# Patient Record
Sex: MALE | Race: WHITE | NOT HISPANIC OR LATINO | Employment: OTHER | URBAN - METROPOLITAN AREA
[De-identification: names, ages, dates, MRNs, and addresses within clinical notes are randomized per-mention and may not be internally consistent; named-entity substitution may affect disease eponyms.]

---

## 2023-03-28 ENCOUNTER — APPOINTMENT (OUTPATIENT)
Dept: PRIMARY CARE | Facility: CLINIC | Age: 88
End: 2023-03-28
Payer: MEDICARE

## 2023-05-09 ENCOUNTER — OFFICE VISIT (OUTPATIENT)
Dept: PRIMARY CARE | Facility: CLINIC | Age: 88
End: 2023-05-09
Payer: MEDICARE

## 2023-05-09 VITALS
DIASTOLIC BLOOD PRESSURE: 76 MMHG | BODY MASS INDEX: 27.62 KG/M2 | SYSTOLIC BLOOD PRESSURE: 124 MMHG | WEIGHT: 215.2 LBS | HEIGHT: 74 IN | TEMPERATURE: 97.7 F | OXYGEN SATURATION: 98 % | HEART RATE: 58 BPM

## 2023-05-09 DIAGNOSIS — H93.13 BILATERAL TINNITUS: ICD-10-CM

## 2023-05-09 DIAGNOSIS — Z79.4 CONTROLLED TYPE 2 DIABETES MELLITUS WITHOUT COMPLICATION, WITH LONG-TERM CURRENT USE OF INSULIN (MULTI): ICD-10-CM

## 2023-05-09 DIAGNOSIS — R42 VERTIGO: ICD-10-CM

## 2023-05-09 DIAGNOSIS — G47.33 OBSTRUCTIVE SLEEP APNEA SYNDROME: ICD-10-CM

## 2023-05-09 DIAGNOSIS — R35.0 BENIGN PROSTATIC HYPERPLASIA WITH URINARY FREQUENCY: ICD-10-CM

## 2023-05-09 DIAGNOSIS — E11.9 CONTROLLED TYPE 2 DIABETES MELLITUS WITHOUT COMPLICATION, WITH LONG-TERM CURRENT USE OF INSULIN (MULTI): ICD-10-CM

## 2023-05-09 DIAGNOSIS — L57.0 AK (ACTINIC KERATOSIS): ICD-10-CM

## 2023-05-09 DIAGNOSIS — R05.1 ACUTE COUGH: Primary | ICD-10-CM

## 2023-05-09 DIAGNOSIS — N40.1 BENIGN PROSTATIC HYPERPLASIA WITH URINARY FREQUENCY: ICD-10-CM

## 2023-05-09 DIAGNOSIS — J20.9 ACUTE BRONCHITIS, UNSPECIFIED ORGANISM: ICD-10-CM

## 2023-05-09 DIAGNOSIS — E78.2 MIXED HYPERLIPIDEMIA: ICD-10-CM

## 2023-05-09 PROCEDURE — 1036F TOBACCO NON-USER: CPT | Performed by: FAMILY MEDICINE

## 2023-05-09 PROCEDURE — 1159F MED LIST DOCD IN RCRD: CPT | Performed by: FAMILY MEDICINE

## 2023-05-09 PROCEDURE — 99204 OFFICE O/P NEW MOD 45 MIN: CPT | Performed by: FAMILY MEDICINE

## 2023-05-09 PROCEDURE — 1160F RVW MEDS BY RX/DR IN RCRD: CPT | Performed by: FAMILY MEDICINE

## 2023-05-09 RX ORDER — TRAZODONE HYDROCHLORIDE 50 MG/1
1 TABLET ORAL NIGHTLY
Qty: 30 TABLET | Refills: 2 | COMMUNITY
Start: 2023-03-06 | End: 2023-08-15 | Stop reason: SDUPTHER

## 2023-05-09 RX ORDER — TAMSULOSIN HYDROCHLORIDE 0.4 MG/1
0.4 CAPSULE ORAL
COMMUNITY
Start: 2023-03-27 | End: 2023-06-26 | Stop reason: SDUPTHER

## 2023-05-09 RX ORDER — ASPIRIN 81 MG/1
81 TABLET ORAL DAILY
COMMUNITY

## 2023-05-09 RX ORDER — AZITHROMYCIN 250 MG/1
TABLET, FILM COATED ORAL
Qty: 6 TABLET | Refills: 0 | Status: SHIPPED | OUTPATIENT
Start: 2023-05-09 | End: 2023-05-14

## 2023-05-09 RX ORDER — ATORVASTATIN CALCIUM 40 MG/1
40 TABLET, FILM COATED ORAL
COMMUNITY
Start: 2022-10-21 | End: 2023-05-09 | Stop reason: SDUPTHER

## 2023-05-09 RX ORDER — ATORVASTATIN CALCIUM 40 MG/1
40 TABLET, FILM COATED ORAL
Qty: 90 TABLET | Refills: 3 | Status: SHIPPED | OUTPATIENT
Start: 2023-05-09 | End: 2024-04-29

## 2023-05-09 ASSESSMENT — ENCOUNTER SYMPTOMS
DYSPHORIC MOOD: 0
BACK PAIN: 0
CONSTIPATION: 0
SORE THROAT: 0
DIFFICULTY URINATING: 0
ADENOPATHY: 0
ABDOMINAL PAIN: 0
NUMBNESS: 0
ACTIVITY CHANGE: 0
CHEST TIGHTNESS: 0
BRUISES/BLEEDS EASILY: 0
MYALGIAS: 0
HEADACHES: 0
DEPRESSION: 0
FATIGUE: 0
EYE DISCHARGE: 0
DIARRHEA: 0
NECK PAIN: 0
ARTHRALGIAS: 0
VOMITING: 0
WEAKNESS: 0
DIZZINESS: 0
LOSS OF SENSATION IN FEET: 0
BLOOD IN STOOL: 0
COUGH: 0
SHORTNESS OF BREATH: 0
NAUSEA: 0
NERVOUS/ANXIOUS: 0
OCCASIONAL FEELINGS OF UNSTEADINESS: 0
HEMATURIA: 0

## 2023-05-09 NOTE — PROGRESS NOTES
Subjective   Patient ID: Chris Russell is a 89 y.o. male who presents to \[facility]\"" Care.    Discuss productive cough for over 1 week, yellow mucus.     Discuss skin issues, saw a dermatologist years ago.     HPI  New patient visit    Here with spouse and daughter    Acute cough and bronchitis symptoms  Declines testing  Declines COVID testing  Would like medicine  Does agree after discussion to chest x-ray    High cholesterol stable  Watch diet and follow blood work    BPH with urinary frequency  On Flomax with some help    Multiple skin lesions  History squamous cell carcinoma  Multiple actinic keratosis changes  Needs dermatology evaluation  Discussed risk of cancer    Vertigo stable with tinnitus  See ENT specialist    Diabetes stable  Update blood work  Tolerating medicine well    Sleep apnea stable  Tolerating CPAP  Review of Systems   Constitutional:  Negative for activity change and fatigue.   HENT:  Negative for congestion and sore throat.    Eyes:  Negative for discharge.   Respiratory:  Negative for cough, chest tightness and shortness of breath.    Cardiovascular:  Negative for chest pain and leg swelling.   Gastrointestinal:  Negative for abdominal pain, blood in stool, constipation, diarrhea, nausea and vomiting.   Endocrine: Negative for cold intolerance and heat intolerance.   Genitourinary:  Negative for difficulty urinating and hematuria.   Musculoskeletal:  Negative for arthralgias, back pain, gait problem, myalgias and neck pain.   Skin:  Positive for rash.   Allergic/Immunologic: Negative for environmental allergies.   Neurological:  Negative for dizziness, syncope, weakness, numbness and headaches.   Hematological:  Negative for adenopathy. Does not bruise/bleed easily.   Psychiatric/Behavioral:  Negative for dysphoric mood. The patient is not nervous/anxious.    All other systems reviewed and are negative.      Objective   /76 (BP Location: Right arm, BP Cuff Size: Large adult)   Pulse  "58   Temp 36.5 °C (97.7 °F)   Ht 1.88 m (6' 2\")   Wt 97.6 kg (215 lb 3.2 oz)   SpO2 98%   BMI 27.63 kg/m²    Physical Exam  Vitals and nursing note reviewed.   Constitutional:       General: He is not in acute distress.     Appearance: Normal appearance.   HENT:      Head: Normocephalic and atraumatic.      Right Ear: Tympanic membrane, ear canal and external ear normal.      Left Ear: Tympanic membrane, ear canal and external ear normal.      Nose: Nose normal.      Mouth/Throat:      Mouth: Mucous membranes are moist.      Pharynx: Oropharynx is clear. No oropharyngeal exudate or posterior oropharyngeal erythema.   Eyes:      Extraocular Movements: Extraocular movements intact.      Conjunctiva/sclera: Conjunctivae normal.      Pupils: Pupils are equal, round, and reactive to light.   Cardiovascular:      Rate and Rhythm: Normal rate and regular rhythm.      Pulses: Normal pulses.      Heart sounds: Normal heart sounds. No murmur heard.  Pulmonary:      Effort: Pulmonary effort is normal. No respiratory distress.      Breath sounds: Normal breath sounds. No wheezing or rales.   Abdominal:      General: Abdomen is flat. Bowel sounds are normal. There is no distension.      Palpations: Abdomen is soft. There is no mass.      Tenderness: There is no abdominal tenderness.   Musculoskeletal:         General: No swelling or deformity. Normal range of motion.      Cervical back: Normal range of motion and neck supple.      Right lower leg: No edema.      Left lower leg: No edema.   Lymphadenopathy:      Cervical: No cervical adenopathy.   Skin:     General: Skin is warm and dry.      Capillary Refill: Capillary refill takes less than 2 seconds.      Findings: No lesion or rash.      Comments: Ak and multiple lesions   Neurological:      General: No focal deficit present.      Mental Status: He is alert and oriented to person, place, and time.      Cranial Nerves: No cranial nerve deficit.      Motor: No weakness. "   Psychiatric:         Mood and Affect: Mood normal.         Behavior: Behavior normal.         Thought Content: Thought content normal.         Judgment: Judgment normal.         Assessment/Plan   Problem List Items Addressed This Visit          Nervous    Obstructive sleep apnea syndrome       Endocrine/Metabolic    Controlled type 2 diabetes mellitus without complication, with long-term current use of insulin (CMS/[facility])    Relevant Orders    Follow Up In [facility] - PCP       Other    Mixed hyperlipidemia    Relevant Medications    atorvastatin (Lipitor) 40 mg tablet    Other Relevant Orders    Follow Up In [facility] - PCP    Benign prostatic hyperplasia with urinary frequency    AK (actinic keratosis)    Relevant Orders    Referral to Dermatology    Vertigo    Relevant Orders    Referral to ENT    Bilateral tinnitus    Relevant Orders    Referral to ENT     Other Visit Diagnoses       Acute cough    -  Primary    Relevant Orders    XR chest 2 views    Acute bronchitis, unspecified organism        Relevant Medications    azithromycin (Zithromax) 250 mg tablet    Other Relevant Orders    XR chest 2 views            Patient education provided.  Stay current with age appropriate health maintenance as instructed.  Appointment here or ER with new or worsening symptoms'  Keep appropriate follow-up visit.  Stay current with proper immunizations   Discussed at length with patient and spouse and daughter  Medicine and testing as above and referrals as above 90-day follow-up visit recommended

## 2023-06-05 DIAGNOSIS — R05.1 ACUTE COUGH: ICD-10-CM

## 2023-06-05 DIAGNOSIS — J20.9 ACUTE BRONCHITIS, UNSPECIFIED ORGANISM: ICD-10-CM

## 2023-06-05 RX ORDER — BENZONATATE 200 MG/1
200 CAPSULE ORAL 3 TIMES DAILY PRN
Qty: 30 CAPSULE | Refills: 0 | Status: SHIPPED | OUTPATIENT
Start: 2023-06-05 | End: 2023-06-15

## 2023-06-05 RX ORDER — CLARITHROMYCIN 500 MG/1
500 TABLET, FILM COATED ORAL 2 TIMES DAILY
Qty: 20 TABLET | Refills: 0 | Status: SHIPPED | OUTPATIENT
Start: 2023-06-05 | End: 2023-06-15

## 2023-06-15 ENCOUNTER — TELEPHONE (OUTPATIENT)
Dept: PRIMARY CARE | Facility: CLINIC | Age: 88
End: 2023-06-15
Payer: MEDICARE

## 2023-06-15 NOTE — TELEPHONE ENCOUNTER
Patient daughter called stating that Father is having trouble breathing for over 2 days. He is currently sick and on his second round antibiotics. Patient daughter was not at the home when she called but she said that patient is nervous. Patient daughter was advised to take father to er since he is having trouble breathing and that a message will be placed in the chart for Nk   OZZY   Please advise

## 2023-06-22 ENCOUNTER — PATIENT OUTREACH (OUTPATIENT)
Dept: PRIMARY CARE | Facility: CLINIC | Age: 88
End: 2023-06-22
Payer: MEDICARE

## 2023-06-22 RX ORDER — APIXABAN 5 MG (74)
2 KIT ORAL EVERY 12 HOURS
COMMUNITY

## 2023-06-22 RX ORDER — BENZOCAINE AND MENTHOL, UNSPECIFIED FORM 15; 2.3 MG/1; MG/1
LOZENGE ORAL
COMMUNITY

## 2023-06-22 RX ORDER — PREDNISONE 20 MG/1
1 TABLET ORAL DAILY
COMMUNITY
Start: 2023-06-21

## 2023-06-22 NOTE — PROGRESS NOTES
Discharge Facility: [facility]  Discharge Diagnosis: covid pneumonia  Admission Date: 6/15/23  Discharge Date: 6/21/23    PCP Appointment Date: 6/29/23  Specialist Appointment Date: N/A  Hospital Encounter and Summary: Linked  See discharge assessment below for further details    Medications  Medications reviewed with patient/caregiver?: Yes (new/changes only) (6/22/2023  2:34 PM)  Is the patient having any side effects they believe may be caused by any medication additions or changes?: No (6/22/2023  2:34 PM)  Does the patient have all medications ordered at discharge?: Yes (6/22/2023  2:34 PM)  Care Management Interventions: No intervention needed (6/22/2023  2:34 PM)  Is the patient taking all medications as directed (includes completed medication regime)?: Yes (6/22/2023  2:34 PM)  Care Management Interventions: Provided patient education (6/22/2023  2:34 PM)  Medication Comments: Start: eliquis, prednisone, cepacol. Discontinue: Biaxin (6/22/2023  2:34 PM)    Appointments  Does the patient have a primary care provider?: Yes (6/22/2023  2:34 PM)  Care Management Interventions: Verified appointment date/time/provider (6/22/2023  2:34 PM)  Care Management Interventions: Advised patient to keep appointment (6/22/2023  2:34 PM)    Self Management  What is the home health agency?: [facility] (6/22/2023  2:34 PM)  Has home health visited the patient within 72 hours of discharge?: No (6/22/2023  2:34 PM)  Home Health Interventions: Called home health agency (referral received and in process) (6/22/2023  2:34 PM)  What Durable Medical Equipment (DME) was ordered?: n/a (6/22/2023  2:34 PM)    Patient Teaching  Does the patient have access to their discharge instructions?: Yes (6/22/2023  2:34 PM)  Care Management Interventions: Reviewed instructions with patient (6/22/2023  2:34 PM)  What is the patient's perception of their health status since discharge?: Improving (6/22/2023  2:34 PM)  Is the patient/caregiver able to teach  back the hierarchy of who to call/visit for symptoms/problems? PCP, Specialist, Home Health nurse, Urgent Care, ED, 911: Yes (6/22/2023  2:34 PM)

## 2023-06-26 DIAGNOSIS — N40.1 BENIGN PROSTATIC HYPERPLASIA WITH URINARY FREQUENCY: ICD-10-CM

## 2023-06-26 DIAGNOSIS — R35.0 BENIGN PROSTATIC HYPERPLASIA WITH URINARY FREQUENCY: ICD-10-CM

## 2023-06-26 DIAGNOSIS — R06.02 SOB (SHORTNESS OF BREATH): ICD-10-CM

## 2023-06-26 RX ORDER — TAMSULOSIN HYDROCHLORIDE 0.4 MG/1
0.4 CAPSULE ORAL DAILY
Qty: 30 CAPSULE | Refills: 0 | Status: SHIPPED | OUTPATIENT
Start: 2023-06-26 | End: 2023-07-19

## 2023-06-26 RX ORDER — ALBUTEROL SULFATE 90 UG/1
2 AEROSOL, METERED RESPIRATORY (INHALATION) EVERY 4 HOURS PRN
Qty: 18 G | Refills: 2 | Status: SHIPPED | OUTPATIENT
Start: 2023-06-26 | End: 2024-02-20 | Stop reason: SDUPTHER

## 2023-06-26 NOTE — TELEPHONE ENCOUNTER
HONG FROM [facility] CALLED STATING SHE STARTED CARE YESTERDAY 6/25 AND THE PATIENT SEEMS TO BE DOING WELL. SHE STATED HIS O2 STATS ARE 92 RESTING ON ROOM AIR. SHE IS REQUESTING A PRESCRIPTION FOR AN INHALER. PATIENT WAS PRESCRIBED A VENTOLIN INHALER WHEN HE WAS DISCHARGED BUT IS IT NOW OUT.     PLEASE SEND TO [facility]   PLEASE ADVISE

## 2023-06-29 ENCOUNTER — OFFICE VISIT (OUTPATIENT)
Dept: PRIMARY CARE | Facility: CLINIC | Age: 88
End: 2023-06-29
Payer: MEDICARE

## 2023-06-29 VITALS
SYSTOLIC BLOOD PRESSURE: 132 MMHG | WEIGHT: 200 LBS | OXYGEN SATURATION: 92 % | TEMPERATURE: 98 F | HEART RATE: 96 BPM | HEIGHT: 74 IN | DIASTOLIC BLOOD PRESSURE: 70 MMHG | BODY MASS INDEX: 25.67 KG/M2

## 2023-06-29 DIAGNOSIS — L57.0 AK (ACTINIC KERATOSIS): ICD-10-CM

## 2023-06-29 DIAGNOSIS — H91.8X3 OTHER SPECIFIED HEARING LOSS OF BOTH EARS: ICD-10-CM

## 2023-06-29 DIAGNOSIS — G47.33 OBSTRUCTIVE SLEEP APNEA SYNDROME: ICD-10-CM

## 2023-06-29 DIAGNOSIS — Z79.4 CONTROLLED TYPE 2 DIABETES MELLITUS WITHOUT COMPLICATION, WITH LONG-TERM CURRENT USE OF INSULIN (MULTI): ICD-10-CM

## 2023-06-29 DIAGNOSIS — N40.1 BENIGN PROSTATIC HYPERPLASIA WITH URINARY FREQUENCY: ICD-10-CM

## 2023-06-29 DIAGNOSIS — R35.0 BENIGN PROSTATIC HYPERPLASIA WITH URINARY FREQUENCY: ICD-10-CM

## 2023-06-29 DIAGNOSIS — U07.1 PNEUMONIA DUE TO COVID-19 VIRUS: Primary | ICD-10-CM

## 2023-06-29 DIAGNOSIS — E11.9 CONTROLLED TYPE 2 DIABETES MELLITUS WITHOUT COMPLICATION, WITH LONG-TERM CURRENT USE OF INSULIN (MULTI): ICD-10-CM

## 2023-06-29 DIAGNOSIS — J12.82 PNEUMONIA DUE TO COVID-19 VIRUS: Primary | ICD-10-CM

## 2023-06-29 DIAGNOSIS — I26.99 ACUTE PULMONARY EMBOLISM WITHOUT ACUTE COR PULMONALE, UNSPECIFIED PULMONARY EMBOLISM TYPE (MULTI): ICD-10-CM

## 2023-06-29 PROBLEM — H91.93 BILATERAL HEARING LOSS: Status: ACTIVE | Noted: 2023-06-29

## 2023-06-29 PROCEDURE — 99495 TRANSJ CARE MGMT MOD F2F 14D: CPT | Performed by: FAMILY MEDICINE

## 2023-06-29 PROCEDURE — 1160F RVW MEDS BY RX/DR IN RCRD: CPT | Performed by: FAMILY MEDICINE

## 2023-06-29 PROCEDURE — 1036F TOBACCO NON-USER: CPT | Performed by: FAMILY MEDICINE

## 2023-06-29 PROCEDURE — 1159F MED LIST DOCD IN RCRD: CPT | Performed by: FAMILY MEDICINE

## 2023-06-29 NOTE — PROGRESS NOTES
"Patient: Chris Russell  : 1930  PCP: Pierce Chambers MD  MRN: [id number]  Program: No linked episodes     Chris Russell is a 89 y.o. male presenting today for follow-up after being discharged from the hospital 8 days ago. The main problem requiring admission was Covid Pneumonia. The discharge summary and/or Transitional Care Management documentation was reviewed. Medication reconciliation was performed as indicated via the \"Ryan as Reviewed\" timestamp.     Chris Russell was contacted by Transitional Care Management services two days after his discharge. This encounter and supporting documentation was reviewed.    The complexity of medical decision making for this patient's transitional care is high.    Discharge Facility: [facility]  Discharge Diagnosis: covid pneumonia  Admission Date: 6/15/23  Discharge Date: 23    PCP Appointment Date: 23  Specialist Appointment Date: N/A  Hospital Encounter and Summary: Linked  See discharge assessment below for further details    Medications  Medications reviewed with patient/caregiver?: Yes (new/changes only) (2023  2:34 PM)  Is the patient having any side effects they believe may be caused by any medication additions or changes?: No (2023  2:34 PM)  Does the patient have all medications ordered at discharge?: Yes (2023  2:34 PM)  Care Management Interventions: No intervention needed (2023  2:34 PM)  Is the patient taking all medications as directed (includes completed medication regime)?: Yes (2023  2:34 PM)  Care Management Interventions: Provided patient education (2023  2:34 PM)  Medication Comments: Start: eliquis, prednisone, cepacol. Discontinue: Biaxin (2023  2:34 PM)    Appointments  Does the patient have a primary care provider?: Yes (2023  2:34 PM)  Care Management Interventions: Verified appointment date/time/provider (2023  2:34 PM)  Care Management Interventions: Advised patient to keep " appointment (6/22/2023  2:34 PM)    Self Management  What is the home health agency?: [facility] (6/22/2023  2:34 PM)  Has home health visited the patient within 72 hours of discharge?: No (6/22/2023  2:34 PM)  Home Health Interventions: Called home health agency (referral received and in process) (6/22/2023  2:34 PM)  What Durable Medical Equipment (DME) was ordered?: n/a (6/22/2023  2:34 PM)    Patient Teaching  Does the patient have access to their discharge instructions?: Yes (6/22/2023  2:34 PM)  Care Management Interventions: Reviewed instructions with patient (6/22/2023  2:34 PM)  What is the patient's perception of their health status since discharge?: Improving (6/22/2023  2:34 PM)  Is the patient/caregiver able to teach back the hierarchy of who to call/visit for symptoms/problems? PCP, Specialist, Home Health nurse, Urgent Care, ED, 911: Yes (6/22/2023  2:34 PM)          HPI  Patient here today with spouse and his son    Recently discharged from the hospital because of COVID-19 pneumonia  Needs transition of care visit  Also general checkup visit  Also had pulmonary embolism  Now on Eliquis    Diabetes stable  Blood sugars reasonable    Hearing loss  Would like evaluation by otolaryngology    Multiple actinic keratosis of the skin  Should have dermatology evaluation    BPH stable  No progressive urinary issues reported    Sleep apnea stable  Suggest use of proper therapy  Review of Systems   Constitutional:  Negative for activity change and fatigue.   HENT:  Negative for congestion and sore throat.    Eyes:  Negative for discharge.   Respiratory:  Negative for cough, chest tightness and shortness of breath.    Cardiovascular:  Negative for chest pain and leg swelling.   Gastrointestinal:  Negative for abdominal pain, blood in stool, constipation, diarrhea, nausea and vomiting.   Endocrine: Negative for cold intolerance and heat intolerance.   Genitourinary:  Negative for difficulty urinating and hematuria.  "  Musculoskeletal:  Negative for arthralgias, back pain, gait problem, myalgias and neck pain.   Allergic/Immunologic: Negative for environmental allergies.   Neurological:  Negative for dizziness, syncope, weakness, numbness and headaches.   Hematological:  Negative for adenopathy. Does not bruise/bleed easily.   Psychiatric/Behavioral:  Negative for dysphoric mood. The patient is not nervous/anxious.    All other systems reviewed and are negative.      Objective   /70 (BP Location: Right arm, BP Cuff Size: Large adult)   Pulse 96   Temp 36.7 °C (98 °F)   Ht 1.88 m (6' 2\")   Wt 90.7 kg (200 lb)   SpO2 92%   BMI 25.68 kg/m²    Physical Exam  Vitals and nursing note reviewed.   Constitutional:       General: He is not in acute distress.     Appearance: Normal appearance.   HENT:      Head: Normocephalic and atraumatic.      Right Ear: Tympanic membrane, ear canal and external ear normal.      Left Ear: Tympanic membrane, ear canal and external ear normal.      Nose: Nose normal.      Mouth/Throat:      Mouth: Mucous membranes are moist.      Pharynx: Oropharynx is clear. No oropharyngeal exudate or posterior oropharyngeal erythema.   Eyes:      Extraocular Movements: Extraocular movements intact.      Conjunctiva/sclera: Conjunctivae normal.      Pupils: Pupils are equal, round, and reactive to light.   Cardiovascular:      Rate and Rhythm: Normal rate and regular rhythm.      Pulses: Normal pulses.      Heart sounds: Normal heart sounds. No murmur heard.  Pulmonary:      Effort: Pulmonary effort is normal. No respiratory distress.      Breath sounds: Normal breath sounds. No wheezing or rales.   Abdominal:      General: Abdomen is flat. Bowel sounds are normal. There is no distension.      Palpations: Abdomen is soft. There is no mass.      Tenderness: There is no abdominal tenderness.   Musculoskeletal:         General: No swelling or deformity. Normal range of motion.      Cervical back: Normal range " of motion and neck supple.      Right lower leg: No edema.      Left lower leg: No edema.   Lymphadenopathy:      Cervical: No cervical adenopathy.   Skin:     General: Skin is warm and dry.      Capillary Refill: Capillary refill takes less than 2 seconds.      Findings: No lesion or rash.   Neurological:      General: No focal deficit present.      Mental Status: He is alert and oriented to person, place, and time.      Cranial Nerves: No cranial nerve deficit.      Motor: No weakness.   Psychiatric:         Mood and Affect: Mood normal.         Behavior: Behavior normal.         Thought Content: Thought content normal.         Judgment: Judgment normal.     Skin with positive actinic keratoses    Assessment/Plan   Problem List Items Addressed This Visit       Benign prostatic hyperplasia with urinary frequency    AK (actinic keratosis)    Relevant Orders    Referral to Dermatology    Controlled type 2 diabetes mellitus without complication, with long-term current use of insulin (CMS/[facility])    Obstructive sleep apnea syndrome    Bilateral hearing loss    Relevant Orders    Referral to ENT    Acute pulmonary embolism without acute cor pulmonale (CMS/[facility])    Relevant Medications    apixaban (Eliquis) 5 mg tablet     Other Visit Diagnoses       Pneumonia due to COVID-19 virus    -  Primary    Relevant Orders    Follow Up In [facility] - PCP    XR chest 2 views            Patient education provided.  Stay current with age appropriate health maintenance as instructed.  Appointment here or ER with new or worsening symptoms'  Keep appropriate follow-up visit.  Stay current with proper immunizations   Follow-up chest x-ray  Keep follow-up with ENT and dermatology  Suggest pulmonary evaluation as well  This has been ordered  1 month follow-up visit return sooner with new or worsening symptoms  Discussed at length with patient, spouse, son  They understand and agree with this course of treatment

## 2023-06-30 ASSESSMENT — ENCOUNTER SYMPTOMS
DIZZINESS: 0
ADENOPATHY: 0
NERVOUS/ANXIOUS: 0
BLOOD IN STOOL: 0
VOMITING: 0
DIFFICULTY URINATING: 0
HEMATURIA: 0
CONSTIPATION: 0
FATIGUE: 0
ARTHRALGIAS: 0
SHORTNESS OF BREATH: 0
HEADACHES: 0
BRUISES/BLEEDS EASILY: 0
DYSPHORIC MOOD: 0
WEAKNESS: 0
COUGH: 0
BACK PAIN: 0
DIARRHEA: 0
NAUSEA: 0
MYALGIAS: 0
ACTIVITY CHANGE: 0
ABDOMINAL PAIN: 0
NUMBNESS: 0
NECK PAIN: 0
EYE DISCHARGE: 0
CHEST TIGHTNESS: 0
SORE THROAT: 0

## 2023-07-07 ENCOUNTER — PATIENT OUTREACH (OUTPATIENT)
Dept: PRIMARY CARE | Facility: CLINIC | Age: 88
End: 2023-07-07
Payer: MEDICARE

## 2023-07-07 NOTE — PROGRESS NOTES
Call regarding appt. with PCP on 6/29/23 after hospitalization.  At time of outreach call the patient feels as if their condition has improved since last visit.   Reviewed the PCP appointment with the pt and addressed any questions or concerns.Per patient he is still coughing but is drinking a lot of water. Does not feel like his cough is worse. Does cough up phlegm at times.   Encouraged patient to ensure he is practicing deep breathing. Patient aware to contact provider or return to ED for new/worsening symptoms.

## 2023-07-10 ENCOUNTER — TELEPHONE (OUTPATIENT)
Dept: PRIMARY CARE | Facility: CLINIC | Age: 88
End: 2023-07-10
Payer: MEDICARE

## 2023-07-10 NOTE — TELEPHONE ENCOUNTER
Patient and wife aware of test results, wanting to know what to do from here.     Would like to know if here is any treatments or doctors he should see.

## 2023-07-19 DIAGNOSIS — N40.1 BENIGN PROSTATIC HYPERPLASIA WITH URINARY FREQUENCY: ICD-10-CM

## 2023-07-19 DIAGNOSIS — R35.0 BENIGN PROSTATIC HYPERPLASIA WITH URINARY FREQUENCY: ICD-10-CM

## 2023-07-19 RX ORDER — TAMSULOSIN HYDROCHLORIDE 0.4 MG/1
CAPSULE ORAL
Qty: 30 CAPSULE | Refills: 0 | Status: SHIPPED | OUTPATIENT
Start: 2023-07-19 | End: 2023-08-14

## 2023-08-11 DIAGNOSIS — N40.1 BENIGN PROSTATIC HYPERPLASIA WITH URINARY FREQUENCY: ICD-10-CM

## 2023-08-11 DIAGNOSIS — R35.0 BENIGN PROSTATIC HYPERPLASIA WITH URINARY FREQUENCY: ICD-10-CM

## 2023-08-14 RX ORDER — TAMSULOSIN HYDROCHLORIDE 0.4 MG/1
CAPSULE ORAL
Qty: 30 CAPSULE | Refills: 0 | Status: SHIPPED | OUTPATIENT
Start: 2023-08-14 | End: 2023-09-05

## 2023-08-15 ENCOUNTER — OFFICE VISIT (OUTPATIENT)
Dept: PRIMARY CARE | Facility: CLINIC | Age: 88
End: 2023-08-15
Payer: MEDICARE

## 2023-08-15 VITALS
DIASTOLIC BLOOD PRESSURE: 72 MMHG | TEMPERATURE: 98.2 F | HEIGHT: 74 IN | SYSTOLIC BLOOD PRESSURE: 110 MMHG | WEIGHT: 212 LBS | OXYGEN SATURATION: 91 % | BODY MASS INDEX: 27.21 KG/M2 | HEART RATE: 87 BPM

## 2023-08-15 DIAGNOSIS — N40.1 BENIGN PROSTATIC HYPERPLASIA WITH URINARY FREQUENCY: ICD-10-CM

## 2023-08-15 DIAGNOSIS — F51.01 PRIMARY INSOMNIA: ICD-10-CM

## 2023-08-15 DIAGNOSIS — L57.0 AK (ACTINIC KERATOSIS): ICD-10-CM

## 2023-08-15 DIAGNOSIS — R35.0 BENIGN PROSTATIC HYPERPLASIA WITH URINARY FREQUENCY: ICD-10-CM

## 2023-08-15 DIAGNOSIS — Z00.00 ROUTINE GENERAL MEDICAL EXAMINATION AT HEALTH CARE FACILITY: Primary | ICD-10-CM

## 2023-08-15 DIAGNOSIS — E11.9 CONTROLLED TYPE 2 DIABETES MELLITUS WITHOUT COMPLICATION, WITH LONG-TERM CURRENT USE OF INSULIN (MULTI): ICD-10-CM

## 2023-08-15 DIAGNOSIS — G47.33 OBSTRUCTIVE SLEEP APNEA SYNDROME: ICD-10-CM

## 2023-08-15 DIAGNOSIS — E78.2 MIXED HYPERLIPIDEMIA: ICD-10-CM

## 2023-08-15 DIAGNOSIS — I26.99 ACUTE PULMONARY EMBOLISM WITHOUT ACUTE COR PULMONALE, UNSPECIFIED PULMONARY EMBOLISM TYPE (MULTI): ICD-10-CM

## 2023-08-15 DIAGNOSIS — Z79.4 CONTROLLED TYPE 2 DIABETES MELLITUS WITHOUT COMPLICATION, WITH LONG-TERM CURRENT USE OF INSULIN (MULTI): ICD-10-CM

## 2023-08-15 PROCEDURE — 1159F MED LIST DOCD IN RCRD: CPT | Performed by: FAMILY MEDICINE

## 2023-08-15 PROCEDURE — G0439 PPPS, SUBSEQ VISIT: HCPCS | Performed by: FAMILY MEDICINE

## 2023-08-15 PROCEDURE — 1036F TOBACCO NON-USER: CPT | Performed by: FAMILY MEDICINE

## 2023-08-15 PROCEDURE — 1160F RVW MEDS BY RX/DR IN RCRD: CPT | Performed by: FAMILY MEDICINE

## 2023-08-15 PROCEDURE — 99214 OFFICE O/P EST MOD 30 MIN: CPT | Performed by: FAMILY MEDICINE

## 2023-08-15 PROCEDURE — 1170F FXNL STATUS ASSESSED: CPT | Performed by: FAMILY MEDICINE

## 2023-08-15 RX ORDER — TRAZODONE HYDROCHLORIDE 50 MG/1
50 TABLET ORAL NIGHTLY
Qty: 90 TABLET | Refills: 3 | Status: SHIPPED | OUTPATIENT
Start: 2023-08-15 | End: 2024-02-20 | Stop reason: SDUPTHER

## 2023-08-15 ASSESSMENT — PATIENT HEALTH QUESTIONNAIRE - PHQ9
2. FEELING DOWN, DEPRESSED OR HOPELESS: NOT AT ALL
SUM OF ALL RESPONSES TO PHQ9 QUESTIONS 1 AND 2: 0
1. LITTLE INTEREST OR PLEASURE IN DOING THINGS: NOT AT ALL

## 2023-08-15 ASSESSMENT — ENCOUNTER SYMPTOMS
DEPRESSION: 0
DIARRHEA: 0
NERVOUS/ANXIOUS: 0
ACTIVITY CHANGE: 0
BRUISES/BLEEDS EASILY: 0
VOMITING: 0
DIFFICULTY URINATING: 0
DIZZINESS: 0
SHORTNESS OF BREATH: 0
WEAKNESS: 0
SORE THROAT: 0
BACK PAIN: 0
BLOOD IN STOOL: 0
CONSTIPATION: 0
FATIGUE: 0
DYSPHORIC MOOD: 0
COUGH: 1
CHEST TIGHTNESS: 0
ARTHRALGIAS: 0
HEMATURIA: 0
NAUSEA: 0
ABDOMINAL PAIN: 0
HEADACHES: 0
NECK PAIN: 0
ADENOPATHY: 0
EYE DISCHARGE: 0
MYALGIAS: 0
LOSS OF SENSATION IN FEET: 0
NUMBNESS: 0
OCCASIONAL FEELINGS OF UNSTEADINESS: 0

## 2023-08-15 ASSESSMENT — ACTIVITIES OF DAILY LIVING (ADL)
GROCERY_SHOPPING: NEEDS ASSISTANCE
DOING_HOUSEWORK: NEEDS ASSISTANCE
DRESSING: NEEDS ASSISTANCE
MANAGING_FINANCES: NEEDS ASSISTANCE
TAKING_MEDICATION: NEEDS ASSISTANCE
BATHING: NEEDS ASSISTANCE

## 2023-08-15 NOTE — PROGRESS NOTES
Subjective   Reason for Visit: Chris Russell is an 89 y.o. male here for a Medicare Wellness visit.     Past Medical, Surgical, and Family History reviewed and updated in chart.         HPI  Patient here for annual Medicare wellness visit    Also general checkup visit    Patient here with his spouse and daughter    Insomnia stable  Medicines helpful    Status post PE  Recommend proper treatment to prevent recurrent PE    Positive actinic keratosis seen by dermatology    Sleep apnea stable recommend treatment    Diabetes stable  Health maintenance and continue medicine    BPH stable  No progression or worsening    High cholesterol stable  Watch diet and follow blood work  Patient Care Team:  Pierce Chambers MD as PCP - General (Family Medicine)  Alba Orta LPN as Care Manager (Case Management)     Review of Systems   Constitutional:  Negative for activity change and fatigue.   HENT:  Negative for congestion and sore throat.    Eyes:  Negative for discharge.   Respiratory:  Positive for cough. Negative for chest tightness and shortness of breath.    Cardiovascular:  Negative for chest pain and leg swelling.   Gastrointestinal:  Negative for abdominal pain, blood in stool, constipation, diarrhea, nausea and vomiting.   Endocrine: Negative for cold intolerance and heat intolerance.   Genitourinary:  Negative for difficulty urinating and hematuria.   Musculoskeletal:  Negative for arthralgias, back pain, gait problem, myalgias and neck pain.   Allergic/Immunologic: Negative for environmental allergies.   Neurological:  Negative for dizziness, syncope, weakness, numbness and headaches.   Hematological:  Negative for adenopathy. Does not bruise/bleed easily.   Psychiatric/Behavioral:  Negative for dysphoric mood. The patient is not nervous/anxious.    All other systems reviewed and are negative.      Objective   Vitals:  /72 (BP Location: Left arm, BP Cuff Size: Large adult)   Pulse 87   Temp 36.8 °C  "(98.2 °F)   Ht 1.88 m (6' 2\")   Wt 96.2 kg (212 lb)   SpO2 91%   BMI 27.22 kg/m²       Physical Exam  Vitals and nursing note reviewed.   Constitutional:       General: He is not in acute distress.     Appearance: Normal appearance.   HENT:      Head: Normocephalic and atraumatic.      Right Ear: Tympanic membrane, ear canal and external ear normal.      Left Ear: Tympanic membrane, ear canal and external ear normal.      Nose: Nose normal.      Mouth/Throat:      Mouth: Mucous membranes are moist.      Pharynx: Oropharynx is clear. No oropharyngeal exudate or posterior oropharyngeal erythema.   Eyes:      Extraocular Movements: Extraocular movements intact.      Conjunctiva/sclera: Conjunctivae normal.      Pupils: Pupils are equal, round, and reactive to light.   Cardiovascular:      Rate and Rhythm: Normal rate and regular rhythm.      Pulses: Normal pulses.      Heart sounds: Normal heart sounds. No murmur heard.  Pulmonary:      Effort: Pulmonary effort is normal. No respiratory distress.      Breath sounds: Normal breath sounds. No wheezing or rales.   Abdominal:      General: Abdomen is flat. Bowel sounds are normal. There is no distension.      Palpations: Abdomen is soft. There is no mass.      Tenderness: There is no abdominal tenderness.   Musculoskeletal:         General: No swelling or deformity. Normal range of motion.      Cervical back: Normal range of motion and neck supple.      Right lower leg: No edema.      Left lower leg: No edema.   Lymphadenopathy:      Cervical: No cervical adenopathy.   Skin:     General: Skin is warm and dry.      Capillary Refill: Capillary refill takes less than 2 seconds.      Findings: No lesion or rash.   Neurological:      General: No focal deficit present.      Mental Status: He is alert and oriented to person, place, and time.      Cranial Nerves: No cranial nerve deficit.      Motor: No weakness.   Psychiatric:         Mood and Affect: Mood normal.         " Behavior: Behavior normal.         Thought Content: Thought content normal.         Judgment: Judgment normal.     Positive actinic keratosis of the head and neck region    Assessment/Plan   Problem List Items Addressed This Visit       Mixed hyperlipidemia    Benign prostatic hyperplasia with urinary frequency    AK (actinic keratosis)    Controlled type 2 diabetes mellitus without complication, with long-term current use of insulin (CMS/HCC)    Obstructive sleep apnea syndrome    Acute pulmonary embolism without acute cor pulmonale (CMS/[facility])    Relevant Orders    Follow Up In [facility] - PCP     Other Visit Diagnoses       Routine general medical examination at health care facility    -  Primary    Primary insomnia        Relevant Medications    traZODone (Desyrel) 50 mg tablet        Patient education provided.  Stay current with age appropriate health maintenance as instructed.  Appointment here or ER with new or worsening symptoms'  Keep appropriate follow-up visit.  Stay current with proper immunizations  Recheck 3 months and as needed  Discussed at length with patient and with spouse and with daughter  Stay current with specialist evaluation and keep current medicine

## 2023-08-15 NOTE — PROGRESS NOTES
"Subjective   Reason for Visit: Chris Russell is an 89 y.o. male here for a Medicare Wellness visit.     Still coughing after having Covid.          Reviewed all medications by prescribing practitioner or clinical pharmacist (such as prescriptions, OTCs, herbal therapies and supplements) and documented in the medical record.    HPI    Patient Care Team:  Pierce Chambers MD as PCP - General (Family Medicine)  Alba Orta LPN as Care Manager (Case Management)     Review of Systems    Objective   Vitals:  /72 (BP Location: Left arm, BP Cuff Size: Large adult)   Pulse 87   Temp 36.8 °C (98.2 °F)   Ht 1.88 m (6' 2\")   Wt 96.2 kg (212 lb)   SpO2 91%   BMI 27.22 kg/m²       Physical Exam    Assessment/Plan   Problem List Items Addressed This Visit       Mixed hyperlipidemia                Patient education provided.  Stay current with age appropriate health maintenance as instructed.  Appointment here or ER with new or worsening symptoms'  Keep appropriate follow-up visit.  Stay current with proper immunizations     "

## 2023-08-29 ENCOUNTER — PATIENT OUTREACH (OUTPATIENT)
Dept: PRIMARY CARE | Facility: CLINIC | Age: 88
End: 2023-08-29
Payer: MEDICARE

## 2023-08-29 NOTE — PROGRESS NOTES
Call regarding 2 month discharge follow up.  At time of outreach call the patient feels as if their condition has improved since last visit.  Reviewed the PCP appointment with the pt and addressed any questions or concerns.

## 2023-09-05 DIAGNOSIS — N40.1 BENIGN PROSTATIC HYPERPLASIA WITH URINARY FREQUENCY: ICD-10-CM

## 2023-09-05 DIAGNOSIS — R35.0 BENIGN PROSTATIC HYPERPLASIA WITH URINARY FREQUENCY: ICD-10-CM

## 2023-09-05 RX ORDER — TAMSULOSIN HYDROCHLORIDE 0.4 MG/1
0.4 CAPSULE ORAL DAILY
Qty: 30 CAPSULE | Refills: 0 | Status: SHIPPED | OUTPATIENT
Start: 2023-09-05 | End: 2023-10-03

## 2023-09-26 ENCOUNTER — APPOINTMENT (OUTPATIENT)
Dept: PRIMARY CARE | Facility: CLINIC | Age: 88
End: 2023-09-26
Payer: MEDICARE

## 2023-10-03 DIAGNOSIS — N40.1 BENIGN PROSTATIC HYPERPLASIA WITH URINARY FREQUENCY: Primary | ICD-10-CM

## 2023-10-03 DIAGNOSIS — R35.0 BENIGN PROSTATIC HYPERPLASIA WITH URINARY FREQUENCY: Primary | ICD-10-CM

## 2023-10-03 RX ORDER — TAMSULOSIN HYDROCHLORIDE 0.4 MG/1
0.4 CAPSULE ORAL DAILY
Qty: 30 CAPSULE | Refills: 0 | Status: SHIPPED | OUTPATIENT
Start: 2023-10-03 | End: 2023-10-31

## 2023-10-03 NOTE — TELEPHONE ENCOUNTER
Rx Refill Request     Name: Chris Russell  :  1930   Medication Name:  Tamsulosin (Flomax) 20 mg Tablet  Specific Pharmacy location:  [facility]   Date of last appointment:  8/15/2023   Date of next appointment:  2023   Best number to reach patient:  [phone]

## 2023-10-31 DIAGNOSIS — N40.1 BENIGN PROSTATIC HYPERPLASIA WITH URINARY FREQUENCY: Primary | ICD-10-CM

## 2023-10-31 DIAGNOSIS — R35.0 BENIGN PROSTATIC HYPERPLASIA WITH URINARY FREQUENCY: Primary | ICD-10-CM

## 2023-10-31 RX ORDER — TAMSULOSIN HYDROCHLORIDE 0.4 MG/1
0.4 CAPSULE ORAL DAILY
Qty: 90 CAPSULE | Refills: 1 | Status: SHIPPED | OUTPATIENT
Start: 2023-10-31 | End: 2023-11-21 | Stop reason: DRUGHIGH

## 2023-10-31 NOTE — TELEPHONE ENCOUNTER
Rx Refill Request     Name: Chris Russell  :  1930   Medication Name:  Tamsulosin (Flomax) 0.4 mg 24 hr Tablet  Specific Pharmacy location:  [facility]   Date of last appointment:  8/15/2023   Date of next appointment:  2023   Best number to reach patient:  [phone]         RX PENDED to [facility] as directed

## 2023-11-13 DIAGNOSIS — I26.99 ACUTE PULMONARY EMBOLISM WITHOUT ACUTE COR PULMONALE, UNSPECIFIED PULMONARY EMBOLISM TYPE (MULTI): Primary | ICD-10-CM

## 2023-11-13 RX ORDER — APIXABAN 5 MG/1
5 TABLET, FILM COATED ORAL 2 TIMES DAILY
Qty: 60 TABLET | Refills: 3 | Status: SHIPPED | OUTPATIENT
Start: 2023-11-13 | End: 2024-04-15

## 2023-11-15 ENCOUNTER — TELEPHONE (OUTPATIENT)
Dept: PRIMARY CARE | Facility: CLINIC | Age: 88
End: 2023-11-15

## 2023-11-15 NOTE — TELEPHONE ENCOUNTER
Ayala called wanting t confirm her dad tony doesn't need blood work before his three month follow up because there aren't orders in the system.

## 2023-11-20 NOTE — PROGRESS NOTES
"Subjective   Patient ID: Chris Russell is a 89 y.o. male who presents for Hyperlipidemia and Diabetes.  HPI  Status post pulmonary embolism  No cough or wheeze or shortness of breath  Still on anticoagulation  Discussed treatment options with patient and daughter  Discussed pulmonary or hematology evaluation to discuss use of continued anticoagulation    High cholesterol stable watch diet follow blood work    Diabetes stable  Watch diet follow blood work    BPH ongoing  Patient would like to adjust medicine to have slightly less frequent  urination   Patient declines more testing at this time    Sleep apnea stable  Continue treatment    Stress at home spouse has been ill and hospitalized  Review of Systems   Constitutional:  Negative for activity change and fatigue.   HENT:  Negative for congestion and sore throat.    Eyes:  Negative for discharge.   Respiratory:  Negative for cough, chest tightness and shortness of breath.    Cardiovascular:  Negative for chest pain and leg swelling.   Gastrointestinal:  Negative for abdominal pain, blood in stool, constipation, diarrhea, nausea and vomiting.   Endocrine: Negative for cold intolerance and heat intolerance.   Genitourinary:  Negative for difficulty urinating and hematuria.   Musculoskeletal:  Negative for arthralgias, back pain, gait problem, myalgias and neck pain.   Allergic/Immunologic: Negative for environmental allergies.   Neurological:  Negative for dizziness, syncope, weakness, numbness and headaches.   Hematological:  Negative for adenopathy. Does not bruise/bleed easily.   Psychiatric/Behavioral:  Negative for dysphoric mood. The patient is not nervous/anxious.    All other systems reviewed and are negative.      Objective   /66 (BP Location: Right arm, BP Cuff Size: Large adult)   Pulse 82   Ht 1.88 m (6' 2\")   Wt 98.9 kg (218 lb)   SpO2 97%   BMI 27.99 kg/m²    Physical Exam  Vitals and nursing note reviewed.   Constitutional:       General: " He is not in acute distress.     Appearance: Normal appearance.   HENT:      Head: Normocephalic and atraumatic.      Right Ear: Tympanic membrane, ear canal and external ear normal.      Left Ear: Tympanic membrane, ear canal and external ear normal.      Nose: Nose normal.      Mouth/Throat:      Mouth: Mucous membranes are moist.      Pharynx: Oropharynx is clear. No oropharyngeal exudate or posterior oropharyngeal erythema.   Eyes:      Extraocular Movements: Extraocular movements intact.      Conjunctiva/sclera: Conjunctivae normal.      Pupils: Pupils are equal, round, and reactive to light.   Cardiovascular:      Rate and Rhythm: Normal rate and regular rhythm.      Pulses: Normal pulses.      Heart sounds: Normal heart sounds. No murmur heard.  Pulmonary:      Effort: Pulmonary effort is normal. No respiratory distress.      Breath sounds: Normal breath sounds. No wheezing or rales.   Abdominal:      General: Abdomen is flat. Bowel sounds are normal. There is no distension.      Palpations: Abdomen is soft. There is no mass.      Tenderness: There is no abdominal tenderness.   Musculoskeletal:         General: No swelling or deformity. Normal range of motion.      Cervical back: Normal range of motion and neck supple.      Right lower leg: No edema.      Left lower leg: No edema.   Lymphadenopathy:      Cervical: No cervical adenopathy.   Skin:     General: Skin is warm and dry.      Capillary Refill: Capillary refill takes less than 2 seconds.      Findings: No lesion or rash.   Neurological:      General: No focal deficit present.      Mental Status: He is alert and oriented to person, place, and time.      Cranial Nerves: No cranial nerve deficit.      Motor: No weakness.   Psychiatric:         Mood and Affect: Mood normal.         Behavior: Behavior normal.         Thought Content: Thought content normal.         Judgment: Judgment normal.         Assessment/Plan   Problem List Items Addressed This Visit        Mixed hyperlipidemia    Benign prostatic hyperplasia with urinary frequency    Relevant Medications    tamsulosin (Flomax) 0.4 mg 24 hr capsule    Controlled type 2 diabetes mellitus without complication, with long-term current use of insulin (CMS/[facility])    Relevant Orders    Follow Up In [facility] - PCP    Obstructive sleep apnea syndrome    Acute pulmonary embolism without acute cor pulmonale (CMS/[facility]) - Primary       Patient education provided.  Stay current with age appropriate health maintenance as instructed.  Appointment here or ER with new or worsening symptoms'  Keep appropriate follow-up visit.  Stay current with proper immunizations   3-month recheck  Discussed at length with patient and daughter

## 2023-11-21 ENCOUNTER — OFFICE VISIT (OUTPATIENT)
Dept: PRIMARY CARE | Facility: CLINIC | Age: 88
End: 2023-11-21
Payer: MEDICARE

## 2023-11-21 VITALS
HEIGHT: 74 IN | BODY MASS INDEX: 27.98 KG/M2 | OXYGEN SATURATION: 97 % | HEART RATE: 82 BPM | SYSTOLIC BLOOD PRESSURE: 128 MMHG | WEIGHT: 218 LBS | DIASTOLIC BLOOD PRESSURE: 66 MMHG

## 2023-11-21 DIAGNOSIS — Z79.4 CONTROLLED TYPE 2 DIABETES MELLITUS WITHOUT COMPLICATION, WITH LONG-TERM CURRENT USE OF INSULIN (MULTI): ICD-10-CM

## 2023-11-21 DIAGNOSIS — G47.33 OBSTRUCTIVE SLEEP APNEA SYNDROME: ICD-10-CM

## 2023-11-21 DIAGNOSIS — E78.2 MIXED HYPERLIPIDEMIA: ICD-10-CM

## 2023-11-21 DIAGNOSIS — I26.99 ACUTE PULMONARY EMBOLISM WITHOUT ACUTE COR PULMONALE, UNSPECIFIED PULMONARY EMBOLISM TYPE (MULTI): Primary | ICD-10-CM

## 2023-11-21 DIAGNOSIS — R35.0 BENIGN PROSTATIC HYPERPLASIA WITH URINARY FREQUENCY: ICD-10-CM

## 2023-11-21 DIAGNOSIS — E11.9 CONTROLLED TYPE 2 DIABETES MELLITUS WITHOUT COMPLICATION, WITH LONG-TERM CURRENT USE OF INSULIN (MULTI): ICD-10-CM

## 2023-11-21 DIAGNOSIS — N40.1 BENIGN PROSTATIC HYPERPLASIA WITH URINARY FREQUENCY: ICD-10-CM

## 2023-11-21 PROCEDURE — 1036F TOBACCO NON-USER: CPT | Performed by: FAMILY MEDICINE

## 2023-11-21 PROCEDURE — 1160F RVW MEDS BY RX/DR IN RCRD: CPT | Performed by: FAMILY MEDICINE

## 2023-11-21 PROCEDURE — 99214 OFFICE O/P EST MOD 30 MIN: CPT | Performed by: FAMILY MEDICINE

## 2023-11-21 PROCEDURE — 1159F MED LIST DOCD IN RCRD: CPT | Performed by: FAMILY MEDICINE

## 2023-11-21 RX ORDER — TAMSULOSIN HYDROCHLORIDE 0.4 MG/1
0.8 CAPSULE ORAL DAILY
Qty: 90 CAPSULE | Refills: 1 | Status: SHIPPED | OUTPATIENT
Start: 2023-11-21 | End: 2023-12-08 | Stop reason: SDUPTHER

## 2023-11-21 ASSESSMENT — ENCOUNTER SYMPTOMS
MYALGIAS: 0
BACK PAIN: 0
ARTHRALGIAS: 0
NUMBNESS: 0
DIFFICULTY URINATING: 0
BLOOD IN STOOL: 0
CHEST TIGHTNESS: 0
BRUISES/BLEEDS EASILY: 0
ADENOPATHY: 0
NERVOUS/ANXIOUS: 0
WEAKNESS: 0
VOMITING: 0
SORE THROAT: 0
FATIGUE: 0
NAUSEA: 0
DYSPHORIC MOOD: 0
DIARRHEA: 0
DIZZINESS: 0
ACTIVITY CHANGE: 0
NECK PAIN: 0
HEADACHES: 0
COUGH: 0
EYE DISCHARGE: 0
ABDOMINAL PAIN: 0
SHORTNESS OF BREATH: 0
HEMATURIA: 0
CONSTIPATION: 0

## 2023-12-06 DIAGNOSIS — R35.0 BENIGN PROSTATIC HYPERPLASIA WITH URINARY FREQUENCY: ICD-10-CM

## 2023-12-06 DIAGNOSIS — N40.1 BENIGN PROSTATIC HYPERPLASIA WITH URINARY FREQUENCY: ICD-10-CM

## 2023-12-06 NOTE — TELEPHONE ENCOUNTER
Ayala called because [facility] is refusing to fill billys medication. They said if they fill it the way its written that he will run out of medication before the 90 days so they need dr. Chambers to fix the script and resend it. Please advise

## 2023-12-08 RX ORDER — TAMSULOSIN HYDROCHLORIDE 0.4 MG/1
0.8 CAPSULE ORAL DAILY
Qty: 180 CAPSULE | Refills: 1 | Status: SHIPPED | OUTPATIENT
Start: 2023-12-08 | End: 2024-02-20 | Stop reason: DRUGHIGH

## 2023-12-08 NOTE — TELEPHONE ENCOUNTER
Called pharmacy to see if the order was updated they stated that they did not receive the new order for the medication     Tamsulosin 0.4mg   To be taken 2 capsules by mouth daily    Updated order needs to be sent to pharmacy   Please advise

## 2024-02-19 NOTE — PROGRESS NOTES
"Subjective   Patient ID: Chris Russell is a 89 y.o. male who presents for Hyperlipidemia and Diabetes.  HPI    Wife on hospice at home  Extra stress for patient and her his daughter who is here today as well    Diabetes stable  Watch diet follow blood work    Insomnia stable  Worse with the stress    Shortness of breath no progression or worsening declines further cardiac or pulmonary evaluation    BPH stable    Obstructive sleep apnea stable no progression or worsening    High cholesterol stable watch diet follow blood work    Hearing loss with tinnitus consider further ENT evaluation he has had this in the past        Review of Systems   Constitutional:  Negative for activity change and fatigue.   HENT:  Negative for congestion and sore throat.    Eyes:  Negative for discharge.   Respiratory:  Negative for cough, chest tightness and shortness of breath.    Cardiovascular:  Negative for chest pain and leg swelling.   Gastrointestinal:  Negative for abdominal pain, blood in stool, constipation, diarrhea, nausea and vomiting.   Endocrine: Negative for cold intolerance and heat intolerance.   Genitourinary:  Negative for difficulty urinating and hematuria.   Musculoskeletal:  Negative for arthralgias, back pain, gait problem, myalgias and neck pain.   Allergic/Immunologic: Negative for environmental allergies.   Neurological:  Negative for dizziness, syncope, weakness, numbness and headaches.   Hematological:  Negative for adenopathy. Does not bruise/bleed easily.   Psychiatric/Behavioral:  Negative for dysphoric mood. The patient is not nervous/anxious.    All other systems reviewed and are negative.      Objective   /80 (BP Location: Right arm, BP Cuff Size: Large adult)   Pulse 81   Temp 36.8 °C (98.2 °F) (Temporal)   Ht 1.88 m (6' 2\")   Wt 97.1 kg (214 lb)   SpO2 93%   BMI 27.48 kg/m²    Physical Exam  Vitals and nursing note reviewed.   Constitutional:       General: He is not in acute distress.     " Appearance: Normal appearance.   HENT:      Head: Normocephalic and atraumatic.      Right Ear: Tympanic membrane, ear canal and external ear normal.      Left Ear: Tympanic membrane, ear canal and external ear normal.      Nose: Nose normal.      Mouth/Throat:      Mouth: Mucous membranes are moist.      Pharynx: Oropharynx is clear. No oropharyngeal exudate or posterior oropharyngeal erythema.   Eyes:      Extraocular Movements: Extraocular movements intact.      Conjunctiva/sclera: Conjunctivae normal.      Pupils: Pupils are equal, round, and reactive to light.   Cardiovascular:      Rate and Rhythm: Normal rate and regular rhythm.      Pulses: Normal pulses.      Heart sounds: Normal heart sounds. No murmur heard.  Pulmonary:      Effort: Pulmonary effort is normal. No respiratory distress.      Breath sounds: Normal breath sounds. No wheezing or rales.   Abdominal:      General: Abdomen is flat. Bowel sounds are normal. There is no distension.      Palpations: Abdomen is soft. There is no mass.      Tenderness: There is no abdominal tenderness.   Musculoskeletal:         General: No swelling or deformity. Normal range of motion.      Cervical back: Normal range of motion and neck supple.      Right lower leg: No edema.      Left lower leg: No edema.   Lymphadenopathy:      Cervical: No cervical adenopathy.   Skin:     General: Skin is warm and dry.      Capillary Refill: Capillary refill takes less than 2 seconds.      Findings: No lesion or rash.   Neurological:      General: No focal deficit present.      Mental Status: He is alert and oriented to person, place, and time.      Cranial Nerves: No cranial nerve deficit.      Motor: No weakness.   Psychiatric:         Mood and Affect: Mood normal.         Behavior: Behavior normal.         Thought Content: Thought content normal.         Judgment: Judgment normal.         Assessment/Plan   Problem List Items Addressed This Visit       Mixed hyperlipidemia     Benign prostatic hyperplasia with urinary frequency    Relevant Medications    tamsulosin (Flomax) 0.4 mg 24 hr capsule    Controlled type 2 diabetes mellitus without complication, with long-term current use of insulin (CMS/[facility]) - Primary    Relevant Orders    Hemoglobin A1C    Obstructive sleep apnea syndrome    Bilateral hearing loss     Other Visit Diagnoses       Primary insomnia        Relevant Medications    traZODone (Desyrel) 50 mg tablet    SOB (shortness of breath)        Relevant Medications    albuterol (Ventolin HFA) 90 mcg/actuation inhaler            Patient education provided.  Stay current with age appropriate health maintenance as instructed.  Appointment here or ER with new or worsening symptoms'  Keep appropriate follow-up visit.  Stay current with proper immunizations   Refills as above  Check hemoglobin A1c  Recheck 3 months and as needed  Discussed at length with patient and daughter

## 2024-02-20 ENCOUNTER — OFFICE VISIT (OUTPATIENT)
Dept: PRIMARY CARE | Facility: CLINIC | Age: 89
End: 2024-02-20
Payer: MEDICARE

## 2024-02-20 ENCOUNTER — LAB (OUTPATIENT)
Dept: LAB | Facility: LAB | Age: 89
End: 2024-02-20
Payer: MEDICARE

## 2024-02-20 VITALS
BODY MASS INDEX: 27.46 KG/M2 | TEMPERATURE: 98.2 F | DIASTOLIC BLOOD PRESSURE: 80 MMHG | HEART RATE: 81 BPM | WEIGHT: 214 LBS | OXYGEN SATURATION: 93 % | SYSTOLIC BLOOD PRESSURE: 132 MMHG | HEIGHT: 74 IN

## 2024-02-20 DIAGNOSIS — G47.33 OBSTRUCTIVE SLEEP APNEA SYNDROME: ICD-10-CM

## 2024-02-20 DIAGNOSIS — R06.02 SOB (SHORTNESS OF BREATH): ICD-10-CM

## 2024-02-20 DIAGNOSIS — E11.9 CONTROLLED TYPE 2 DIABETES MELLITUS WITHOUT COMPLICATION, WITH LONG-TERM CURRENT USE OF INSULIN (MULTI): ICD-10-CM

## 2024-02-20 DIAGNOSIS — H93.13 BILATERAL TINNITUS: ICD-10-CM

## 2024-02-20 DIAGNOSIS — Z79.4 CONTROLLED TYPE 2 DIABETES MELLITUS WITHOUT COMPLICATION, WITH LONG-TERM CURRENT USE OF INSULIN (MULTI): ICD-10-CM

## 2024-02-20 DIAGNOSIS — F51.01 PRIMARY INSOMNIA: ICD-10-CM

## 2024-02-20 DIAGNOSIS — Z79.4 CONTROLLED TYPE 2 DIABETES MELLITUS WITHOUT COMPLICATION, WITH LONG-TERM CURRENT USE OF INSULIN (MULTI): Primary | ICD-10-CM

## 2024-02-20 DIAGNOSIS — E78.2 MIXED HYPERLIPIDEMIA: ICD-10-CM

## 2024-02-20 DIAGNOSIS — E11.9 CONTROLLED TYPE 2 DIABETES MELLITUS WITHOUT COMPLICATION, WITH LONG-TERM CURRENT USE OF INSULIN (MULTI): Primary | ICD-10-CM

## 2024-02-20 DIAGNOSIS — H91.93 BILATERAL HEARING LOSS, UNSPECIFIED HEARING LOSS TYPE: ICD-10-CM

## 2024-02-20 DIAGNOSIS — N40.1 BENIGN PROSTATIC HYPERPLASIA WITH URINARY FREQUENCY: ICD-10-CM

## 2024-02-20 DIAGNOSIS — R35.0 BENIGN PROSTATIC HYPERPLASIA WITH URINARY FREQUENCY: ICD-10-CM

## 2024-02-20 LAB
EST. AVERAGE GLUCOSE BLD GHB EST-MCNC: 131 MG/DL
HBA1C MFR BLD: 6.2 %

## 2024-02-20 PROCEDURE — 83036 HEMOGLOBIN GLYCOSYLATED A1C: CPT

## 2024-02-20 PROCEDURE — 1159F MED LIST DOCD IN RCRD: CPT | Performed by: FAMILY MEDICINE

## 2024-02-20 PROCEDURE — 1160F RVW MEDS BY RX/DR IN RCRD: CPT | Performed by: FAMILY MEDICINE

## 2024-02-20 PROCEDURE — 36415 COLL VENOUS BLD VENIPUNCTURE: CPT

## 2024-02-20 PROCEDURE — 1036F TOBACCO NON-USER: CPT | Performed by: FAMILY MEDICINE

## 2024-02-20 PROCEDURE — 99214 OFFICE O/P EST MOD 30 MIN: CPT | Performed by: FAMILY MEDICINE

## 2024-02-20 RX ORDER — TRAZODONE HYDROCHLORIDE 50 MG/1
50 TABLET ORAL NIGHTLY
Qty: 90 TABLET | Refills: 3 | Status: SHIPPED | OUTPATIENT
Start: 2024-02-20 | End: 2024-05-14 | Stop reason: SDUPTHER

## 2024-02-20 RX ORDER — ALBUTEROL SULFATE 90 UG/1
2 AEROSOL, METERED RESPIRATORY (INHALATION) EVERY 4 HOURS PRN
Qty: 18 G | Refills: 2 | Status: SHIPPED | OUTPATIENT
Start: 2024-02-20 | End: 2025-02-19

## 2024-02-20 RX ORDER — TAMSULOSIN HYDROCHLORIDE 0.4 MG/1
0.8 CAPSULE ORAL DAILY
Qty: 180 CAPSULE | Refills: 1 | Status: SHIPPED | OUTPATIENT
Start: 2024-02-20

## 2024-02-20 ASSESSMENT — ENCOUNTER SYMPTOMS
DIARRHEA: 0
MYALGIAS: 0
DYSPHORIC MOOD: 0
NUMBNESS: 0
HEADACHES: 0
VOMITING: 0
ABDOMINAL PAIN: 0
CONSTIPATION: 0
SORE THROAT: 0
NECK PAIN: 0
NERVOUS/ANXIOUS: 0
BRUISES/BLEEDS EASILY: 0
NAUSEA: 0
COUGH: 0
SHORTNESS OF BREATH: 0
WEAKNESS: 0
DIFFICULTY URINATING: 0
ADENOPATHY: 0
EYE DISCHARGE: 0
BLOOD IN STOOL: 0
FATIGUE: 0
ACTIVITY CHANGE: 0
DIZZINESS: 0
BACK PAIN: 0
CHEST TIGHTNESS: 0
HEMATURIA: 0
ARTHRALGIAS: 0

## 2024-02-22 PROBLEM — I26.99 ACUTE PULMONARY EMBOLISM WITHOUT ACUTE COR PULMONALE (MULTI): Status: RESOLVED | Noted: 2023-06-29 | Resolved: 2024-02-22

## 2024-04-13 DIAGNOSIS — I26.99 ACUTE PULMONARY EMBOLISM WITHOUT ACUTE COR PULMONALE, UNSPECIFIED PULMONARY EMBOLISM TYPE (MULTI): ICD-10-CM

## 2024-04-15 RX ORDER — APIXABAN 5 MG/1
5 TABLET, FILM COATED ORAL 2 TIMES DAILY
Qty: 60 TABLET | Refills: 3 | Status: SHIPPED | OUTPATIENT
Start: 2024-04-15

## 2024-04-15 NOTE — TELEPHONE ENCOUNTER
Rx Refill Request     Name: Chris Russell  :  1930   Medication Name:  Eliquis 5 mg tablet   Specific Pharmacy location:  [facility]/pharmacy #[id number] [place], [place]   Date of last appointment:  2024   Date of next appointment:  2024   Best number to reach patient:  [phone]

## 2024-04-27 DIAGNOSIS — E78.2 MIXED HYPERLIPIDEMIA: ICD-10-CM

## 2024-04-29 RX ORDER — ATORVASTATIN CALCIUM 40 MG/1
40 TABLET, FILM COATED ORAL DAILY
Qty: 90 TABLET | Refills: 3 | Status: SHIPPED | OUTPATIENT
Start: 2024-04-29

## 2024-04-29 NOTE — TELEPHONE ENCOUNTER
Rx Refill Request     Name: Chris Russell  :  1930   Medication Name:  atorvastatin (Lipitor) 40 mg tablet   Specific Pharmacy location:  [facility]/pharmacy [address] [place] [address]   Date of last appointment:  2024   Date of next appointment:  2024   Best number to reach patient:  [phone]

## 2024-05-09 NOTE — PROGRESS NOTES
"Subjective   Patient ID: Chris Russell is a 89 y.o. male who presents for Diabetes and Dizziness.  HPI    Patient here today with his daughter    Diabetes stable  Follow blood work  Follow good diet    Insomnia stable no progression or worsening  Stress at home spouse passed away    High cholesterol stable watch diet follow blood work    BPH stable  Ongoing urinary frequency  On Flomax x 2  Discuss urology evaluation  Other medication adjustments  Patient will consider    Bilateral hearing loss improved  He got a hearing aid    COPD is ongoing  Positive cough   No wheeze or shortness of breath  Review of Systems   Constitutional:  Negative for activity change and fatigue.   HENT:  Negative for congestion and sore throat.    Eyes:  Negative for discharge.   Respiratory:  Negative for cough, chest tightness and shortness of breath.    Cardiovascular:  Negative for chest pain and leg swelling.   Gastrointestinal:  Negative for abdominal pain, blood in stool, constipation, diarrhea, nausea and vomiting.   Endocrine: Negative for cold intolerance and heat intolerance.   Genitourinary:  Negative for difficulty urinating and hematuria.   Musculoskeletal:  Negative for arthralgias, back pain, gait problem, myalgias and neck pain.   Allergic/Immunologic: Negative for environmental allergies.   Neurological:  Negative for dizziness, syncope, weakness, numbness and headaches.   Hematological:  Negative for adenopathy. Does not bruise/bleed easily.   Psychiatric/Behavioral:  Negative for dysphoric mood. The patient is not nervous/anxious.    All other systems reviewed and are negative.      Objective   /73 (BP Location: Left arm, BP Cuff Size: Large adult)   Pulse 80   Ht 1.88 m (6' 2\")   Wt 96.3 kg (212 lb 6.4 oz)   SpO2 94%   BMI 27.27 kg/m²    Physical Exam  Vitals and nursing note reviewed.   Constitutional:       General: He is not in acute distress.     Appearance: Normal appearance.   HENT:      Head: " Normocephalic and atraumatic.      Right Ear: Tympanic membrane, ear canal and external ear normal.      Left Ear: Tympanic membrane, ear canal and external ear normal.      Nose: Nose normal.      Mouth/Throat:      Mouth: Mucous membranes are moist.      Pharynx: Oropharynx is clear. No oropharyngeal exudate or posterior oropharyngeal erythema.   Eyes:      Extraocular Movements: Extraocular movements intact.      Conjunctiva/sclera: Conjunctivae normal.      Pupils: Pupils are equal, round, and reactive to light.   Cardiovascular:      Rate and Rhythm: Normal rate and regular rhythm.      Pulses: Normal pulses.      Heart sounds: Normal heart sounds. No murmur heard.  Pulmonary:      Effort: Pulmonary effort is normal. No respiratory distress.      Breath sounds: Normal breath sounds. No wheezing or rales.   Abdominal:      General: Abdomen is flat. Bowel sounds are normal. There is no distension.      Palpations: Abdomen is soft. There is no mass.      Tenderness: There is no abdominal tenderness.   Musculoskeletal:         General: No swelling or deformity. Normal range of motion.      Cervical back: Normal range of motion and neck supple.      Right lower leg: No edema.      Left lower leg: No edema.   Lymphadenopathy:      Cervical: No cervical adenopathy.   Skin:     General: Skin is warm and dry.      Capillary Refill: Capillary refill takes less than 2 seconds.      Findings: No lesion or rash.   Neurological:      General: No focal deficit present.      Mental Status: He is alert and oriented to person, place, and time.      Cranial Nerves: No cranial nerve deficit.      Motor: No weakness.   Psychiatric:         Mood and Affect: Mood normal.         Behavior: Behavior normal.         Thought Content: Thought content normal.         Judgment: Judgment normal.         Assessment/Plan   Problem List Items Addressed This Visit       Mixed hyperlipidemia    Benign prostatic hyperplasia with urinary frequency     Controlled type 2 diabetes mellitus without complication, with long-term current use of insulin (Multi) - Primary    Bilateral hearing loss     Other Visit Diagnoses       Primary insomnia        Relevant Medications    traZODone (Desyrel) 50 mg tablet    Panlobular emphysema (Multi)        Chronic cough        Relevant Orders    Follow Up In [facility] - PCP    XR chest 2 views            Patient education provided.  Stay current with age appropriate health maintenance as instructed.  Appointment here or ER with new or worsening symptoms'  Keep appropriate follow-up visit.  Stay current with proper immunizations   Check prevnar  Pt states he had at [facility] continue medications refill provided  Chest x-ray because of the cough  Further workup with persistence  Discussed at length with patient and daughter  Follow-up visit 3 months

## 2024-05-14 ENCOUNTER — OFFICE VISIT (OUTPATIENT)
Dept: PRIMARY CARE | Facility: CLINIC | Age: 89
End: 2024-05-14
Payer: MEDICARE

## 2024-05-14 VITALS
WEIGHT: 212.4 LBS | DIASTOLIC BLOOD PRESSURE: 73 MMHG | HEIGHT: 74 IN | OXYGEN SATURATION: 94 % | HEART RATE: 80 BPM | BODY MASS INDEX: 27.26 KG/M2 | SYSTOLIC BLOOD PRESSURE: 133 MMHG

## 2024-05-14 DIAGNOSIS — F51.01 PRIMARY INSOMNIA: ICD-10-CM

## 2024-05-14 DIAGNOSIS — J43.1 PANLOBULAR EMPHYSEMA (MULTI): ICD-10-CM

## 2024-05-14 DIAGNOSIS — H91.93 BILATERAL HEARING LOSS, UNSPECIFIED HEARING LOSS TYPE: ICD-10-CM

## 2024-05-14 DIAGNOSIS — R35.0 BENIGN PROSTATIC HYPERPLASIA WITH URINARY FREQUENCY: ICD-10-CM

## 2024-05-14 DIAGNOSIS — N40.1 BENIGN PROSTATIC HYPERPLASIA WITH URINARY FREQUENCY: ICD-10-CM

## 2024-05-14 DIAGNOSIS — E11.9 CONTROLLED TYPE 2 DIABETES MELLITUS WITHOUT COMPLICATION, WITH LONG-TERM CURRENT USE OF INSULIN (MULTI): Primary | ICD-10-CM

## 2024-05-14 DIAGNOSIS — R05.3 CHRONIC COUGH: ICD-10-CM

## 2024-05-14 DIAGNOSIS — Z79.4 CONTROLLED TYPE 2 DIABETES MELLITUS WITHOUT COMPLICATION, WITH LONG-TERM CURRENT USE OF INSULIN (MULTI): Primary | ICD-10-CM

## 2024-05-14 DIAGNOSIS — E78.2 MIXED HYPERLIPIDEMIA: ICD-10-CM

## 2024-05-14 PROCEDURE — 1160F RVW MEDS BY RX/DR IN RCRD: CPT | Performed by: FAMILY MEDICINE

## 2024-05-14 PROCEDURE — 1159F MED LIST DOCD IN RCRD: CPT | Performed by: FAMILY MEDICINE

## 2024-05-14 PROCEDURE — 99214 OFFICE O/P EST MOD 30 MIN: CPT | Performed by: FAMILY MEDICINE

## 2024-05-14 PROCEDURE — 1036F TOBACCO NON-USER: CPT | Performed by: FAMILY MEDICINE

## 2024-05-14 RX ORDER — TRAZODONE HYDROCHLORIDE 50 MG/1
50 TABLET ORAL NIGHTLY
Qty: 90 TABLET | Refills: 3 | Status: SHIPPED | OUTPATIENT
Start: 2024-05-14 | End: 2025-05-14

## 2024-05-14 ASSESSMENT — ENCOUNTER SYMPTOMS
DIZZINESS: 0
NECK PAIN: 0
SHORTNESS OF BREATH: 0
SORE THROAT: 0
DIARRHEA: 0
NAUSEA: 0
ACTIVITY CHANGE: 0
CONSTIPATION: 0
DIFFICULTY URINATING: 0
VOMITING: 0
ADENOPATHY: 0
ARTHRALGIAS: 0
COUGH: 0
ABDOMINAL PAIN: 0
EYE DISCHARGE: 0
NUMBNESS: 0
HEADACHES: 0
FATIGUE: 0
WEAKNESS: 0
BLOOD IN STOOL: 0
HEMATURIA: 0
DYSPHORIC MOOD: 0
BACK PAIN: 0
MYALGIAS: 0
BRUISES/BLEEDS EASILY: 0
NERVOUS/ANXIOUS: 0
CHEST TIGHTNESS: 0

## 2024-06-04 ENCOUNTER — HOSPITAL ENCOUNTER (OUTPATIENT)
Dept: RADIOLOGY | Facility: HOSPITAL | Age: 89
Discharge: HOME | End: 2024-06-04
Payer: MEDICARE

## 2024-06-04 DIAGNOSIS — R05.3 CHRONIC COUGH: ICD-10-CM

## 2024-06-04 PROCEDURE — 71046 X-RAY EXAM CHEST 2 VIEWS: CPT

## 2024-06-04 PROCEDURE — 71046 X-RAY EXAM CHEST 2 VIEWS: CPT | Performed by: RADIOLOGY

## 2024-07-05 DIAGNOSIS — N40.1 BENIGN PROSTATIC HYPERPLASIA WITH URINARY FREQUENCY: ICD-10-CM

## 2024-07-05 DIAGNOSIS — R35.0 BENIGN PROSTATIC HYPERPLASIA WITH URINARY FREQUENCY: ICD-10-CM

## 2024-07-05 RX ORDER — TAMSULOSIN HYDROCHLORIDE 0.4 MG/1
0.4 CAPSULE ORAL DAILY
Qty: 90 CAPSULE | Refills: 1 | Status: SHIPPED | OUTPATIENT
Start: 2024-07-05

## 2024-07-05 NOTE — TELEPHONE ENCOUNTER
Rx Refill Request Telephone Encounter    Name:  Chris Russell  :  [id number]  Medication Name:  tamsulosin (Flomax) 0.4 mg 24 hr capsule   Specific Pharmacy location:  [facility]/pharmacy #[id number] [address]   Date of last appointment:  24  Date of next appointment:  24

## 2024-08-08 DIAGNOSIS — R35.0 BENIGN PROSTATIC HYPERPLASIA WITH URINARY FREQUENCY: ICD-10-CM

## 2024-08-08 DIAGNOSIS — N40.1 BENIGN PROSTATIC HYPERPLASIA WITH URINARY FREQUENCY: ICD-10-CM

## 2024-08-08 RX ORDER — TAMSULOSIN HYDROCHLORIDE 0.4 MG/1
0.8 CAPSULE ORAL DAILY
Qty: 180 CAPSULE | Refills: 1 | Status: SHIPPED | OUTPATIENT
Start: 2024-08-08

## 2024-08-08 NOTE — TELEPHONE ENCOUNTER
Rx Refill Request     Name: Chris Russell  :  1930   Medication Name:  tamsulosin (Flomax) 0.4 mg 24 hr capsule   Specific Pharmacy location:  [facility]/pharmacy #[id number] [place], [place]   Date of last appointment:  2024   Date of next appointment:  2024   Best number to reach patient:  [phone]

## 2024-08-09 DIAGNOSIS — I26.99 ACUTE PULMONARY EMBOLISM WITHOUT ACUTE COR PULMONALE, UNSPECIFIED PULMONARY EMBOLISM TYPE (MULTI): ICD-10-CM

## 2024-08-09 NOTE — PROGRESS NOTES
Subjective   Reason for Visit: Chris Russell is an 89 y.o. male here for a Medicare Wellness visit.     Past Medical, Surgical, and Family History reviewed and updated in chart.  Patient here for annual Medicare wellness    Also general checkup    Depressed mood  Spouse is diet and has been difficult for the patient  No suicidal ideation no psychotic or manic symptoms    Insomnia stable no progression or worsening    Shortness of breath and chronic cough stable  Since COVID  He declines further workup or evaluation    Patient here today with daughter       DepressionPatient is not experiencing: nervousness/anxiety and shortness of breath.      Diabetes  Pertinent negatives for hypoglycemia include no dizziness, headaches or nervousness/anxiousness. Pertinent negatives for diabetes include no chest pain, no fatigue and no weakness.       Patient Care Team:  Pierce Chambers MD as PCP - General (Family Medicine)  Pierce Chambers MD as PCP - [employer] PCP     Review of Systems   Constitutional:  Negative for activity change and fatigue.   HENT:  Negative for congestion and sore throat.    Eyes:  Negative for discharge.   Respiratory:  Negative for cough, chest tightness and shortness of breath.    Cardiovascular:  Negative for chest pain and leg swelling.   Gastrointestinal:  Negative for abdominal pain, blood in stool, constipation, diarrhea, nausea and vomiting.   Endocrine: Negative for cold intolerance and heat intolerance.   Genitourinary:  Negative for difficulty urinating and hematuria.   Musculoskeletal:  Negative for arthralgias, back pain, gait problem, myalgias and neck pain.   Allergic/Immunologic: Negative for environmental allergies.   Neurological:  Negative for dizziness, syncope, weakness, numbness and headaches.   Hematological:  Negative for adenopathy. Does not bruise/bleed easily.   Psychiatric/Behavioral:  Positive for depression. Negative for dysphoric mood. The patient  "is not nervous/anxious.    All other systems reviewed and are negative.      Objective   Vitals:  /73 (BP Location: Right arm, BP Cuff Size: Large adult)   Pulse 108   Ht 1.88 m (6' 2\")   Wt 97.6 kg (215 lb 3.2 oz)   SpO2 94%   BMI 27.63 kg/m²       Physical Exam  Vitals and nursing note reviewed.   Constitutional:       General: He is not in acute distress.     Appearance: Normal appearance.   HENT:      Head: Normocephalic and atraumatic.      Right Ear: Tympanic membrane, ear canal and external ear normal.      Left Ear: Tympanic membrane, ear canal and external ear normal.      Nose: Nose normal.      Mouth/Throat:      Mouth: Mucous membranes are moist.      Pharynx: Oropharynx is clear. No oropharyngeal exudate or posterior oropharyngeal erythema.   Eyes:      Extraocular Movements: Extraocular movements intact.      Conjunctiva/sclera: Conjunctivae normal.      Pupils: Pupils are equal, round, and reactive to light.   Cardiovascular:      Rate and Rhythm: Normal rate and regular rhythm.      Pulses: Normal pulses.      Heart sounds: Normal heart sounds. No murmur heard.  Pulmonary:      Effort: Pulmonary effort is normal. No respiratory distress.      Breath sounds: Normal breath sounds. No wheezing or rales.   Abdominal:      General: Abdomen is flat. Bowel sounds are normal. There is no distension.      Palpations: Abdomen is soft. There is no mass.      Tenderness: There is no abdominal tenderness.   Musculoskeletal:         General: No swelling or deformity. Normal range of motion.      Cervical back: Normal range of motion and neck supple.      Right lower leg: No edema.      Left lower leg: No edema.   Lymphadenopathy:      Cervical: No cervical adenopathy.   Skin:     General: Skin is warm and dry.      Capillary Refill: Capillary refill takes less than 2 seconds.      Findings: No lesion or rash.   Neurological:      General: No focal deficit present.      Mental Status: He is alert and " oriented to person, place, and time.      Cranial Nerves: No cranial nerve deficit.      Motor: No weakness.   Psychiatric:         Mood and Affect: Mood normal.         Behavior: Behavior normal.         Thought Content: Thought content normal.         Judgment: Judgment normal.         Assessment/Plan   Problem List Items Addressed This Visit    None  Visit Diagnoses         Codes    Routine general medical examination at health care facility    -  Primary Z00.00    Chronic cough     R05.3    SOB (shortness of breath)     R06.02    Relevant Medications    albuterol (Ventolin HFA) 90 mcg/actuation inhaler    Primary insomnia     F51.01    Relevant Medications    traZODone (Desyrel) 100 mg tablet    Current mild episode of major depressive disorder without prior episode (CMS-HCC)     F32.0    Relevant Medications    buPROPion XL (Wellbutrin XL) 150 mg 24 hr tablet          ACP reviewed    Patient education provided.  Stay current with age appropriate health maintenance as instructed.  Appointment here or ER with new or worsening symptoms'  Keep appropriate follow-up visit.  Stay current with proper immunizations  D/w pt and daughter    Patient denies any suicidal ideation.  No psychotic or manic symptoms noted.    Recommend 3-month recheck  Medication as noted

## 2024-08-12 RX ORDER — APIXABAN 5 MG/1
5 TABLET, FILM COATED ORAL 2 TIMES DAILY
Qty: 60 TABLET | Refills: 3 | Status: SHIPPED | OUTPATIENT
Start: 2024-08-12

## 2024-08-12 NOTE — TELEPHONE ENCOUNTER
Rx Refill Request     Name: Chris Russell  :  1930   Medication Name:  Eliquis 5 mg tablet   Specific Pharmacy location:  [facility]/pharmacy #[id number] [place], [place]   Date of last appointment:  2024   Date of next appointment:  2024   Best number to reach patient:  [phone]

## 2024-08-13 ENCOUNTER — APPOINTMENT (OUTPATIENT)
Dept: PRIMARY CARE | Facility: CLINIC | Age: 89
End: 2024-08-13
Payer: MEDICARE

## 2024-08-13 VITALS
WEIGHT: 215.2 LBS | OXYGEN SATURATION: 94 % | BODY MASS INDEX: 27.62 KG/M2 | DIASTOLIC BLOOD PRESSURE: 73 MMHG | SYSTOLIC BLOOD PRESSURE: 119 MMHG | HEIGHT: 74 IN | HEART RATE: 108 BPM

## 2024-08-13 DIAGNOSIS — R06.02 SOB (SHORTNESS OF BREATH): ICD-10-CM

## 2024-08-13 DIAGNOSIS — R05.3 CHRONIC COUGH: ICD-10-CM

## 2024-08-13 DIAGNOSIS — Z00.00 ROUTINE GENERAL MEDICAL EXAMINATION AT HEALTH CARE FACILITY: Primary | ICD-10-CM

## 2024-08-13 DIAGNOSIS — F32.0 CURRENT MILD EPISODE OF MAJOR DEPRESSIVE DISORDER WITHOUT PRIOR EPISODE (CMS-HCC): ICD-10-CM

## 2024-08-13 DIAGNOSIS — F51.01 PRIMARY INSOMNIA: ICD-10-CM

## 2024-08-13 PROCEDURE — 1158F ADVNC CARE PLAN TLK DOCD: CPT | Performed by: FAMILY MEDICINE

## 2024-08-13 PROCEDURE — 1159F MED LIST DOCD IN RCRD: CPT | Performed by: FAMILY MEDICINE

## 2024-08-13 PROCEDURE — 1123F ACP DISCUSS/DSCN MKR DOCD: CPT | Performed by: FAMILY MEDICINE

## 2024-08-13 PROCEDURE — 1160F RVW MEDS BY RX/DR IN RCRD: CPT | Performed by: FAMILY MEDICINE

## 2024-08-13 PROCEDURE — 1170F FXNL STATUS ASSESSED: CPT | Performed by: FAMILY MEDICINE

## 2024-08-13 PROCEDURE — G0439 PPPS, SUBSEQ VISIT: HCPCS | Performed by: FAMILY MEDICINE

## 2024-08-13 PROCEDURE — 99214 OFFICE O/P EST MOD 30 MIN: CPT | Performed by: FAMILY MEDICINE

## 2024-08-13 RX ORDER — ALBUTEROL SULFATE 90 UG/1
2 INHALANT RESPIRATORY (INHALATION) EVERY 4 HOURS PRN
Qty: 18 G | Refills: 2 | Status: SHIPPED | OUTPATIENT
Start: 2024-08-13 | End: 2025-08-13

## 2024-08-13 RX ORDER — BUPROPION HYDROCHLORIDE 150 MG/1
150 TABLET ORAL EVERY MORNING
Qty: 30 TABLET | Refills: 3 | Status: SHIPPED | OUTPATIENT
Start: 2024-08-13 | End: 2025-08-13

## 2024-08-13 RX ORDER — TRAZODONE HYDROCHLORIDE 100 MG/1
100 TABLET ORAL NIGHTLY PRN
Qty: 30 TABLET | Refills: 5 | Status: SHIPPED | OUTPATIENT
Start: 2024-08-13 | End: 2025-08-13

## 2024-08-13 ASSESSMENT — PATIENT HEALTH QUESTIONNAIRE - PHQ9
3. TROUBLE FALLING OR STAYING ASLEEP OR SLEEPING TOO MUCH: NEARLY EVERY DAY
SUM OF ALL RESPONSES TO PHQ QUESTIONS 1-9: 6
4. FEELING TIRED OR HAVING LITTLE ENERGY: NOT AT ALL
1. LITTLE INTEREST OR PLEASURE IN DOING THINGS: MORE THAN HALF THE DAYS
SUM OF ALL RESPONSES TO PHQ9 QUESTIONS 1 AND 2: 3
10. IF YOU CHECKED OFF ANY PROBLEMS, HOW DIFFICULT HAVE THESE PROBLEMS MADE IT FOR YOU TO DO YOUR WORK, TAKE CARE OF THINGS AT HOME, OR GET ALONG WITH OTHER PEOPLE: SOMEWHAT DIFFICULT
6. FEELING BAD ABOUT YOURSELF - OR THAT YOU ARE A FAILURE OR HAVE LET YOURSELF OR YOUR FAMILY DOWN: NOT AT ALL
8. MOVING OR SPEAKING SO SLOWLY THAT OTHER PEOPLE COULD HAVE NOTICED. OR THE OPPOSITE, BEING SO FIGETY OR RESTLESS THAT YOU HAVE BEEN MOVING AROUND A LOT MORE THAN USUAL: NOT AT ALL
5. POOR APPETITE OR OVEREATING: NOT AT ALL
2. FEELING DOWN, DEPRESSED OR HOPELESS: SEVERAL DAYS
9. THOUGHTS THAT YOU WOULD BE BETTER OFF DEAD, OR OF HURTING YOURSELF: NOT AT ALL
7. TROUBLE CONCENTRATING ON THINGS, SUCH AS READING THE NEWSPAPER OR WATCHING TELEVISION: NOT AT ALL

## 2024-08-13 ASSESSMENT — ENCOUNTER SYMPTOMS
NECK PAIN: 0
NUMBNESS: 0
DEPRESSION: 0
VOMITING: 0
HEMATURIA: 0
NAUSEA: 0
LOSS OF SENSATION IN FEET: 0
CONSTIPATION: 0
WEAKNESS: 0
MYALGIAS: 0
ABDOMINAL PAIN: 0
FATIGUE: 0
SHORTNESS OF BREATH: 0
OCCASIONAL FEELINGS OF UNSTEADINESS: 0
DIARRHEA: 0
NERVOUS/ANXIOUS: 0
ARTHRALGIAS: 0
DIZZINESS: 0
HEADACHES: 0
COUGH: 0
SORE THROAT: 0
DYSPHORIC MOOD: 0
ACTIVITY CHANGE: 0
EYE DISCHARGE: 0
DEPRESSION: 1
ADENOPATHY: 0
DIFFICULTY URINATING: 0
BACK PAIN: 0
BRUISES/BLEEDS EASILY: 0
BLOOD IN STOOL: 0
CHEST TIGHTNESS: 0

## 2024-08-13 ASSESSMENT — ACTIVITIES OF DAILY LIVING (ADL)
GROCERY_SHOPPING: NEEDS ASSISTANCE
DOING_HOUSEWORK: NEEDS ASSISTANCE
MANAGING_FINANCES: NEEDS ASSISTANCE
TAKING_MEDICATION: NEEDS ASSISTANCE
BATHING: INDEPENDENT

## 2024-11-12 ENCOUNTER — APPOINTMENT (OUTPATIENT)
Dept: PRIMARY CARE | Facility: CLINIC | Age: 89
End: 2024-11-12
Payer: MEDICARE

## 2024-11-12 VITALS
HEART RATE: 99 BPM | BODY MASS INDEX: 27.59 KG/M2 | WEIGHT: 215 LBS | RESPIRATION RATE: 16 BRPM | TEMPERATURE: 96.4 F | HEIGHT: 74 IN | DIASTOLIC BLOOD PRESSURE: 60 MMHG | OXYGEN SATURATION: 97 % | SYSTOLIC BLOOD PRESSURE: 122 MMHG

## 2024-11-12 DIAGNOSIS — E11.9 CONTROLLED TYPE 2 DIABETES MELLITUS WITHOUT COMPLICATION, WITH LONG-TERM CURRENT USE OF INSULIN (MULTI): ICD-10-CM

## 2024-11-12 DIAGNOSIS — R35.0 BENIGN PROSTATIC HYPERPLASIA WITH URINARY FREQUENCY: ICD-10-CM

## 2024-11-12 DIAGNOSIS — I26.99 ACUTE PULMONARY EMBOLISM WITHOUT ACUTE COR PULMONALE, UNSPECIFIED PULMONARY EMBOLISM TYPE (MULTI): ICD-10-CM

## 2024-11-12 DIAGNOSIS — G47.33 OBSTRUCTIVE SLEEP APNEA SYNDROME: ICD-10-CM

## 2024-11-12 DIAGNOSIS — R25.1 TREMOR: Primary | ICD-10-CM

## 2024-11-12 DIAGNOSIS — R42 VERTIGO: ICD-10-CM

## 2024-11-12 DIAGNOSIS — N40.1 BENIGN PROSTATIC HYPERPLASIA WITH URINARY FREQUENCY: ICD-10-CM

## 2024-11-12 DIAGNOSIS — E11.65 TYPE 2 DIABETES MELLITUS WITH HYPERGLYCEMIA, WITHOUT LONG-TERM CURRENT USE OF INSULIN: ICD-10-CM

## 2024-11-12 DIAGNOSIS — R06.02 SOB (SHORTNESS OF BREATH): ICD-10-CM

## 2024-11-12 DIAGNOSIS — Z79.4 CONTROLLED TYPE 2 DIABETES MELLITUS WITHOUT COMPLICATION, WITH LONG-TERM CURRENT USE OF INSULIN (MULTI): ICD-10-CM

## 2024-11-12 DIAGNOSIS — E78.2 MIXED HYPERLIPIDEMIA: ICD-10-CM

## 2024-11-12 DIAGNOSIS — I26.99 ACUTE PULMONARY EMBOLISM WITHOUT ACUTE COR PULMONALE, UNSPECIFIED PULMONARY EMBOLISM TYPE (MULTI): Primary | ICD-10-CM

## 2024-11-12 PROCEDURE — 99214 OFFICE O/P EST MOD 30 MIN: CPT | Performed by: FAMILY MEDICINE

## 2024-11-12 PROCEDURE — 1159F MED LIST DOCD IN RCRD: CPT | Performed by: FAMILY MEDICINE

## 2024-11-12 PROCEDURE — 1160F RVW MEDS BY RX/DR IN RCRD: CPT | Performed by: FAMILY MEDICINE

## 2024-11-12 PROCEDURE — 1123F ACP DISCUSS/DSCN MKR DOCD: CPT | Performed by: FAMILY MEDICINE

## 2024-11-12 RX ORDER — ALBUTEROL SULFATE 90 UG/1
2 INHALANT RESPIRATORY (INHALATION) EVERY 4 HOURS PRN
Qty: 18 G | Refills: 2 | Status: SHIPPED | OUTPATIENT
Start: 2024-11-12 | End: 2025-11-12

## 2024-11-12 ASSESSMENT — PATIENT HEALTH QUESTIONNAIRE - PHQ9
2. FEELING DOWN, DEPRESSED OR HOPELESS: NOT AT ALL
1. LITTLE INTEREST OR PLEASURE IN DOING THINGS: NOT AT ALL
SUM OF ALL RESPONSES TO PHQ9 QUESTIONS 1 AND 2: 0

## 2024-11-12 ASSESSMENT — ENCOUNTER SYMPTOMS
NAUSEA: 0
VOMITING: 0
ACTIVITY CHANGE: 0
HEADACHES: 0
EYE DISCHARGE: 0
DIARRHEA: 0
ABDOMINAL PAIN: 0
SORE THROAT: 0
OCCASIONAL FEELINGS OF UNSTEADINESS: 0
NECK PAIN: 0
DYSPHORIC MOOD: 0
COUGH: 0
HEMATURIA: 0
ADENOPATHY: 0
CONSTIPATION: 0
MYALGIAS: 0
SHORTNESS OF BREATH: 0
NUMBNESS: 0
DEPRESSION: 0
DIFFICULTY URINATING: 0
LOSS OF SENSATION IN FEET: 0
ARTHRALGIAS: 0
WEAKNESS: 0
NERVOUS/ANXIOUS: 0
BRUISES/BLEEDS EASILY: 0
BLOOD IN STOOL: 0
DIZZINESS: 0
BACK PAIN: 0
CHEST TIGHTNESS: 0
FATIGUE: 0

## 2024-11-12 NOTE — TELEPHONE ENCOUNTER
Ayala called in, she went to [facility] to  the prescriptions and the cost for the generic Eliquis is over $100. She asked if Dr. Chambers could resend the RX for the name brand Eliquis to [facility]. Please advise.

## 2024-11-13 RX ORDER — APIXABAN 5 MG/1
5 TABLET, FILM COATED ORAL 2 TIMES DAILY
Qty: 60 TABLET | Refills: 3 | Status: SHIPPED | OUTPATIENT
Start: 2024-11-13

## 2024-11-22 DIAGNOSIS — I26.99 ACUTE PULMONARY EMBOLISM WITHOUT ACUTE COR PULMONALE, UNSPECIFIED PULMONARY EMBOLISM TYPE (MULTI): ICD-10-CM

## 2024-11-22 RX ORDER — APIXABAN 5 MG/1
5 TABLET, FILM COATED ORAL 2 TIMES DAILY
Qty: 60 TABLET | Refills: 3 | Status: SHIPPED | OUTPATIENT
Start: 2024-11-22

## 2024-11-22 NOTE — TELEPHONE ENCOUNTER
Ayala called in, the medication is still too [facility]. She asked if the RX for the Eliquis 5 mg tablet could be sent to [facility] instead. Please advise.

## 2024-12-20 ENCOUNTER — LAB (OUTPATIENT)
Dept: LAB | Facility: LAB | Age: 89
End: 2024-12-20
Payer: MEDICARE

## 2024-12-20 DIAGNOSIS — E78.2 MIXED HYPERLIPIDEMIA: ICD-10-CM

## 2024-12-20 DIAGNOSIS — Z79.4 CONTROLLED TYPE 2 DIABETES MELLITUS WITHOUT COMPLICATION, WITH LONG-TERM CURRENT USE OF INSULIN (MULTI): ICD-10-CM

## 2024-12-20 DIAGNOSIS — R06.02 SOB (SHORTNESS OF BREATH): ICD-10-CM

## 2024-12-20 DIAGNOSIS — E11.9 CONTROLLED TYPE 2 DIABETES MELLITUS WITHOUT COMPLICATION, WITH LONG-TERM CURRENT USE OF INSULIN (MULTI): ICD-10-CM

## 2024-12-20 LAB
ALBUMIN SERPL BCP-MCNC: 3.7 G/DL (ref 3.4–5)
ALP SERPL-CCNC: 114 U/L (ref 33–136)
ALT SERPL W P-5'-P-CCNC: 91 U/L (ref 10–52)
ANION GAP SERPL CALC-SCNC: 14 MMOL/L (ref 10–20)
AST SERPL W P-5'-P-CCNC: 43 U/L (ref 9–39)
BASOPHILS # BLD AUTO: 0.05 X10*3/UL (ref 0–0.1)
BASOPHILS NFR BLD AUTO: 0.4 %
BILIRUB SERPL-MCNC: 0.4 MG/DL (ref 0–1.2)
BUN SERPL-MCNC: 35 MG/DL (ref 6–23)
CALCIUM SERPL-MCNC: 10 MG/DL (ref 8.6–10.3)
CHLORIDE SERPL-SCNC: 99 MMOL/L (ref 98–107)
CHOLEST SERPL-MCNC: 225 MG/DL (ref 0–199)
CHOLESTEROL/HDL RATIO: 5.6
CO2 SERPL-SCNC: 30 MMOL/L (ref 21–32)
CREAT SERPL-MCNC: 1.59 MG/DL (ref 0.5–1.3)
EGFRCR SERPLBLD CKD-EPI 2021: 40 ML/MIN/1.73M*2
EOSINOPHIL # BLD AUTO: 0.15 X10*3/UL (ref 0–0.4)
EOSINOPHIL NFR BLD AUTO: 1.2 %
ERYTHROCYTE [DISTWIDTH] IN BLOOD BY AUTOMATED COUNT: 15.8 % (ref 11.5–14.5)
EST. AVERAGE GLUCOSE BLD GHB EST-MCNC: 134 MG/DL
GLUCOSE SERPL-MCNC: 126 MG/DL (ref 74–99)
HBA1C MFR BLD: 6.3 %
HCT VFR BLD AUTO: 31.9 % (ref 41–52)
HDLC SERPL-MCNC: 40.2 MG/DL
HGB BLD-MCNC: 9.9 G/DL (ref 13.5–17.5)
IMM GRANULOCYTES # BLD AUTO: 0.07 X10*3/UL (ref 0–0.5)
IMM GRANULOCYTES NFR BLD AUTO: 0.6 % (ref 0–0.9)
LDLC SERPL CALC-MCNC: 135 MG/DL
LYMPHOCYTES # BLD AUTO: 3.08 X10*3/UL (ref 0.8–3)
LYMPHOCYTES NFR BLD AUTO: 25.4 %
MCH RBC QN AUTO: 27 PG (ref 26–34)
MCHC RBC AUTO-ENTMCNC: 31 G/DL (ref 32–36)
MCV RBC AUTO: 87 FL (ref 80–100)
MONOCYTES # BLD AUTO: 0.94 X10*3/UL (ref 0.05–0.8)
MONOCYTES NFR BLD AUTO: 7.8 %
NEUTROPHILS # BLD AUTO: 7.82 X10*3/UL (ref 1.6–5.5)
NEUTROPHILS NFR BLD AUTO: 64.6 %
NON HDL CHOLESTEROL: 185 MG/DL (ref 0–149)
NRBC BLD-RTO: 0 /100 WBCS (ref 0–0)
PLATELET # BLD AUTO: 590 X10*3/UL (ref 150–450)
POTASSIUM SERPL-SCNC: 4.9 MMOL/L (ref 3.5–5.3)
PROT SERPL-MCNC: 6.7 G/DL (ref 6.4–8.2)
RBC # BLD AUTO: 3.67 X10*6/UL (ref 4.5–5.9)
SODIUM SERPL-SCNC: 138 MMOL/L (ref 136–145)
TRIGL SERPL-MCNC: 247 MG/DL (ref 0–149)
VLDL: 49 MG/DL (ref 0–40)
WBC # BLD AUTO: 12.1 X10*3/UL (ref 4.4–11.3)

## 2024-12-20 PROCEDURE — 85025 COMPLETE CBC W/AUTO DIFF WBC: CPT

## 2024-12-20 PROCEDURE — 80061 LIPID PANEL: CPT

## 2024-12-20 PROCEDURE — 80053 COMPREHEN METABOLIC PANEL: CPT

## 2024-12-20 PROCEDURE — 36415 COLL VENOUS BLD VENIPUNCTURE: CPT

## 2024-12-20 PROCEDURE — 83036 HEMOGLOBIN GLYCOSYLATED A1C: CPT

## 2025-01-04 DIAGNOSIS — F51.01 PRIMARY INSOMNIA: ICD-10-CM

## 2025-01-06 RX ORDER — TRAZODONE HYDROCHLORIDE 50 MG/1
50 TABLET ORAL NIGHTLY
Qty: 90 TABLET | Refills: 0 | Status: SHIPPED | OUTPATIENT
Start: 2025-01-06 | End: 2026-01-06

## 2025-01-27 ENCOUNTER — APPOINTMENT (OUTPATIENT)
Dept: RADIOLOGY | Facility: HOSPITAL | Age: OVER 89
End: 2025-01-27
Payer: MEDICARE

## 2025-01-27 ENCOUNTER — HOSPITAL ENCOUNTER (INPATIENT)
Facility: HOSPITAL | Age: OVER 89
End: 2025-01-27
Attending: EMERGENCY MEDICINE | Admitting: STUDENT IN AN ORGANIZED HEALTH CARE EDUCATION/TRAINING PROGRAM
Payer: MEDICARE

## 2025-01-27 ENCOUNTER — APPOINTMENT (OUTPATIENT)
Dept: CARDIOLOGY | Facility: HOSPITAL | Age: OVER 89
End: 2025-01-27
Payer: MEDICARE

## 2025-01-27 DIAGNOSIS — I10 PRIMARY HYPERTENSION: ICD-10-CM

## 2025-01-27 DIAGNOSIS — R53.1 GENERALIZED WEAKNESS: ICD-10-CM

## 2025-01-27 DIAGNOSIS — J18.9 COMMUNITY ACQUIRED PNEUMONIA, UNSPECIFIED LATERALITY: Primary | ICD-10-CM

## 2025-01-27 DIAGNOSIS — R06.02 SHORTNESS OF BREATH: ICD-10-CM

## 2025-01-27 DIAGNOSIS — I50.21 ACUTE HFREF (HEART FAILURE WITH REDUCED EJECTION FRACTION): ICD-10-CM

## 2025-01-27 DIAGNOSIS — R41.0 DELIRIUM: ICD-10-CM

## 2025-01-27 LAB
ALBUMIN SERPL BCP-MCNC: 3.8 G/DL (ref 3.4–5)
ALP SERPL-CCNC: 139 U/L (ref 33–136)
ALT SERPL W P-5'-P-CCNC: 124 U/L (ref 10–52)
ANION GAP SERPL CALC-SCNC: 15 MMOL/L (ref 10–20)
APPEARANCE UR: CLEAR
AST SERPL W P-5'-P-CCNC: 71 U/L (ref 9–39)
ATRIAL RATE: 88 BPM
BACTERIA #/AREA URNS AUTO: ABNORMAL /HPF
BASOPHILS # BLD AUTO: 0.02 X10*3/UL (ref 0–0.1)
BASOPHILS NFR BLD AUTO: 0.2 %
BILIRUB SERPL-MCNC: 0.4 MG/DL (ref 0–1.2)
BILIRUB UR STRIP.AUTO-MCNC: NEGATIVE MG/DL
BUN SERPL-MCNC: 32 MG/DL (ref 6–23)
CALCIUM SERPL-MCNC: 9.2 MG/DL (ref 8.6–10.3)
CARDIAC TROPONIN I PNL SERPL HS: 19 NG/L (ref 0–20)
CARDIAC TROPONIN I PNL SERPL HS: 23 NG/L (ref 0–20)
CHLORIDE SERPL-SCNC: 98 MMOL/L (ref 98–107)
CO2 SERPL-SCNC: 27 MMOL/L (ref 21–32)
COLOR UR: YELLOW
CREAT SERPL-MCNC: 1.53 MG/DL (ref 0.5–1.3)
EGFRCR SERPLBLD CKD-EPI 2021: 42 ML/MIN/1.73M*2
EOSINOPHIL # BLD AUTO: 0.05 X10*3/UL (ref 0–0.4)
EOSINOPHIL NFR BLD AUTO: 0.5 %
ERYTHROCYTE [DISTWIDTH] IN BLOOD BY AUTOMATED COUNT: 16.5 % (ref 11.5–14.5)
FLUAV RNA RESP QL NAA+PROBE: NOT DETECTED
FLUBV RNA RESP QL NAA+PROBE: NOT DETECTED
GLUCOSE BLD MANUAL STRIP-MCNC: 164 MG/DL (ref 74–99)
GLUCOSE SERPL-MCNC: 164 MG/DL (ref 74–99)
GLUCOSE UR STRIP.AUTO-MCNC: NORMAL MG/DL
HCT VFR BLD AUTO: 29.4 % (ref 41–52)
HGB BLD-MCNC: 9.4 G/DL (ref 13.5–17.5)
IMM GRANULOCYTES # BLD AUTO: 0.05 X10*3/UL (ref 0–0.5)
IMM GRANULOCYTES NFR BLD AUTO: 0.5 % (ref 0–0.9)
KETONES UR STRIP.AUTO-MCNC: NEGATIVE MG/DL
LACTATE SERPL-SCNC: 1.9 MMOL/L (ref 0.4–2)
LEUKOCYTE ESTERASE UR QL STRIP.AUTO: NEGATIVE
LYMPHOCYTES # BLD AUTO: 1.73 X10*3/UL (ref 0.8–3)
LYMPHOCYTES NFR BLD AUTO: 15.7 %
MCH RBC QN AUTO: 25.8 PG (ref 26–34)
MCHC RBC AUTO-ENTMCNC: 32 G/DL (ref 32–36)
MCV RBC AUTO: 81 FL (ref 80–100)
MONOCYTES # BLD AUTO: 0.8 X10*3/UL (ref 0.05–0.8)
MONOCYTES NFR BLD AUTO: 7.3 %
MUCOUS THREADS #/AREA URNS AUTO: ABNORMAL /LPF
NEUTROPHILS # BLD AUTO: 8.37 X10*3/UL (ref 1.6–5.5)
NEUTROPHILS NFR BLD AUTO: 75.8 %
NITRITE UR QL STRIP.AUTO: NEGATIVE
NRBC BLD-RTO: 0 /100 WBCS (ref 0–0)
P AXIS: 44 DEGREES
P OFFSET: 187 MS
P ONSET: 125 MS
PH UR STRIP.AUTO: 6 [PH]
PLATELET # BLD AUTO: 488 X10*3/UL (ref 150–450)
POTASSIUM SERPL-SCNC: 4.4 MMOL/L (ref 3.5–5.3)
PR INTERVAL: 172 MS
PROT SERPL-MCNC: 7.3 G/DL (ref 6.4–8.2)
PROT UR STRIP.AUTO-MCNC: NORMAL MG/DL
Q ONSET: 211 MS
QRS COUNT: 13 BEATS
QRS DURATION: 86 MS
QT INTERVAL: 390 MS
QTC CALCULATION(BAZETT): 449 MS
QTC FREDERICIA: 429 MS
R AXIS: -62 DEGREES
RBC # BLD AUTO: 3.65 X10*6/UL (ref 4.5–5.9)
RBC # UR STRIP.AUTO: NEGATIVE /UL
RBC #/AREA URNS AUTO: ABNORMAL /HPF
RSV RNA RESP QL NAA+PROBE: NOT DETECTED
SARS-COV-2 RNA RESP QL NAA+PROBE: NOT DETECTED
SODIUM SERPL-SCNC: 136 MMOL/L (ref 136–145)
SP GR UR STRIP.AUTO: 1.02
T AXIS: 55 DEGREES
T OFFSET: 406 MS
UROBILINOGEN UR STRIP.AUTO-MCNC: NORMAL MG/DL
VENTRICULAR RATE: 80 BPM
WBC # BLD AUTO: 11 X10*3/UL (ref 4.4–11.3)
WBC #/AREA URNS AUTO: ABNORMAL /HPF

## 2025-01-27 PROCEDURE — 99285 EMERGENCY DEPT VISIT HI MDM: CPT | Mod: 25 | Performed by: EMERGENCY MEDICINE

## 2025-01-27 PROCEDURE — 71045 X-RAY EXAM CHEST 1 VIEW: CPT | Performed by: RADIOLOGY

## 2025-01-27 PROCEDURE — 36415 COLL VENOUS BLD VENIPUNCTURE: CPT | Performed by: EMERGENCY MEDICINE

## 2025-01-27 PROCEDURE — 87636 SARSCOV2 & INF A&B AMP PRB: CPT | Performed by: EMERGENCY MEDICINE

## 2025-01-27 PROCEDURE — 71045 X-RAY EXAM CHEST 1 VIEW: CPT

## 2025-01-27 PROCEDURE — 2500000004 HC RX 250 GENERAL PHARMACY W/ HCPCS (ALT 636 FOR OP/ED): Performed by: EMERGENCY MEDICINE

## 2025-01-27 PROCEDURE — 87040 BLOOD CULTURE FOR BACTERIA: CPT | Mod: ELYLAB | Performed by: EMERGENCY MEDICINE

## 2025-01-27 PROCEDURE — 82947 ASSAY GLUCOSE BLOOD QUANT: CPT

## 2025-01-27 PROCEDURE — 96365 THER/PROPH/DIAG IV INF INIT: CPT

## 2025-01-27 PROCEDURE — 70450 CT HEAD/BRAIN W/O DYE: CPT | Performed by: RADIOLOGY

## 2025-01-27 PROCEDURE — 1210000001 HC SEMI-PRIVATE ROOM DAILY

## 2025-01-27 PROCEDURE — 2500000002 HC RX 250 W HCPCS SELF ADMINISTERED DRUGS (ALT 637 FOR MEDICARE OP, ALT 636 FOR OP/ED): Performed by: STUDENT IN AN ORGANIZED HEALTH CARE EDUCATION/TRAINING PROGRAM

## 2025-01-27 PROCEDURE — 84075 ASSAY ALKALINE PHOSPHATASE: CPT | Performed by: EMERGENCY MEDICINE

## 2025-01-27 PROCEDURE — 94640 AIRWAY INHALATION TREATMENT: CPT

## 2025-01-27 PROCEDURE — 2500000004 HC RX 250 GENERAL PHARMACY W/ HCPCS (ALT 636 FOR OP/ED): Performed by: STUDENT IN AN ORGANIZED HEALTH CARE EDUCATION/TRAINING PROGRAM

## 2025-01-27 PROCEDURE — 96361 HYDRATE IV INFUSION ADD-ON: CPT

## 2025-01-27 PROCEDURE — 84484 ASSAY OF TROPONIN QUANT: CPT | Performed by: EMERGENCY MEDICINE

## 2025-01-27 PROCEDURE — 99223 1ST HOSP IP/OBS HIGH 75: CPT | Performed by: STUDENT IN AN ORGANIZED HEALTH CARE EDUCATION/TRAINING PROGRAM

## 2025-01-27 PROCEDURE — 81001 URINALYSIS AUTO W/SCOPE: CPT | Performed by: EMERGENCY MEDICINE

## 2025-01-27 PROCEDURE — 70450 CT HEAD/BRAIN W/O DYE: CPT

## 2025-01-27 PROCEDURE — 83605 ASSAY OF LACTIC ACID: CPT | Performed by: EMERGENCY MEDICINE

## 2025-01-27 PROCEDURE — 2500000005 HC RX 250 GENERAL PHARMACY W/O HCPCS: Performed by: PHYSICIAN ASSISTANT

## 2025-01-27 PROCEDURE — 85025 COMPLETE CBC W/AUTO DIFF WBC: CPT | Performed by: EMERGENCY MEDICINE

## 2025-01-27 PROCEDURE — 2500000001 HC RX 250 WO HCPCS SELF ADMINISTERED DRUGS (ALT 637 FOR MEDICARE OP): Performed by: STUDENT IN AN ORGANIZED HEALTH CARE EDUCATION/TRAINING PROGRAM

## 2025-01-27 PROCEDURE — P9612 CATHETERIZE FOR URINE SPEC: HCPCS

## 2025-01-27 PROCEDURE — 2500000005 HC RX 250 GENERAL PHARMACY W/O HCPCS: Performed by: STUDENT IN AN ORGANIZED HEALTH CARE EDUCATION/TRAINING PROGRAM

## 2025-01-27 PROCEDURE — 93005 ELECTROCARDIOGRAM TRACING: CPT

## 2025-01-27 RX ORDER — LEVOFLOXACIN 5 MG/ML
500 INJECTION, SOLUTION INTRAVENOUS ONCE
Status: COMPLETED | OUTPATIENT
Start: 2025-01-27 | End: 2025-01-27

## 2025-01-27 RX ORDER — CEFTRIAXONE 1 G/50ML
1 INJECTION, SOLUTION INTRAVENOUS EVERY 24 HOURS
Status: DISCONTINUED | OUTPATIENT
Start: 2025-01-28 | End: 2025-01-31

## 2025-01-27 RX ORDER — SENNOSIDES 8.6 MG/1
2 TABLET ORAL NIGHTLY
Status: DISCONTINUED | OUTPATIENT
Start: 2025-01-27 | End: 2025-02-07 | Stop reason: HOSPADM

## 2025-01-27 RX ORDER — ACETAMINOPHEN 160 MG/5ML
650 SOLUTION ORAL EVERY 4 HOURS PRN
Status: DISCONTINUED | OUTPATIENT
Start: 2025-01-27 | End: 2025-02-07 | Stop reason: HOSPADM

## 2025-01-27 RX ORDER — LEVOFLOXACIN 5 MG/ML
500 INJECTION, SOLUTION INTRAVENOUS ONCE
Status: DISCONTINUED | OUTPATIENT
Start: 2025-01-27 | End: 2025-01-27

## 2025-01-27 RX ORDER — ACETAMINOPHEN 650 MG/1
650 SUPPOSITORY RECTAL EVERY 4 HOURS PRN
Status: DISCONTINUED | OUTPATIENT
Start: 2025-01-27 | End: 2025-02-07 | Stop reason: HOSPADM

## 2025-01-27 RX ORDER — ACETAMINOPHEN 325 MG/1
650 TABLET ORAL EVERY 4 HOURS PRN
Status: DISCONTINUED | OUTPATIENT
Start: 2025-01-27 | End: 2025-02-07 | Stop reason: HOSPADM

## 2025-01-27 RX ORDER — ACETAMINOPHEN 500 MG
5 TABLET ORAL NIGHTLY PRN
Status: DISCONTINUED | OUTPATIENT
Start: 2025-01-27 | End: 2025-02-07 | Stop reason: HOSPADM

## 2025-01-27 RX ORDER — TRAMADOL HYDROCHLORIDE 50 MG/1
50 TABLET ORAL EVERY 8 HOURS PRN
Status: DISCONTINUED | OUTPATIENT
Start: 2025-01-27 | End: 2025-02-04

## 2025-01-27 RX ORDER — AZITHROMYCIN 250 MG/1
500 TABLET, FILM COATED ORAL
Status: COMPLETED | OUTPATIENT
Start: 2025-01-28 | End: 2025-01-30

## 2025-01-27 RX ORDER — POLYETHYLENE GLYCOL 3350 17 G/17G
17 POWDER, FOR SOLUTION ORAL DAILY
Status: DISCONTINUED | OUTPATIENT
Start: 2025-01-27 | End: 2025-02-07 | Stop reason: HOSPADM

## 2025-01-27 RX ORDER — GUAIFENESIN 600 MG/1
600 TABLET, EXTENDED RELEASE ORAL 2 TIMES DAILY
Status: DISCONTINUED | OUTPATIENT
Start: 2025-01-27 | End: 2025-02-07 | Stop reason: HOSPADM

## 2025-01-27 RX ORDER — IPRATROPIUM BROMIDE AND ALBUTEROL SULFATE 2.5; .5 MG/3ML; MG/3ML
3 SOLUTION RESPIRATORY (INHALATION)
Status: DISCONTINUED | OUTPATIENT
Start: 2025-01-27 | End: 2025-02-01

## 2025-01-27 RX ADMIN — GUAIFENESIN 600 MG: 600 TABLET, EXTENDED RELEASE ORAL at 20:11

## 2025-01-27 RX ADMIN — Medication 2 L/MIN: at 16:59

## 2025-01-27 RX ADMIN — SODIUM CHLORIDE 1000 ML: 9 INJECTION, SOLUTION INTRAVENOUS at 15:13

## 2025-01-27 RX ADMIN — Medication 5 MG: at 20:11

## 2025-01-27 RX ADMIN — APIXABAN 5 MG: 5 TABLET, FILM COATED ORAL at 20:11

## 2025-01-27 RX ADMIN — STANDARDIZED SENNA CONCENTRATE 17.2 MG: 8.6 TABLET ORAL at 20:11

## 2025-01-27 RX ADMIN — LEVOFLOXACIN 500 MG: 250 INJECTION, SOLUTION INTRAVENOUS at 16:06

## 2025-01-27 RX ADMIN — IPRATROPIUM BROMIDE AND ALBUTEROL SULFATE 3 ML: 2.5; .5 SOLUTION RESPIRATORY (INHALATION) at 21:03

## 2025-01-27 RX ADMIN — Medication 2 L/MIN: at 20:15

## 2025-01-27 SDOH — ECONOMIC STABILITY: FOOD INSECURITY: WITHIN THE PAST 12 MONTHS, THE FOOD YOU BOUGHT JUST DIDN'T LAST AND YOU DIDN'T HAVE MONEY TO GET MORE.: NEVER TRUE

## 2025-01-27 SDOH — SOCIAL STABILITY: SOCIAL INSECURITY: ARE YOU OR HAVE YOU BEEN THREATENED OR ABUSED PHYSICALLY, EMOTIONALLY, OR SEXUALLY BY ANYONE?: NO

## 2025-01-27 SDOH — HEALTH STABILITY: MENTAL HEALTH
DO YOU FEEL STRESS - TENSE, RESTLESS, NERVOUS, OR ANXIOUS, OR UNABLE TO SLEEP AT NIGHT BECAUSE YOUR MIND IS TROUBLED ALL THE TIME - THESE DAYS?: NOT AT ALL

## 2025-01-27 SDOH — SOCIAL STABILITY: SOCIAL INSECURITY: WITHIN THE LAST YEAR, HAVE YOU BEEN HUMILIATED OR EMOTIONALLY ABUSED IN OTHER WAYS BY YOUR PARTNER OR EX-PARTNER?: NO

## 2025-01-27 SDOH — SOCIAL STABILITY: SOCIAL INSECURITY
WITHIN THE LAST YEAR, HAVE YOU BEEN KICKED, HIT, SLAPPED, OR OTHERWISE PHYSICALLY HURT BY YOUR PARTNER OR EX-PARTNER?: NO

## 2025-01-27 SDOH — HEALTH STABILITY: PHYSICAL HEALTH: ON AVERAGE, HOW MANY MINUTES DO YOU ENGAGE IN EXERCISE AT THIS LEVEL?: 0 MIN

## 2025-01-27 SDOH — SOCIAL STABILITY: SOCIAL INSECURITY
WITHIN THE LAST YEAR, HAVE YOU BEEN RAPED OR FORCED TO HAVE ANY KIND OF SEXUAL ACTIVITY BY YOUR PARTNER OR EX-PARTNER?: NO

## 2025-01-27 SDOH — ECONOMIC STABILITY: FOOD INSECURITY: HOW HARD IS IT FOR YOU TO PAY FOR THE VERY BASICS LIKE FOOD, HOUSING, MEDICAL CARE, AND HEATING?: NOT HARD AT ALL

## 2025-01-27 SDOH — SOCIAL STABILITY: SOCIAL INSECURITY: DO YOU FEEL ANYONE HAS EXPLOITED OR TAKEN ADVANTAGE OF YOU FINANCIALLY OR OF YOUR PERSONAL PROPERTY?: NO

## 2025-01-27 SDOH — ECONOMIC STABILITY: INCOME INSECURITY: IN THE PAST 12 MONTHS HAS THE ELECTRIC, GAS, OIL, OR WATER COMPANY THREATENED TO SHUT OFF SERVICES IN YOUR HOME?: NO

## 2025-01-27 SDOH — SOCIAL STABILITY: SOCIAL INSECURITY: ARE THERE ANY APPARENT SIGNS OF INJURIES/BEHAVIORS THAT COULD BE RELATED TO ABUSE/NEGLECT?: NO

## 2025-01-27 SDOH — SOCIAL STABILITY: SOCIAL NETWORK: HOW OFTEN DO YOU GET TOGETHER WITH FRIENDS OR RELATIVES?: THREE TIMES A WEEK

## 2025-01-27 SDOH — ECONOMIC STABILITY: HOUSING INSECURITY: IN THE PAST 12 MONTHS, HOW MANY TIMES HAVE YOU MOVED WHERE YOU WERE LIVING?: 1

## 2025-01-27 SDOH — HEALTH STABILITY: PHYSICAL HEALTH
HOW OFTEN DO YOU NEED TO HAVE SOMEONE HELP YOU WHEN YOU READ INSTRUCTIONS, PAMPHLETS, OR OTHER WRITTEN MATERIAL FROM YOUR DOCTOR OR PHARMACY?: OFTEN

## 2025-01-27 SDOH — ECONOMIC STABILITY: FOOD INSECURITY: WITHIN THE PAST 12 MONTHS, YOU WORRIED THAT YOUR FOOD WOULD RUN OUT BEFORE YOU GOT THE MONEY TO BUY MORE.: NEVER TRUE

## 2025-01-27 SDOH — ECONOMIC STABILITY: HOUSING INSECURITY: IN THE LAST 12 MONTHS, WAS THERE A TIME WHEN YOU WERE NOT ABLE TO PAY THE MORTGAGE OR RENT ON TIME?: NO

## 2025-01-27 SDOH — SOCIAL STABILITY: SOCIAL NETWORK
DO YOU BELONG TO ANY CLUBS OR ORGANIZATIONS SUCH AS CHURCH GROUPS, UNIONS, FRATERNAL OR ATHLETIC GROUPS, OR SCHOOL GROUPS?: NO

## 2025-01-27 SDOH — SOCIAL STABILITY: SOCIAL INSECURITY: WITHIN THE LAST YEAR, HAVE YOU BEEN AFRAID OF YOUR PARTNER OR EX-PARTNER?: NO

## 2025-01-27 SDOH — ECONOMIC STABILITY: HOUSING INSECURITY: AT ANY TIME IN THE PAST 12 MONTHS, WERE YOU HOMELESS OR LIVING IN A SHELTER (INCLUDING NOW)?: NO

## 2025-01-27 SDOH — SOCIAL STABILITY: SOCIAL NETWORK: IN A TYPICAL WEEK, HOW MANY TIMES DO YOU TALK ON THE PHONE WITH FAMILY, FRIENDS, OR NEIGHBORS?: THREE TIMES A WEEK

## 2025-01-27 SDOH — HEALTH STABILITY: PHYSICAL HEALTH: ON AVERAGE, HOW MANY DAYS PER WEEK DO YOU ENGAGE IN MODERATE TO STRENUOUS EXERCISE (LIKE A BRISK WALK)?: 0 DAYS

## 2025-01-27 SDOH — SOCIAL STABILITY: SOCIAL INSECURITY: ARE YOU MARRIED, WIDOWED, DIVORCED, SEPARATED, NEVER MARRIED, OR LIVING WITH A PARTNER?: WIDOWED

## 2025-01-27 SDOH — SOCIAL STABILITY: SOCIAL INSECURITY: HAS ANYONE EVER THREATENED TO HURT YOUR FAMILY OR YOUR PETS?: NO

## 2025-01-27 SDOH — SOCIAL STABILITY: SOCIAL INSECURITY: DO YOU FEEL UNSAFE GOING BACK TO THE PLACE WHERE YOU ARE LIVING?: NO

## 2025-01-27 SDOH — ECONOMIC STABILITY: TRANSPORTATION INSECURITY: IN THE PAST 12 MONTHS, HAS LACK OF TRANSPORTATION KEPT YOU FROM MEDICAL APPOINTMENTS OR FROM GETTING MEDICATIONS?: NO

## 2025-01-27 SDOH — SOCIAL STABILITY: SOCIAL NETWORK: HOW OFTEN DO YOU ATTEND MEETINGS OF THE CLUBS OR ORGANIZATIONS YOU BELONG TO?: NEVER

## 2025-01-27 SDOH — SOCIAL STABILITY: SOCIAL INSECURITY: ABUSE: ADULT

## 2025-01-27 SDOH — SOCIAL STABILITY: SOCIAL INSECURITY: WERE YOU ABLE TO COMPLETE ALL THE BEHAVIORAL HEALTH SCREENINGS?: YES

## 2025-01-27 SDOH — SOCIAL STABILITY: SOCIAL INSECURITY: HAVE YOU HAD THOUGHTS OF HARMING ANYONE ELSE?: NO

## 2025-01-27 SDOH — SOCIAL STABILITY: SOCIAL NETWORK: HOW OFTEN DO YOU ATTEND CHURCH OR RELIGIOUS SERVICES?: NEVER

## 2025-01-27 SDOH — SOCIAL STABILITY: SOCIAL INSECURITY: HAVE YOU HAD ANY THOUGHTS OF HARMING ANYONE ELSE?: NO

## 2025-01-27 SDOH — SOCIAL STABILITY: SOCIAL INSECURITY: DOES ANYONE TRY TO KEEP YOU FROM HAVING/CONTACTING OTHER FRIENDS OR DOING THINGS OUTSIDE YOUR HOME?: NO

## 2025-01-27 ASSESSMENT — COLUMBIA-SUICIDE SEVERITY RATING SCALE - C-SSRS
1. IN THE PAST MONTH, HAVE YOU WISHED YOU WERE DEAD OR WISHED YOU COULD GO TO SLEEP AND NOT WAKE UP?: NO
6. HAVE YOU EVER DONE ANYTHING, STARTED TO DO ANYTHING, OR PREPARED TO DO ANYTHING TO END YOUR LIFE?: NO
2. HAVE YOU ACTUALLY HAD ANY THOUGHTS OF KILLING YOURSELF?: NO

## 2025-01-27 ASSESSMENT — PAIN DESCRIPTION - LOCATION: LOCATION: GENERALIZED

## 2025-01-27 ASSESSMENT — LIFESTYLE VARIABLES
AUDIT-C TOTAL SCORE: 0
EVER FELT BAD OR GUILTY ABOUT YOUR DRINKING: NO
HOW MANY STANDARD DRINKS CONTAINING ALCOHOL DO YOU HAVE ON A TYPICAL DAY: PATIENT DOES NOT DRINK
HAVE YOU EVER FELT YOU SHOULD CUT DOWN ON YOUR DRINKING: NO
EVER HAD A DRINK FIRST THING IN THE MORNING TO STEADY YOUR NERVES TO GET RID OF A HANGOVER: NO
PRESCIPTION_ABUSE_PAST_12_MONTHS: NO
SKIP TO QUESTIONS 9-10: 1
HAVE PEOPLE ANNOYED YOU BY CRITICIZING YOUR DRINKING: NO
HOW OFTEN DO YOU HAVE A DRINK CONTAINING ALCOHOL: NEVER
TOTAL SCORE: 0
SUBSTANCE_ABUSE_PAST_12_MONTHS: NO
AUDIT-C TOTAL SCORE: 0
HOW OFTEN DO YOU HAVE 6 OR MORE DRINKS ON ONE OCCASION: NEVER

## 2025-01-27 ASSESSMENT — PAIN DESCRIPTION - PAIN TYPE: TYPE: ACUTE PAIN

## 2025-01-27 ASSESSMENT — COGNITIVE AND FUNCTIONAL STATUS - GENERAL
DAILY ACTIVITIY SCORE: 19
TOILETING: A LITTLE
PATIENT BASELINE BEDBOUND: NO
STANDING UP FROM CHAIR USING ARMS: A LITTLE
MOVING TO AND FROM BED TO CHAIR: A LITTLE
WALKING IN HOSPITAL ROOM: A LITTLE
HELP NEEDED FOR BATHING: A LITTLE
DRESSING REGULAR LOWER BODY CLOTHING: A LITTLE
MOBILITY SCORE: 19
PERSONAL GROOMING: A LITTLE
CLIMB 3 TO 5 STEPS WITH RAILING: A LITTLE
STANDING UP FROM CHAIR USING ARMS: A LITTLE
DRESSING REGULAR UPPER BODY CLOTHING: A LITTLE
CLIMB 3 TO 5 STEPS WITH RAILING: A LITTLE
DRESSING REGULAR LOWER BODY CLOTHING: A LITTLE
TOILETING: A LITTLE
HELP NEEDED FOR BATHING: A LITTLE
DRESSING REGULAR UPPER BODY CLOTHING: A LITTLE
MOVING TO AND FROM BED TO CHAIR: A LITTLE
MOVING FROM LYING ON BACK TO SITTING ON SIDE OF FLAT BED WITH BEDRAILS: A LITTLE
DAILY ACTIVITIY SCORE: 20
WALKING IN HOSPITAL ROOM: A LITTLE
MOBILITY SCORE: 20

## 2025-01-27 ASSESSMENT — ACTIVITIES OF DAILY LIVING (ADL)
TOILETING: NEEDS ASSISTANCE
JUDGMENT_ADEQUATE_SAFELY_COMPLETE_DAILY_ACTIVITIES: YES
HEARING - LEFT EAR: HEARING AID
WALKS IN HOME: NEEDS ASSISTANCE
LACK_OF_TRANSPORTATION: NO
BATHING: NEEDS ASSISTANCE
FEEDING YOURSELF: INDEPENDENT
LACK_OF_TRANSPORTATION: NO
DRESSING YOURSELF: INDEPENDENT
GROOMING: INDEPENDENT
HEARING - RIGHT EAR: HEARING AID
ADEQUATE_TO_COMPLETE_ADL: YES
PATIENT'S MEMORY ADEQUATE TO SAFELY COMPLETE DAILY ACTIVITIES?: YES

## 2025-01-27 ASSESSMENT — PATIENT HEALTH QUESTIONNAIRE - PHQ9
SUM OF ALL RESPONSES TO PHQ9 QUESTIONS 1 & 2: 0
2. FEELING DOWN, DEPRESSED OR HOPELESS: NOT AT ALL
1. LITTLE INTEREST OR PLEASURE IN DOING THINGS: NOT AT ALL

## 2025-01-27 ASSESSMENT — PAIN - FUNCTIONAL ASSESSMENT: PAIN_FUNCTIONAL_ASSESSMENT: 0-10

## 2025-01-27 ASSESSMENT — PAIN SCALES - GENERAL
PAINLEVEL_OUTOF10: 0 - NO PAIN
PAINLEVEL_OUTOF10: 1

## 2025-01-27 NOTE — H&P
Medical Group History and Physical  ASSESSMENT & PLAN:       Likely Bacterial Pneumonia   -Patient presenting with SOB & cough x3 days.   -Bibasilar infiltrates noted on CXR concerning for pneumoina    Plan:  -Ceftriaxone/azithromycin initiated. Received a dose of levaquin in ED  -Guaifenisin BID  -Scheduled nebs  -Incentive spirometry encouraged  -PT/OT eval appreciated    Hx PE  -Cont eliquis for now  -May have been provoked by COVID infection in the past, could consider discontinuation prior to DC/further investigation.     Hx BPH? HLD?  -Atorvastatin & tamsulosin listed as prior meds but med rec not verified. Pt denied taking other medications aside from eliquis at home.   -Will resume as applicable pending verification.     VTE Prophylaxis: On Eliquis      ---Of note, this documentation is completed using the Dragon Dictation system (voice recognition software). There may be spelling and/or grammatical errors that were not corrected prior to final submission.---    Joey Mora MD    HISTORY OF PRESENT ILLNESS:   Chief Complaint: Shortness of breath, confusion    History Of Present Illness:    Chrsi Russell is a 89 y.o. male with a significant past medical history of PE on Eliquis who presented to hospital due to concerns of difficulty breathing as well as confusion.  Patient reports feeling unwell for the past 3 days with worsening shortness of breath over this time period.  Endorses associated cough and sputum production.  Also endorses some lightheadedness in this time period.  Daughter at bedside reported some confusion earlier this morning with patient not being as sharp as his usual self which concerned her and prompted her to bring her to the hospital for evaluation.  Patient's mentation appears to have improved following initial therapy.  He was oriented to self, location, year month and day.  Patient denied any abdominal pain constipation or diarrhea.  Denied any runny nose or sore throat.   "Further ROS was unremarkable.     Review of systems: 10 point review of systems is otherwise negative except as mentioned above.    PAST HISTORIES:     Past Medical History:  He has no past medical history on file.    Past Surgical History:  He has a past surgical history that includes Cholecystectomy and Abdominal aortic aneurysm repair.      Social History:  He reports that he has never smoked. He has never been exposed to tobacco smoke. He has never used smokeless tobacco. He reports that he does not drink alcohol and does not use drugs.    Family History:  Family History   Problem Relation Name Age of Onset    No Known Problems Mother      No Known Problems Father          Allergies:  Patient has no known allergies.    OBJECTIVE:     Last Recorded Vitals:  Vitals:    01/27/25 1401 01/27/25 1600 01/27/25 1701   BP: 155/69 136/67 162/86   Pulse: 86 80 76   Resp: 14 14 16   Temp:   36.6 °C (97.9 °F)   TempSrc: Temporal  Temporal   SpO2: (!) 91% (!) 92% (!) 92%   Weight: 99.8 kg (220 lb)     Height: 1.88 m (6' 2\")       Last I/O:  No intake/output data recorded.    Physical Exam  General: Well-developed elderly male in mild distress, hard of hearing  HEENT: Clear sclera, EOMI, trachea midline, moist mucous membranes, hearing aid in place  Respiratory: Equal chest rise, no retractions, rhonchi bilaterally most prominent at the bases.  Egophony positive  Cardiovascular: S1 and S2 auscultated, no murmurs clicks or rubs  Abdomen: Soft, nontender, nondistended  Extremities: No cyanosis or clubbing appreciated  Neurological: Spontaneously moves all extremities, no dysarthria, cranial nerves grossly intact  Psychiatric: Appropriate mood and affect  Skin: Warm, dry    Scheduled Medications  oxygen, , inhalation, Continuous - Inhalation      PRN Medications    Continuous Medications       Outpatient Medications:  Prior to Admission medications    Medication Sig Start Date End Date Taking? Authorizing Provider   albuterol " (Ventolin HFA) 90 mcg/actuation inhaler Inhale 2 puffs every 4 hours if needed for wheezing or shortness of breath. 11/12/24 11/12/25  Pierce Chambers MD   apixaban (Eliquis DVT-PE Treat 30D Start) 5 mg (74 tabs) tablets,dose pack Take 2 tablets (10 mg) by mouth every 12 hours.    Historical Provider, MD   atorvastatin (Lipitor) 40 mg tablet TAKE 1 TABLET BY MOUTH EVERY DAY 4/29/24   Pierce Chambers MD   benzocaine-menthoL (Cepacol Sore Throat, lyndsey-men,) 15-2.3 mg lozenge Place into mouth between cheek and gum.    Historical Provider, MD   Eliquis 5 mg tablet Take 1 tablet (5 mg) by mouth 2 times a day. 11/22/24   Pierce Chambers MD   multivitamin capsule Take 1 capsule by mouth once daily.    Historical Provider, MD   tamsulosin (Flomax) 0.4 mg 24 hr capsule TAKE 2 CAPSULES BY MOUTH ONCE DAILY 8/8/24   Pierce Chambers MD   traZODone (Desyrel) 100 mg tablet Take 1 tablet (100 mg) by mouth as needed at bedtime for sleep. 8/13/24 8/13/25  Pierce Chambers MD   traZODone (Desyrel) 50 mg tablet TAKE 1 TABLET (50 MG) BY MOUTH ONCE DAILY AT BEDTIME. 1/6/25 1/6/26  Pierce Chambers MD       LABS AND IMAGING:     Labs:  Results from last 7 days   Lab Units 01/27/25  1436   WBC AUTO x10*3/uL 11.0   RBC AUTO x10*6/uL 3.65*   HEMOGLOBIN g/dL 9.4*   HEMATOCRIT % 29.4*   MCV fL 81   MCH pg 25.8*   MCHC g/dL 32.0   RDW % 16.5*   PLATELETS AUTO x10*3/uL 488*     Results from last 7 days   Lab Units 01/27/25  1436   SODIUM mmol/L 136   POTASSIUM mmol/L 4.4   CHLORIDE mmol/L 98   CO2 mmol/L 27   BUN mg/dL 32*   CREATININE mg/dL 1.53*   GLUCOSE mg/dL 164*   PROTEIN TOTAL g/dL 7.3   CALCIUM mg/dL 9.2   BILIRUBIN TOTAL mg/dL 0.4   ALK PHOS U/L 139*   AST U/L 71*   ALT U/L 124*         Results from last 7 days   Lab Units 01/27/25  1601 01/27/25  1436   TROPHS ng/L 19 23*       Imaging:  ECG 12 Lead  Sinus rhythm with Blocked Premature atrial complexes with occasional Premature ventricular complexes  Low voltage  QRS  Left anterior fascicular block  Possible Anterolateral infarct (cited on or before 20-JUN-2023)  Abnormal ECG  When compared with ECG of 20-JUN-2023 02:35,  Premature ventricular complexes are now Present  Premature atrial complexes are now Present  Left anterior fascicular block is now Present  Questionable change in initial forces of Anterolateral leads  XR chest 1 view  Narrative: Interpreted By:  Darrion Crowley,   STUDY:  XR CHEST 1 VIEW  1/27/2025 2:25 pm      INDICATION:  Signs/Symptoms:AMS      COMPARISON:  06/04/2024      ACCESSION NUMBER(S):  [id number]      ORDERING CLINICIAN:  PAL THORNTON      TECHNIQUE:  A single AP portable radiograph of the chest was obtained.      FINDINGS:  Multiple cardiac monitoring leads are seen over the chest.  Bibasilar  airspace consolidations are seen and may represent small pleural  effusions, atelectasis and/or pneumonia. No pneumothorax is  identified. The cardiac silhouette is within normal limits for size.      Impression: Bibasilar airspace opacities, as above. Clinical correlation and  continued follow-up until clearing is recommended.      MACRO:  None.      Signed by: Darrion Crowley 1/27/2025 2:37 PM  Dictation workstation:   [id number]  CT head wo IV contrast  Narrative: Interpreted By:  Darrion Crowley,   STUDY:  CT HEAD WO IV CONTRAST; 1/27/2025 2:22 pm      INDICATION:  Signs/Symptoms:AMS.      COMPARISON:  None.      ACCESSION NUMBER(S):  [id number]      ORDERING CLINICIAN:  PAL THORNTON      TECHNIQUE:  Contiguous axial CT images were obtained through the head at 5 mm  slice thickness without contrast administration.      FINDINGS:  INTRACRANIAL:  The ventricles, sulci and basal cisterns are within normal limits for  size and configuration. The grey-white differentiation is intact.  There is no mass effect or midline shift. There is no extraaxial  fluid collection. There is no intracranial hemorrhage.  The calvarium  is unremarkable.       EXTRACRANIAL:  Visualized paranasal sinuses and mastoids are clear.      Impression: No evidence of acute cortical infarct or intracranial hemorrhage.      MACRO:  None          Signed by: Darrion Crowley 1/27/2025 2:36 PM  Dictation workstation:   [id number]

## 2025-01-27 NOTE — ED PROVIDER NOTES
HPI   Chief Complaint   Patient presents with    Altered Mental Status     Pt was at home and daughter states about 3-4 hours ago he became disoriented and not making sense with his words. Daughter states he was having a hard time getting words out. Pt daughter states this episode lasted around 5 minutes and he went back to normal. Pt daughter also states that he has had a cough for 3 days which is affecting his breathing.         a 89-year-old male patient with history of hyperlipidemia is brought in the emergency department today by daughter.  Patient states over the last 2 to 3 days he has had a significant cough that has been productive.  States with the cough he had chills and has felt fatigued.  States he gets some headaches every now and again.  Earlier today the daughter states that he seemed really out of it.  Mentally was not sharp.  States that has recovered some since she initially noticed it is around 11:30 AM.  He has improved in that regard.  He otherwise no other complaints at the present time for this purpose  he was brought to the emergency department today for further evaluation.              Patient History   History reviewed. No pertinent past medical history.  Past Surgical History:   Procedure Laterality Date    ABDOMINAL AORTIC ANEURYSM REPAIR      CHOLECYSTECTOMY       Family History   Problem Relation Name Age of Onset    No Known Problems Mother      No Known Problems Father       Social History     Tobacco Use    Smoking status: Never     Passive exposure: Never    Smokeless tobacco: Never   Substance Use Topics    Alcohol use: Never    Drug use: Never       Physical Exam   ED Triage Vitals [01/27/25 1401]   Temp Heart Rate Respirations BP   -- 86 14 155/69      Pulse Ox Temp Source Heart Rate Source Patient Position   (!) 91 % Temporal Monitor --      BP Location FiO2 (%)     -- --       Physical Exam  Constitutional:       General: He is not in acute distress.     Appearance: Normal  appearance. He is ill-appearing. He is not diaphoretic.   HENT:      Head: Normocephalic and atraumatic.      Nose: Nose normal.   Eyes:      Extraocular Movements: Extraocular movements intact.      Conjunctiva/sclera: Conjunctivae normal.      Pupils: Pupils are equal, round, and reactive to light.   Cardiovascular:      Rate and Rhythm: Normal rate and regular rhythm.   Pulmonary:      Effort: Pulmonary effort is normal. No respiratory distress.      Breath sounds: No stridor. No wheezing.      Comments:  crackles bilateral  Musculoskeletal:         General: Normal range of motion.      Cervical back: Normal range of motion.   Skin:     General: Skin is warm and dry.   Neurological:      General: No focal deficit present.      Mental Status: He is alert and oriented to person, place, and time. Mental status is at baseline.      Cranial Nerves: No cranial nerve deficit, dysarthria or facial asymmetry.      Sensory: No sensory deficit.      Motor: No weakness.      Comments:  NIH: 0   Psychiatric:         Mood and Affect: Mood normal. Mood is not anxious.         Speech: Speech normal.           ED Course & MDM   Diagnoses as of 01/27/25 1707   Community acquired pneumonia, unspecified laterality   Generalized weakness                 No data recorded     [place] Coma Scale Score: 15 (01/27/25 1404 : Azalea Scott, PEDRITO)                           Medical Decision Making   a 89-year-old male patient with history of hyperlipidemia is brought in the emergency department today by daughter.  Patient states over the last 2 to 3 days he has had a significant cough that has been productive.  States with the cough he had chills and has felt fatigued.  States he gets some headaches every now and again.  Earlier today the daughter states that he seemed really out of it.  Mentally was not sharp.  States that has recovered some since she initially noticed it is around 11:30 AM.  He has improved in that regard.  He otherwise no  other complaints at the present time for this purpose  he was brought to the emergency department today for further evaluation.      EKG, chest x-ray, laboratory studies ordered to rule out ACS, arrhythmia, electrolyte O'V[place], leukocytosis, acute kidney injury, pneumonia, pneumothorax, pulmonary congestion, pleural effusions.  CT of the head ordered to rule out a brain mass, brain edema, brain hemorrhage. urinalysis ordered to rule out urinary tract infectionAs well as testing for COVID-19, influenza and RSV.    Patient's chest x-ray is consistent with bibasilar pneumonia , IV Levaquin ordered for the patient.     Patient has negative urinalysis negative for COVID-19, influenza, RSV initial troponin 20 3 repeat 19.  Patient's creatinine 1.53 GFR 42 which is consistent with past laboratory study.  Patient's lactate negative no leukocytosis but an absolute neutrophil count of 8.37.  Patient's chest x-ray discussed with bibasilar airspace opacities.    I discussed the case with Dr. Mora who agrees admit the patient under his service    Historian is the patient    Diagnosis: Community-acquired pneumonia, generalized weakness      Labs Reviewed   CBC WITH AUTO DIFFERENTIAL - Abnormal       Result Value    WBC 11.0      nRBC 0.0      RBC 3.65 (*)     Hemoglobin 9.4 (*)     Hematocrit 29.4 (*)     MCV 81      MCH 25.8 (*)     MCHC 32.0      RDW 16.5 (*)     Platelets 488 (*)     Neutrophils % 75.8      Immature Granulocytes %, Automated 0.5      Lymphocytes % 15.7      Monocytes % 7.3      Eosinophils % 0.5      Basophils % 0.2      Neutrophils Absolute 8.37 (*)     Immature Granulocytes Absolute, Automated 0.05      Lymphocytes Absolute 1.73      Monocytes Absolute 0.80      Eosinophils Absolute 0.05      Basophils Absolute 0.02     COMPREHENSIVE METABOLIC PANEL - Abnormal    Glucose 164 (*)     Sodium 136      Potassium 4.4      Chloride 98      Bicarbonate 27      Anion Gap 15      Urea Nitrogen 32 (*)     Creatinine  1.53 (*)     eGFR 42 (*)     Calcium 9.2      Albumin 3.8      Alkaline Phosphatase 139 (*)     Total Protein 7.3      AST 71 (*)     Bilirubin, Total 0.4       (*)    SERIAL TROPONIN-INITIAL - Abnormal    Troponin I, High Sensitivity 23 (*)     Narrative:     Less than 99th percentile of normal range cutoff-  Female and children under 18 years old <14 ng/L; Male <21 ng/L: Negative  Repeat testing should be performed if clinically indicated.     Female and children under 18 years old 14-50 ng/L; Male 21-50 ng/L:  Consistent with possible cardiac damage and possible increased clinical   risk. Serial measurements may help to assess extent of myocardial damage.     >50 ng/L: Consistent with cardiac damage, increased clinical risk and  myocardial infarction. Serial measurements may help assess extent of   myocardial damage.      NOTE: Children less than 1 year old may have higher baseline troponin   levels and results should be interpreted in conjunction with the overall   clinical context.     NOTE: Troponin I testing is performed using a different   testing methodology at [facility] than at other   [facility]. Direct result comparisons should only   be made within the same method.   POCT GLUCOSE - Abnormal    POCT Glucose 164 (*)    URINALYSIS MICROSCOPIC WITH REFLEX CULTURE - Abnormal    WBC, Urine NONE      RBC, Urine 1-2      Bacteria, Urine 1+ (*)     Mucus, Urine FEW     LACTATE - Normal    Lactate 1.9      Narrative:     Venipuncture immediately after or during the administration of Metamizole may lead to falsely low results. Testing should be performed immediately prior to Metamizole dosing.   URINALYSIS WITH REFLEX CULTURE AND MICROSCOPIC - Normal    Color, Urine Yellow      Appearance, Urine Clear      Specific Gravity, Urine 1.019      pH, Urine 6.0      Protein, Urine 20 (TRACE)      Glucose, Urine Normal      Blood, Urine NEGATIVE      Ketones, Urine NEGATIVE      Bilirubin,  Urine NEGATIVE      Urobilinogen, Urine Normal      Nitrite, Urine NEGATIVE      Leukocyte Esterase, Urine NEGATIVE     SERIAL TROPONIN, 1 HOUR - Normal    Troponin I, High Sensitivity 19      Narrative:     Less than 99th percentile of normal range cutoff-  Female and children under 18 years old <14 ng/L; Male <21 ng/L: Negative  Repeat testing should be performed if clinically indicated.     Female and children under 18 years old 14-50 ng/L; Male 21-50 ng/L:  Consistent with possible cardiac damage and possible increased clinical   risk. Serial measurements may help to assess extent of myocardial damage.     >50 ng/L: Consistent with cardiac damage, increased clinical risk and  myocardial infarction. Serial measurements may help assess extent of   myocardial damage.      NOTE: Children less than 1 year old may have higher baseline troponin   levels and results should be interpreted in conjunction with the overall   clinical context.     NOTE: Troponin I testing is performed using a different   testing methodology at [facility] than at other   [facility]. Direct result comparisons should only   be made within the same method.   SARS-COV-2 AND INFLUENZA A/B PCR - Normal    Flu A Result Not Detected      Flu B Result Not Detected      Coronavirus 2019, PCR Not Detected      Narrative:     This assay is an FDA-cleared, in vitro diagnostic nucleic acid amplification test for the qualitative detection and differentiation of SARS CoV-2/ Influenza A/B from nasopharyngeal specimens collected from individuals with signs and symptoms of respiratory tract infections, and has been validated for use at [facility]. Negative results do not preclude COVID-19/ Influenza A/B infections and should not be used as the sole basis for diagnosis, treatment, or other management decisions. Testing for SARS CoV-2 is recommended only for patients who meet current clinical and/or epidemiological  criteria defined by federal, state, or local public health directives.   RSV PCR - Normal    RSV PCR Not Detected      Narrative:     This assay is an FDA-cleared, in vitro diagnostic nucleic acid amplification test for the detection of RSV from nasopharyngeal specimens, and has been validated for use at [facility]. Negative results do not preclude RSV infections, and should not be used as the sole basis for diagnosis, treatment, or other management decisions. If Influenza A/B and RSV PCR results are negative, testing for Parainfluenza virus, Adenovirus and Metapneumovirus is routinely performed for pediatric oncology and intensive care inpatients at [facility], and is available on other patients by placing an add-on request.       BLOOD CULTURE   BLOOD CULTURE   TROPONIN SERIES- (INITIAL, 1 HR)    Narrative:     The following orders were created for panel order Troponin I Series, High Sensitivity (0, 1 HR).  Procedure                               Abnormality         Status                     ---------                               -----------         ------                     Troponin I, High Sensiti...[[id number]]  Abnormal            Final result               Troponin, High Sensitivi...[[id number]]  Normal              Final result                 Please view results for these tests on the individual orders.   URINALYSIS WITH REFLEX CULTURE AND MICROSCOPIC    Narrative:     The following orders were created for panel order Urinalysis with Reflex Culture and Microscopic.  Procedure                               Abnormality         Status                     ---------                               -----------         ------                     Urinalysis with Reflex C...[[id number]]  Normal              Final result               Extra Urine Gray Tube[[id number]]                            In process                   Please view results for these tests on the individual orders.   EXTRA URINE GRAY  TUBE   POCT GLUCOSE METER        XR chest 1 view   Final Result   Bibasilar airspace opacities, as above. Clinical correlation and   continued follow-up until clearing is recommended.        MACRO:   None.        Signed by: Darrion Crowley 1/27/2025 2:37 PM   Dictation workstation:   [id number]      CT head wo IV contrast   Final Result   No evidence of acute cortical infarct or intracranial hemorrhage.        MACRO:   None             Signed by: Darrion Crowley 1/27/2025 2:36 PM   Dictation workstation:   [id number]              Shared JW Attestation:    I personally saw the patient and made/approved the management plan and take responsibility for the patient management.     History: 89-year-old male presents with cough and generalized weakness.    Exam: Regular rate and rhythm cardiac exam with diminished breath sounds bilaterally.  Abdomen is soft and nontender.  Neurological exam is grossly intact.    MDM: Pneumonia, viral syndrome    Labs Reviewed   CBC WITH AUTO DIFFERENTIAL - Abnormal       Result Value    WBC 11.0      nRBC 0.0      RBC 3.65 (*)     Hemoglobin 9.4 (*)     Hematocrit 29.4 (*)     MCV 81      MCH 25.8 (*)     MCHC 32.0      RDW 16.5 (*)     Platelets 488 (*)     Neutrophils % 75.8      Immature Granulocytes %, Automated 0.5      Lymphocytes % 15.7      Monocytes % 7.3      Eosinophils % 0.5      Basophils % 0.2      Neutrophils Absolute 8.37 (*)     Immature Granulocytes Absolute, Automated 0.05      Lymphocytes Absolute 1.73      Monocytes Absolute 0.80      Eosinophils Absolute 0.05      Basophils Absolute 0.02     COMPREHENSIVE METABOLIC PANEL - Abnormal    Glucose 164 (*)     Sodium 136      Potassium 4.4      Chloride 98      Bicarbonate 27      Anion Gap 15      Urea Nitrogen 32 (*)     Creatinine 1.53 (*)     eGFR 42 (*)     Calcium 9.2      Albumin 3.8      Alkaline Phosphatase 139 (*)     Total Protein 7.3      AST 71 (*)     Bilirubin, Total 0.4       (*)    SERIAL  TROPONIN-INITIAL - Abnormal    Troponin I, High Sensitivity 23 (*)     Narrative:     Less than 99th percentile of normal range cutoff-  Female and children under 18 years old <14 ng/L; Male <21 ng/L: Negative  Repeat testing should be performed if clinically indicated.     Female and children under 18 years old 14-50 ng/L; Male 21-50 ng/L:  Consistent with possible cardiac damage and possible increased clinical   risk. Serial measurements may help to assess extent of myocardial damage.     >50 ng/L: Consistent with cardiac damage, increased clinical risk and  myocardial infarction. Serial measurements may help assess extent of   myocardial damage.      NOTE: Children less than 1 year old may have higher baseline troponin   levels and results should be interpreted in conjunction with the overall   clinical context.     NOTE: Troponin I testing is performed using a different   testing methodology at [facility] than at other   [facility]. Direct result comparisons should only   be made within the same method.   POCT GLUCOSE - Abnormal    POCT Glucose 164 (*)    URINALYSIS MICROSCOPIC WITH REFLEX CULTURE - Abnormal    WBC, Urine NONE      RBC, Urine 1-2      Bacteria, Urine 1+ (*)     Mucus, Urine FEW     LACTATE - Normal    Lactate 1.9      Narrative:     Venipuncture immediately after or during the administration of Metamizole may lead to falsely low results. Testing should be performed immediately prior to Metamizole dosing.   URINALYSIS WITH REFLEX CULTURE AND MICROSCOPIC - Normal    Color, Urine Yellow      Appearance, Urine Clear      Specific Gravity, Urine 1.019      pH, Urine 6.0      Protein, Urine 20 (TRACE)      Glucose, Urine Normal      Blood, Urine NEGATIVE      Ketones, Urine NEGATIVE      Bilirubin, Urine NEGATIVE      Urobilinogen, Urine Normal      Nitrite, Urine NEGATIVE      Leukocyte Esterase, Urine NEGATIVE     SERIAL TROPONIN, 1 HOUR - Normal    Troponin I, High Sensitivity  19      Narrative:     Less than 99th percentile of normal range cutoff-  Female and children under 18 years old <14 ng/L; Male <21 ng/L: Negative  Repeat testing should be performed if clinically indicated.     Female and children under 18 years old 14-50 ng/L; Male 21-50 ng/L:  Consistent with possible cardiac damage and possible increased clinical   risk. Serial measurements may help to assess extent of myocardial damage.     >50 ng/L: Consistent with cardiac damage, increased clinical risk and  myocardial infarction. Serial measurements may help assess extent of   myocardial damage.      NOTE: Children less than 1 year old may have higher baseline troponin   levels and results should be interpreted in conjunction with the overall   clinical context.     NOTE: Troponin I testing is performed using a different   testing methodology at [facility] than at [facility]. Direct result comparisons should only   be made within the same method.   SARS-COV-2 AND INFLUENZA A/B PCR - Normal    Flu A Result Not Detected      Flu B Result Not Detected      Coronavirus 2019, PCR Not Detected      Narrative:     This assay is an FDA-cleared, in vitro diagnostic nucleic acid amplification test for the qualitative detection and differentiation of SARS CoV-2/ Influenza A/B from nasopharyngeal specimens collected from individuals with signs and symptoms of respiratory tract infections, and has been validated for use at [facility]. Negative results do not preclude COVID-19/ Influenza A/B infections and should not be used as the sole basis for diagnosis, treatment, or other management decisions. Testing for SARS CoV-2 is recommended only for patients who meet current clinical and/or epidemiological criteria defined by federal, state, or local public health directives.   RSV PCR - Normal    RSV PCR Not Detected      Narrative:     This assay is an FDA-cleared, in vitro diagnostic  nucleic acid amplification test for the detection of RSV from nasopharyngeal specimens, and has been validated for use at [facility]. Negative results do not preclude RSV infections, and should not be used as the sole basis for diagnosis, treatment, or other management decisions. If Influenza A/B and RSV PCR results are negative, testing for Parainfluenza virus, Adenovirus and Metapneumovirus is routinely performed for pediatric oncology and intensive care inpatients at [facility], and is available on other patients by placing an add-on request.       BLOOD CULTURE   BLOOD CULTURE   TROPONIN SERIES- (INITIAL, 1 HR)    Narrative:     The following orders were created for panel order Troponin I Series, High Sensitivity (0, 1 HR).  Procedure                               Abnormality         Status                     ---------                               -----------         ------                     Troponin I, High Sensiti...[[id number]]  Abnormal            Final result               Troponin, High Sensitivi...[[id number]]  Normal              Final result                 Please view results for these tests on the individual orders.   URINALYSIS WITH REFLEX CULTURE AND MICROSCOPIC    Narrative:     The following orders were created for panel order Urinalysis with Reflex Culture and Microscopic.  Procedure                               Abnormality         Status                     ---------                               -----------         ------                     Urinalysis with Reflex C...[[id number]]  Normal              Final result               Extra Urine Gray Tube[[id number]]                            In process                   Please view results for these tests on the individual orders.   EXTRA URINE GRAY TUBE   POCT GLUCOSE METER       XR chest 1 view   Final Result   Bibasilar airspace opacities, as above. Clinical correlation and   continued follow-up until clearing is recommended.         MACRO:   None.        Signed by: Darrion Crowley 1/27/2025 2:37 PM   Dictation workstation:   [id number]      CT head wo IV contrast   Final Result   No evidence of acute cortical infarct or intracranial hemorrhage.        MACRO:   None             Signed by: Darrion Crowley 1/27/2025 2:36 PM   Dictation workstation:   [id number]              Gualberto Del Real MD      Procedure  ECG 12 Lead    Performed by: Raj Little PA-C  Authorized by: Gualberto THOMAS MD    Interpretation:     Details:  My EKG interpretation  Rate:     ECG rate:  80  Rhythm:     Rhythm: sinus rhythm    Ectopy:     Ectopy: PVCs    ST segments:     ST segments:  Normal  T waves:     T waves: normal         Raj Little PA-C  01/27/25 1700

## 2025-01-28 LAB
ANION GAP SERPL CALC-SCNC: 12 MMOL/L (ref 10–20)
BASOPHILS # BLD AUTO: 0.02 X10*3/UL (ref 0–0.1)
BASOPHILS NFR BLD AUTO: 0.2 %
BUN SERPL-MCNC: 24 MG/DL (ref 6–23)
CALCIUM SERPL-MCNC: 8.1 MG/DL (ref 8.6–10.3)
CHLORIDE SERPL-SCNC: 100 MMOL/L (ref 98–107)
CO2 SERPL-SCNC: 24 MMOL/L (ref 21–32)
CREAT SERPL-MCNC: 1.24 MG/DL (ref 0.5–1.3)
EGFRCR SERPLBLD CKD-EPI 2021: 54 ML/MIN/1.73M*2
EOSINOPHIL # BLD AUTO: 0.03 X10*3/UL (ref 0–0.4)
EOSINOPHIL NFR BLD AUTO: 0.3 %
ERYTHROCYTE [DISTWIDTH] IN BLOOD BY AUTOMATED COUNT: 16.2 % (ref 11.5–14.5)
GLUCOSE SERPL-MCNC: 142 MG/DL (ref 74–99)
HCT VFR BLD AUTO: 25.9 % (ref 41–52)
HGB BLD-MCNC: 8 G/DL (ref 13.5–17.5)
HOLD SPECIMEN: NORMAL
IMM GRANULOCYTES # BLD AUTO: 0.07 X10*3/UL (ref 0–0.5)
IMM GRANULOCYTES NFR BLD AUTO: 0.7 % (ref 0–0.9)
LYMPHOCYTES # BLD AUTO: 1.99 X10*3/UL (ref 0.8–3)
LYMPHOCYTES NFR BLD AUTO: 20.9 %
MAGNESIUM SERPL-MCNC: 1.96 MG/DL (ref 1.6–2.4)
MCH RBC QN AUTO: 25.1 PG (ref 26–34)
MCHC RBC AUTO-ENTMCNC: 30.9 G/DL (ref 32–36)
MCV RBC AUTO: 81 FL (ref 80–100)
MONOCYTES # BLD AUTO: 0.71 X10*3/UL (ref 0.05–0.8)
MONOCYTES NFR BLD AUTO: 7.5 %
NEUTROPHILS # BLD AUTO: 6.69 X10*3/UL (ref 1.6–5.5)
NEUTROPHILS NFR BLD AUTO: 70.4 %
NRBC BLD-RTO: 0 /100 WBCS (ref 0–0)
PLATELET # BLD AUTO: 415 X10*3/UL (ref 150–450)
POTASSIUM SERPL-SCNC: 4.3 MMOL/L (ref 3.5–5.3)
PROCALCITONIN SERPL-MCNC: 2.05 NG/ML
RBC # BLD AUTO: 3.19 X10*6/UL (ref 4.5–5.9)
SODIUM SERPL-SCNC: 132 MMOL/L (ref 136–145)
WBC # BLD AUTO: 9.5 X10*3/UL (ref 4.4–11.3)

## 2025-01-28 PROCEDURE — 99232 SBSQ HOSP IP/OBS MODERATE 35: CPT | Performed by: STUDENT IN AN ORGANIZED HEALTH CARE EDUCATION/TRAINING PROGRAM

## 2025-01-28 PROCEDURE — 2500000005 HC RX 250 GENERAL PHARMACY W/O HCPCS: Performed by: STUDENT IN AN ORGANIZED HEALTH CARE EDUCATION/TRAINING PROGRAM

## 2025-01-28 PROCEDURE — 94640 AIRWAY INHALATION TREATMENT: CPT

## 2025-01-28 PROCEDURE — 80048 BASIC METABOLIC PNL TOTAL CA: CPT | Performed by: STUDENT IN AN ORGANIZED HEALTH CARE EDUCATION/TRAINING PROGRAM

## 2025-01-28 PROCEDURE — 94660 CPAP INITIATION&MGMT: CPT

## 2025-01-28 PROCEDURE — 85025 COMPLETE CBC W/AUTO DIFF WBC: CPT | Performed by: STUDENT IN AN ORGANIZED HEALTH CARE EDUCATION/TRAINING PROGRAM

## 2025-01-28 PROCEDURE — 97165 OT EVAL LOW COMPLEX 30 MIN: CPT | Mod: GO

## 2025-01-28 PROCEDURE — 2500000004 HC RX 250 GENERAL PHARMACY W/ HCPCS (ALT 636 FOR OP/ED): Performed by: STUDENT IN AN ORGANIZED HEALTH CARE EDUCATION/TRAINING PROGRAM

## 2025-01-28 PROCEDURE — 1210000001 HC SEMI-PRIVATE ROOM DAILY

## 2025-01-28 PROCEDURE — 36415 COLL VENOUS BLD VENIPUNCTURE: CPT | Performed by: STUDENT IN AN ORGANIZED HEALTH CARE EDUCATION/TRAINING PROGRAM

## 2025-01-28 PROCEDURE — 83735 ASSAY OF MAGNESIUM: CPT | Performed by: STUDENT IN AN ORGANIZED HEALTH CARE EDUCATION/TRAINING PROGRAM

## 2025-01-28 PROCEDURE — 2500000001 HC RX 250 WO HCPCS SELF ADMINISTERED DRUGS (ALT 637 FOR MEDICARE OP): Performed by: STUDENT IN AN ORGANIZED HEALTH CARE EDUCATION/TRAINING PROGRAM

## 2025-01-28 PROCEDURE — 97161 PT EVAL LOW COMPLEX 20 MIN: CPT | Mod: GP | Performed by: PHYSICAL THERAPIST

## 2025-01-28 PROCEDURE — 2500000001 HC RX 250 WO HCPCS SELF ADMINISTERED DRUGS (ALT 637 FOR MEDICARE OP): Performed by: NURSE PRACTITIONER

## 2025-01-28 PROCEDURE — 93010 ELECTROCARDIOGRAM REPORT: CPT | Performed by: INTERNAL MEDICINE

## 2025-01-28 PROCEDURE — 2500000002 HC RX 250 W HCPCS SELF ADMINISTERED DRUGS (ALT 637 FOR MEDICARE OP, ALT 636 FOR OP/ED): Performed by: STUDENT IN AN ORGANIZED HEALTH CARE EDUCATION/TRAINING PROGRAM

## 2025-01-28 PROCEDURE — 84145 PROCALCITONIN (PCT): CPT | Mod: ELYLAB | Performed by: STUDENT IN AN ORGANIZED HEALTH CARE EDUCATION/TRAINING PROGRAM

## 2025-01-28 RX ORDER — CALCIUM CARBONATE 200(500)MG
500 TABLET,CHEWABLE ORAL 4 TIMES DAILY PRN
Status: DISCONTINUED | OUTPATIENT
Start: 2025-01-28 | End: 2025-02-07 | Stop reason: HOSPADM

## 2025-01-28 RX ORDER — ONDANSETRON HYDROCHLORIDE 2 MG/ML
4 INJECTION, SOLUTION INTRAVENOUS EVERY 6 HOURS PRN
Status: DISCONTINUED | OUTPATIENT
Start: 2025-01-28 | End: 2025-02-07 | Stop reason: HOSPADM

## 2025-01-28 RX ADMIN — APIXABAN 5 MG: 5 TABLET, FILM COATED ORAL at 08:09

## 2025-01-28 RX ADMIN — IPRATROPIUM BROMIDE AND ALBUTEROL SULFATE 3 ML: 2.5; .5 SOLUTION RESPIRATORY (INHALATION) at 12:17

## 2025-01-28 RX ADMIN — GUAIFENESIN 600 MG: 600 TABLET, EXTENDED RELEASE ORAL at 20:23

## 2025-01-28 RX ADMIN — GUAIFENESIN 600 MG: 600 TABLET, EXTENDED RELEASE ORAL at 08:09

## 2025-01-28 RX ADMIN — APIXABAN 5 MG: 5 TABLET, FILM COATED ORAL at 20:23

## 2025-01-28 RX ADMIN — Medication 2 L/MIN: at 06:57

## 2025-01-28 RX ADMIN — IPRATROPIUM BROMIDE AND ALBUTEROL SULFATE 3 ML: 2.5; .5 SOLUTION RESPIRATORY (INHALATION) at 06:57

## 2025-01-28 RX ADMIN — TRAMADOL HYDROCHLORIDE 50 MG: 50 TABLET, FILM COATED ORAL at 02:13

## 2025-01-28 RX ADMIN — CEFTRIAXONE SODIUM 1 G: 1 INJECTION, SOLUTION INTRAVENOUS at 09:15

## 2025-01-28 RX ADMIN — ONDANSETRON 4 MG: 2 INJECTION INTRAMUSCULAR; INTRAVENOUS at 18:51

## 2025-01-28 RX ADMIN — IPRATROPIUM BROMIDE AND ALBUTEROL SULFATE 3 ML: 2.5; .5 SOLUTION RESPIRATORY (INHALATION) at 01:59

## 2025-01-28 RX ADMIN — AZITHROMYCIN DIHYDRATE 500 MG: 250 TABLET, FILM COATED ORAL at 08:09

## 2025-01-28 RX ADMIN — STANDARDIZED SENNA CONCENTRATE 17.2 MG: 8.6 TABLET ORAL at 20:23

## 2025-01-28 RX ADMIN — ANTACID TABLETS 500 MG: 500 TABLET, CHEWABLE ORAL at 20:46

## 2025-01-28 RX ADMIN — Medication 2 L/MIN: at 20:01

## 2025-01-28 ASSESSMENT — COGNITIVE AND FUNCTIONAL STATUS - GENERAL
MOVING FROM LYING ON BACK TO SITTING ON SIDE OF FLAT BED WITH BEDRAILS: A LITTLE
DRESSING REGULAR LOWER BODY CLOTHING: A LITTLE
DAILY ACTIVITIY SCORE: 19
DRESSING REGULAR UPPER BODY CLOTHING: A LITTLE
HELP NEEDED FOR BATHING: A LITTLE
MOBILITY SCORE: 16
TOILETING: A LITTLE
STANDING UP FROM CHAIR USING ARMS: A LITTLE
WALKING IN HOSPITAL ROOM: A LITTLE
STANDING UP FROM CHAIR USING ARMS: A LITTLE
WALKING IN HOSPITAL ROOM: A LITTLE
MOVING FROM LYING ON BACK TO SITTING ON SIDE OF FLAT BED WITH BEDRAILS: A LITTLE
HELP NEEDED FOR BATHING: A LITTLE
PERSONAL GROOMING: A LITTLE
TURNING FROM BACK TO SIDE WHILE IN FLAT BAD: A LITTLE
MOVING TO AND FROM BED TO CHAIR: A LITTLE
DRESSING REGULAR UPPER BODY CLOTHING: A LITTLE
CLIMB 3 TO 5 STEPS WITH RAILING: TOTAL
CLIMB 3 TO 5 STEPS WITH RAILING: A LITTLE
DRESSING REGULAR LOWER BODY CLOTHING: A LITTLE
TOILETING: A LITTLE
MOVING TO AND FROM BED TO CHAIR: A LITTLE
DAILY ACTIVITIY SCORE: 20
MOBILITY SCORE: 19

## 2025-01-28 ASSESSMENT — PAIN SCALES - GENERAL
PAINLEVEL_OUTOF10: 0 - NO PAIN
PAINLEVEL_OUTOF10: 0 - NO PAIN

## 2025-01-28 ASSESSMENT — PAIN - FUNCTIONAL ASSESSMENT
PAIN_FUNCTIONAL_ASSESSMENT: 0-10
PAIN_FUNCTIONAL_ASSESSMENT: 0-10

## 2025-01-28 ASSESSMENT — ACTIVITIES OF DAILY LIVING (ADL): BATHING_ASSISTANCE: MINIMAL

## 2025-01-28 NOTE — PROGRESS NOTES
01/28/25 0901   Discharge Planning   Living Arrangements Children   Support Systems Children   Assistance Needed Independent   Type of Residence Private residence   Number of Stairs to Enter Residence 0   Number of Stairs Within Residence 0   Do you have animals or pets at home? No   Who is requesting discharge planning? Provider   Home or Post Acute Services In home services   Type of Home Care Services Home nursing visits;Home OT;Home PT   Expected Discharge Disposition Home H   Does the patient need discharge transport arranged? Yes   RoundTrip coordination needed? Yes   Has discharge transport been arranged? No   Patient Choice   Provider Choice list and CMS website (https://medicare.gov/care-compare#search) for post-acute Quality and Resource Measure Data were provided and reviewed with: Other (Comment)   Patient / Family choosing to utilize agency / facility established prior to hospitalization No   Stroke Family Assessment   Stroke Family Assessment Needed No   Intensity of Service   Intensity of Service 0-30 min     Patient admitted from home with shortness of breath, severe fatigue, not acting his normal self per daughter. Patient found to have pneumonia, being treated with IV antibiotics at this time. Patient lives with daughter, at this time plan will be to return home, home care may be an option of needed. CT team will continue to follow.

## 2025-01-28 NOTE — PROGRESS NOTES
Occupational Therapy    Evaluation    Patient Name: Chris Russell  MRN: [id number]  Department: [facility]  Room: [facility]1009-A  Today's Date: 1/28/2025  Time Calculation  Start Time: 0913  Stop Time: 0927  Time Calculation (min): 14 min      Assessment:  OT Assessment: Patient is limited by balance deficits, decreased strength/endurance which are limiting ability to complete ADLs, transfers and functional mobility.  Prognosis: Good  End of Session Communication: Bedside nurse  End of Session Patient Position: Up in chair, Alarm on (Call light within reach.)  OT Assessment Results: Decreased ADL status, Decreased endurance, Decreased functional mobility  Prognosis: Good  Plan:  Treatment Interventions: ADL retraining, Functional transfer training, Endurance training  OT Frequency: 2 times per week  OT Discharge Recommendations: Low intensity level of continued care (24/7 assist)  OT - OK to Discharge: Yes (Once medically appropriate.)  Treatment Interventions: ADL retraining, Functional transfer training, Endurance training    Subjective     General:  General  Reason for Referral: ADLs  Referred By: Dr. Mora (PT/OT 1/27)  Past Medical History Relevant to Rehab: includes: AAA repair, PE, HLD, CAD  Co-Treatment: PT  Co-Treatment Reason: To maximize functional outcomes and patient safety.  Prior to Session Communication: Bedside nurse (Cleared for therapy evaluation by RN.)  Patient Position Received: Up in chair, Alarm off, not on at start of session  General Comment: Pt. is a 89yo who presented to [facility] ED on 1/27/2025 with altered mental status and word finding difficulty x 5 minutes (then Pt. returned back to normal per family). Family reported Pt. has had a productive cough x 2-3 days. In ED, O2 = 91%.  Imaging 1/27/2025: CXR (+) bibasilar opacities/pneumonia  Head CT (-) acute findings  Hgb (1/28) 8.0 trending down  Na (1/28) 132 trending down  Dx: Pneumonia, weakness  Precautions:  Hearing/Visual Limitations: [place], B/L  hearing aides.  Medical Precautions: Fall precautions    Vital Signs Comment: Pt. on 2.5L when therapist arrived. O2 sat on 2.5L was 94% at rest. Pt. amb on RA per RN and O2 sat after amb on RA was 84%. Pt. was placed back on 2.5L and O2 sat increased back to 94% after 3 minutes.     Pain:  Pain Assessment  Pain Assessment: 0-10  0-10 (Numeric) Pain Score: 0 - No pain    Objective   Cognition:  Overall Cognitive Status: Within Functional Limits           Home Living:  Home Living Comments: Patient lives with daughter (works) in a 2 story condo with 2 AVIVA with a HR. Bedroom and bathroom on the main level. Walk in shower with a shower seat and grab bars.  Prior Function:  Prior Function Comments: Patient ambulates at mod I level with a cane; owns a FWW and electric scooter. Independent with ADLs, assist with IADLs. Denies falls in the past 3 months. Patient does not drive. No home O2.     ADL:  Eating Assistance:  (Setup)  Grooming Assistance:  (CGA for standing balance.)  Bathing Assistance: Minimal  UE Dressing Assistance:  (Setup)  LE Dressing Assistance: Minimal  Toileting Assistance with Device: Minimal  Activity Tolerance:  Endurance: Decreased tolerance for upright activites  Bed Mobility/Transfers:      Transfer 1  Technique 1: Sit to stand, Stand to sit  Transfer Device 1: Cane  Trials/Comments 1: Patient completed sit <> stand from the recliner with a SC at [facility] level.    Functional Mobility:  Functional Mobility  Functional Mobility Performed: Yes  Functional Mobility 1  Comments 1: Patient completed household distance functional mobility with a SC at [facility] level; patient tends to carry cane vs using it occasionally requiring cues for safety.     Standing Balance:  Dynamic Standing Balance  Dynamic Standing-Comments: Fair      Strength:  Strength Comments: B/L UE MMT WFL throughout.     Extremities: RUE   RUE : Within Functional Limits and LUE   LUE: Within Functional Limits    Outcome Measures:[facility] Daily  Activity  Putting on and taking off regular lower body clothing: A little  Bathing (including washing, rinsing, drying): A little  Putting on and taking off regular upper body clothing: A little  Toileting, which includes using toilet, bedpan or urinal: A little  Taking care of personal grooming such as brushing teeth: A little  Eating Meals: None  Daily Activity - Total Score: 19    Education Documentation  Body Mechanics, taught by Teresa Harris OT at 1/28/2025  1:24 PM.  Learner: Patient  Readiness: Acceptance  Method: Explanation  Response: Verbalizes Understanding, Needs Reinforcement    EDUCATION:  Education  Individual(s) Educated: Patient  Education Provided: POC discussed and agreed upon, Risk and benefits of OT discussed with patient or other, Fall precautons  Patient Response to Education: Patient/Caregiver Verbalized Understanding of Information    Goals:  Encounter Problems       Encounter Problems (Active)       OT Goals       Patient will complete household distance functional mobility at a mod I level.  (Progressing)       Start:  01/28/25    Expected End:  02/11/25            Patient will complete functional transfers at a mod I level.  (Progressing)       Start:  01/28/25    Expected End:  02/11/25            Patient will demonstrate fair + dynamic standing balance during functional tasks. (Progressing)       Start:  01/28/25    Expected End:  02/11/25            Patient will complete toileting at a mod I level.  (Progressing)       Start:  01/28/25    Expected End:  02/11/25            Patient will tolerate standing greater than 3 minutes during functional tasks.  (Progressing)       Start:  01/28/25    Expected End:  02/11/25

## 2025-01-28 NOTE — PROGRESS NOTES
Physical Therapy    Physical Therapy Evaluation    Patient Name: Chris Russell  MRN: [id number]  Today's Date: 1/28/2025   Time Calculation  Start Time: 0912  Stop Time: 0927  Time Calculation (min): 15 min  1009/1009-A    Assessment/Plan   PT Assessment  PT Assessment Results: Decreased endurance, Impaired balance, Decreased mobility  Rehab Prognosis: Good  Barriers to Discharge Home: No anticipated barriers (Recommend increased A from dtr, HHC PT, possible use of FWW)  Evaluation/Treatment Tolerance: Patient limited by fatigue  Medical Staff Made Aware: Yes  Strengths: Support of Caregivers, Living arrangement secure, Housing layout  Barriers to Participation: Comorbidities  End of Session Communication: Bedside nurse  Assessment Comment: Pt. presents with decreased activity tolerance and increasd O2 requirements with amb. Recommend continued therapy in acute care followed by continued therapy at a low intensity level following D/C. Recommend transition to FWW for amb.  End of Session Patient Position: Up in chair, Alarm on (Call light within reach)  IP OR SWING BED PT PLAN  Inpatient or Swing Bed: Inpatient  PT Plan  Treatment/Interventions: Bed mobility, Transfer training, Gait training, Balance training, Strengthening, Endurance training, Therapeutic exercise  PT Plan: Ongoing PT  PT Frequency: 3 times per week  PT Discharge Recommendations: Low intensity level of continued care (24/7 A)  PT Recommended Transfer Status: Assist x1, Assistive device  Physical Therapy eval completed per MD requisition. P.T. recommendations as outlined above. Recommend D/C from acute care when medically appropriate as deemed by medical staff.              General Visit Information:  General  Reason for Referral: impaired mobility  Referred By: Dr. Mora (PT/OT 1/27)  Past Medical History Relevant to Rehab: includes: AAA repair, PE, HLD, CAD  Family/Caregiver Present: No  Co-Treatment: OT  Co-Treatment Reason: Pt. seen with OT to  maximize safety and function  Prior to Session Communication: Bedside nurse  Patient Position Received: Up in chair, Alarm off, not on at start of session  Preferred Learning Style: auditory, verbal  General Comment: Pt. is a 89yo who presented to [facility] ED on 1/27/2025 with altered mental status and word finding difficulty x 5 minutes (then Pt. returned back to normal per family). Family reported Pt. has had a productive cough x 2-3 days. In ED, O2 = 91%.    Imaging 1/27/2025:   CXR (+) bibasilar opacities/pneumonia    Head CT (-) acute findings    Hgb (1/28) 8.0 trending down    Na (1/28) 132 trending down    Dx: Pneumonia, weakness    Home Living:  Home Living  Home Living Comments: Pt. lives with dtr in a 2 level condo with 2 AVIVA with HR. Pt.'s bed/bath on 1st floor with walkin shower with a seat and grab bars.    Prior Level of Function:  Prior Function Per Pt/Caregiver Report  Prior Function Comments: Pt. amb with cane PTA. Pt. owns a FWW and an electric scooter. Pt. stated he was I with ADLs and dtr completed IADLs PTA. RN stated that Pt. has been fearful about getting in the shower at home. Pt. denied falls in last 3 months. Pt. does not drive. No O2 at home PTA per Pt.    Precautions:  Precautions  Hearing/Visual Limitations: [place], wears B hearing aides  Medical Precautions:  (Activity order: OOB with A)  Precautions Comment: Per EMR: High fall risk    Vital Signs:  Vital Signs  SpO2:  (Pt. on 2.5L when therapist arrived. O2 sat on 2.5L was 94% at rest. Pt. amb on RA per RN and O2 sat after amb on RA was 84%. Pt. was placed back on 2.5L and O2 sat increased back to 94% after 3 minutes)  Objective     Pain:  Pain Assessment  Pain Assessment: 0-10  0-10 (Numeric) Pain Score: 0 - No pain    Cognition:  Cognition  Overall Cognitive Status: Within Functional Limits    General Assessments:  General Observation  General Observation: Tele; Pt. on 2.5L O2 when therapist arrived. PT. was amb on RA per RN (O2 dropped to  84% and Pt. was placed on 2.5: again   Activity Tolerance  Endurance: Decreased tolerance for upright activites                 Dynamic Sitting Balance  Dynamic Sitting-Comments: Good static and dynamic sitting balance  Dynamic Standing Balance  Dynamic Standing-Comments: Fair+ static and dynamic standing balance    Functional Assessments:     Bed Mobility  Bed Mobility: No  Transfers  Transfer: Yes  Transfer 1  Technique 1: Sit to stand, Stand to sit  Transfer Device 1: Cane  Transfer Level of Assistance 1: Contact guard  Trials/Comments 1: CGA for safety  Ambulation/Gait Training  Ambulation/Gait Training Performed: Yes  Ambulation/Gait Training 1  Surface 1: Level tile  Device 1: Single point cane  Assistance 1: Contact guard  Quality of Gait 1: Narrow base of support (slow, reciprocal gait with shortened step lengths, occasional lateral trunk sway and path deviations. Pt. frequently carried cane while amb and required repeated VC's to use cane on the floor as he amb. VC's for proper sequence and technique.)  Comments/Distance (ft) 1: 45'; CGA for safety, VC's for correct technique/use of cane and sequence  Stairs  Stairs: No       Extremity/Trunk Assessments:  RUE   RUE : Within Functional Limits  LUE   LUE: Within Functional Limits  RLE   RLE : Within Functional Limits  LLE   LLE : Within Functional Limits    Outcome Measures:     [facility] Basic Mobility  Turning from your back to your side while in a flat bed without using bedrails: A little  Moving from lying on your back to sitting on the side of a flat bed without using bedrails: A little  Moving to and from bed to chair (including a wheelchair): A little  Standing up from a chair using your arms (e.g. wheelchair or bedside chair): A little  To walk in hospital room: A little  Climbing 3-5 steps with railing: Total  Basic Mobility - Total Score: 16                                                             Goals:  Encounter Problems       Encounter Problems  (Active)       PT Problem       Pt. will transfer supine/sit with MOD I (Progressing)       Start:  01/28/25    Expected End:  02/11/25            Pt. will transfer sit/stand with safest and least restrictive assistive device with MOD I  (Progressing)       Start:  01/28/25    Expected End:  02/11/25            Pt.will ambulate 60'' with safest and least restrictive assistive device with MOD I  (Progressing)       Start:  01/28/25    Expected End:  02/11/25            Pt. will amb up/down 2 steps with HR and cane with CGA  (Not Progressing)       Start:  01/28/25    Expected End:  02/11/25            Pt. will perform 2 x 15 B LE AROM exercises  (Not Progressing)       Start:  01/28/25    Expected End:  02/11/25                 Education Documentation  Mobility Training, taught by Richardson Baez, PT at 1/28/2025 11:16 AM.  Learner: Patient  Readiness: Acceptance  Method: Explanation  Response: Verbalizes Understanding, Needs Reinforcement  Comment: Role of PT, transfers, amb, safety, PT POC

## 2025-01-28 NOTE — NURSING NOTE
At 1305, Doctor Edd was in to assess patient and to discuss plan of care. Daughter was present at time of visit.

## 2025-01-28 NOTE — CARE PLAN
Problem: Pain - Adult  Goal: Verbalizes/displays adequate comfort level or baseline comfort level  Outcome: Progressing     Problem: Safety - Adult  Goal: Free from fall injury  Outcome: Progressing     Problem: Discharge Planning  Goal: Discharge to home or other facility with appropriate resources  Outcome: Progressing     Problem: Chronic Conditions and Co-morbidities  Goal: Patient's chronic conditions and co-morbidity symptoms are monitored and maintained or improved  Outcome: Progressing     Problem: Nutrition  Goal: Nutrient intake appropriate for maintaining nutritional needs  Outcome: Progressing     Problem: Diabetes  Goal: Achieve decreasing blood glucose levels by end of shift  Outcome: Progressing  Goal: Increase stability of blood glucose readings by end of shift  Outcome: Progressing  Goal: Decrease in ketones present in urine by end of shift  Outcome: Progressing  Goal: Maintain electrolyte levels within acceptable range throughout shift  Outcome: Progressing  Goal: Maintain glucose levels >70mg/dl to <250mg/dl throughout shift  Outcome: Progressing  Goal: No changes in neurological exam by end of shift  Outcome: Progressing  Goal: Learn about and adhere to nutrition recommendations by end of shift  Outcome: Progressing  Goal: Vital signs within normal range for age by end of shift  Outcome: Progressing  Goal: Increase self care and/or family involovement by end of shift  Outcome: Progressing  Goal: Receive DSME education by end of shift  Outcome: Progressing   The patient's goals for the shift include rest    The clinical goals for the shift include Patient will maintain SpO2 >92% throughout shift

## 2025-01-29 ENCOUNTER — APPOINTMENT (OUTPATIENT)
Dept: CARDIOLOGY | Facility: HOSPITAL | Age: OVER 89
End: 2025-01-29
Payer: MEDICARE

## 2025-01-29 LAB
ANION GAP SERPL CALC-SCNC: 14 MMOL/L (ref 10–20)
BASOPHILS # BLD AUTO: 0.02 X10*3/UL (ref 0–0.1)
BASOPHILS NFR BLD AUTO: 0.2 %
BNP SERPL-MCNC: 2465 PG/ML (ref 0–99)
BUN SERPL-MCNC: 22 MG/DL (ref 6–23)
CALCIUM SERPL-MCNC: 8.6 MG/DL (ref 8.6–10.3)
CHLORIDE SERPL-SCNC: 94 MMOL/L (ref 98–107)
CO2 SERPL-SCNC: 23 MMOL/L (ref 21–32)
CREAT SERPL-MCNC: 1.21 MG/DL (ref 0.5–1.3)
EGFRCR SERPLBLD CKD-EPI 2021: 55 ML/MIN/1.73M*2
EOSINOPHIL # BLD AUTO: 0.04 X10*3/UL (ref 0–0.4)
EOSINOPHIL NFR BLD AUTO: 0.3 %
ERYTHROCYTE [DISTWIDTH] IN BLOOD BY AUTOMATED COUNT: 15.8 % (ref 11.5–14.5)
FLUAV RNA RESP QL NAA+PROBE: NOT DETECTED
FLUBV RNA RESP QL NAA+PROBE: NOT DETECTED
GLUCOSE SERPL-MCNC: 181 MG/DL (ref 74–99)
HCT VFR BLD AUTO: 30.6 % (ref 41–52)
HGB BLD-MCNC: 9.6 G/DL (ref 13.5–17.5)
HOLD SPECIMEN: NORMAL
IMM GRANULOCYTES # BLD AUTO: 0.07 X10*3/UL (ref 0–0.5)
IMM GRANULOCYTES NFR BLD AUTO: 0.6 % (ref 0–0.9)
LYMPHOCYTES # BLD AUTO: 2.59 X10*3/UL (ref 0.8–3)
LYMPHOCYTES NFR BLD AUTO: 20.5 %
MAGNESIUM SERPL-MCNC: 2.05 MG/DL (ref 1.6–2.4)
MCH RBC QN AUTO: 25.5 PG (ref 26–34)
MCHC RBC AUTO-ENTMCNC: 31.4 G/DL (ref 32–36)
MCV RBC AUTO: 81 FL (ref 80–100)
MONOCYTES # BLD AUTO: 0.94 X10*3/UL (ref 0.05–0.8)
MONOCYTES NFR BLD AUTO: 7.4 %
NEUTROPHILS # BLD AUTO: 8.96 X10*3/UL (ref 1.6–5.5)
NEUTROPHILS NFR BLD AUTO: 71 %
NRBC BLD-RTO: 0 /100 WBCS (ref 0–0)
PLATELET # BLD AUTO: 509 X10*3/UL (ref 150–450)
POTASSIUM SERPL-SCNC: 4.5 MMOL/L (ref 3.5–5.3)
RBC # BLD AUTO: 3.76 X10*6/UL (ref 4.5–5.9)
SODIUM SERPL-SCNC: 126 MMOL/L (ref 136–145)
WBC # BLD AUTO: 12.6 X10*3/UL (ref 4.4–11.3)

## 2025-01-29 PROCEDURE — 2500000001 HC RX 250 WO HCPCS SELF ADMINISTERED DRUGS (ALT 637 FOR MEDICARE OP): Performed by: STUDENT IN AN ORGANIZED HEALTH CARE EDUCATION/TRAINING PROGRAM

## 2025-01-29 PROCEDURE — 87502 INFLUENZA DNA AMP PROBE: CPT | Performed by: STUDENT IN AN ORGANIZED HEALTH CARE EDUCATION/TRAINING PROGRAM

## 2025-01-29 PROCEDURE — 94640 AIRWAY INHALATION TREATMENT: CPT

## 2025-01-29 PROCEDURE — 93005 ELECTROCARDIOGRAM TRACING: CPT

## 2025-01-29 PROCEDURE — 83880 ASSAY OF NATRIURETIC PEPTIDE: CPT | Performed by: STUDENT IN AN ORGANIZED HEALTH CARE EDUCATION/TRAINING PROGRAM

## 2025-01-29 PROCEDURE — 2500000004 HC RX 250 GENERAL PHARMACY W/ HCPCS (ALT 636 FOR OP/ED): Performed by: STUDENT IN AN ORGANIZED HEALTH CARE EDUCATION/TRAINING PROGRAM

## 2025-01-29 PROCEDURE — 99232 SBSQ HOSP IP/OBS MODERATE 35: CPT | Performed by: STUDENT IN AN ORGANIZED HEALTH CARE EDUCATION/TRAINING PROGRAM

## 2025-01-29 PROCEDURE — 83735 ASSAY OF MAGNESIUM: CPT | Performed by: STUDENT IN AN ORGANIZED HEALTH CARE EDUCATION/TRAINING PROGRAM

## 2025-01-29 PROCEDURE — 1210000001 HC SEMI-PRIVATE ROOM DAILY

## 2025-01-29 PROCEDURE — 97530 THERAPEUTIC ACTIVITIES: CPT | Mod: GP,CQ

## 2025-01-29 PROCEDURE — 2500000005 HC RX 250 GENERAL PHARMACY W/O HCPCS: Performed by: STUDENT IN AN ORGANIZED HEALTH CARE EDUCATION/TRAINING PROGRAM

## 2025-01-29 PROCEDURE — 94660 CPAP INITIATION&MGMT: CPT

## 2025-01-29 PROCEDURE — 80048 BASIC METABOLIC PNL TOTAL CA: CPT | Performed by: STUDENT IN AN ORGANIZED HEALTH CARE EDUCATION/TRAINING PROGRAM

## 2025-01-29 PROCEDURE — 85025 COMPLETE CBC W/AUTO DIFF WBC: CPT | Performed by: STUDENT IN AN ORGANIZED HEALTH CARE EDUCATION/TRAINING PROGRAM

## 2025-01-29 PROCEDURE — 2500000002 HC RX 250 W HCPCS SELF ADMINISTERED DRUGS (ALT 637 FOR MEDICARE OP, ALT 636 FOR OP/ED): Performed by: STUDENT IN AN ORGANIZED HEALTH CARE EDUCATION/TRAINING PROGRAM

## 2025-01-29 PROCEDURE — 36415 COLL VENOUS BLD VENIPUNCTURE: CPT | Performed by: STUDENT IN AN ORGANIZED HEALTH CARE EDUCATION/TRAINING PROGRAM

## 2025-01-29 RX ORDER — ATORVASTATIN CALCIUM 20 MG/1
40 TABLET, FILM COATED ORAL NIGHTLY
Status: DISCONTINUED | OUTPATIENT
Start: 2025-01-29 | End: 2025-02-07 | Stop reason: HOSPADM

## 2025-01-29 RX ORDER — TAMSULOSIN HYDROCHLORIDE 0.4 MG/1
0.8 CAPSULE ORAL DAILY
Status: DISCONTINUED | OUTPATIENT
Start: 2025-01-29 | End: 2025-02-07 | Stop reason: HOSPADM

## 2025-01-29 RX ORDER — TRAZODONE HYDROCHLORIDE 50 MG/1
50 TABLET ORAL NIGHTLY
Status: DISCONTINUED | OUTPATIENT
Start: 2025-01-29 | End: 2025-02-04

## 2025-01-29 RX ORDER — ALBUTEROL SULFATE 90 UG/1
2 INHALANT RESPIRATORY (INHALATION) EVERY 4 HOURS PRN
Status: DISCONTINUED | OUTPATIENT
Start: 2025-01-29 | End: 2025-02-07 | Stop reason: HOSPADM

## 2025-01-29 RX ORDER — TRAZODONE HYDROCHLORIDE 50 MG/1
100 TABLET ORAL NIGHTLY PRN
Status: DISCONTINUED | OUTPATIENT
Start: 2025-01-29 | End: 2025-02-04

## 2025-01-29 RX ADMIN — STANDARDIZED SENNA CONCENTRATE 17.2 MG: 8.6 TABLET ORAL at 20:00

## 2025-01-29 RX ADMIN — POLYETHYLENE GLYCOL 3350 17 G: 17 POWDER, FOR SOLUTION ORAL at 09:11

## 2025-01-29 RX ADMIN — ONDANSETRON 4 MG: 2 INJECTION INTRAMUSCULAR; INTRAVENOUS at 13:49

## 2025-01-29 RX ADMIN — APIXABAN 5 MG: 5 TABLET, FILM COATED ORAL at 20:01

## 2025-01-29 RX ADMIN — Medication 5 MG: at 20:00

## 2025-01-29 RX ADMIN — Medication 2 L/MIN: at 19:48

## 2025-01-29 RX ADMIN — APIXABAN 5 MG: 5 TABLET, FILM COATED ORAL at 08:35

## 2025-01-29 RX ADMIN — TRAZODONE HYDROCHLORIDE 100 MG: 50 TABLET ORAL at 21:58

## 2025-01-29 RX ADMIN — Medication 5 MG: at 02:43

## 2025-01-29 RX ADMIN — TRAZODONE HYDROCHLORIDE 50 MG: 50 TABLET ORAL at 21:58

## 2025-01-29 RX ADMIN — ACETAMINOPHEN 650 MG: 325 TABLET ORAL at 13:49

## 2025-01-29 RX ADMIN — IPRATROPIUM BROMIDE AND ALBUTEROL SULFATE 3 ML: 2.5; .5 SOLUTION RESPIRATORY (INHALATION) at 13:29

## 2025-01-29 RX ADMIN — ACETAMINOPHEN 650 MG: 325 TABLET ORAL at 20:01

## 2025-01-29 RX ADMIN — ONDANSETRON 4 MG: 2 INJECTION INTRAMUSCULAR; INTRAVENOUS at 00:56

## 2025-01-29 RX ADMIN — AZITHROMYCIN DIHYDRATE 500 MG: 250 TABLET, FILM COATED ORAL at 08:35

## 2025-01-29 RX ADMIN — Medication 2 L/MIN: at 09:40

## 2025-01-29 RX ADMIN — TAMSULOSIN HYDROCHLORIDE 0.8 MG: 0.4 CAPSULE ORAL at 15:25

## 2025-01-29 RX ADMIN — CEFTRIAXONE SODIUM 1 G: 1 INJECTION, SOLUTION INTRAVENOUS at 09:38

## 2025-01-29 RX ADMIN — GUAIFENESIN 600 MG: 600 TABLET, EXTENDED RELEASE ORAL at 08:35

## 2025-01-29 RX ADMIN — IPRATROPIUM BROMIDE AND ALBUTEROL SULFATE 3 ML: 2.5; .5 SOLUTION RESPIRATORY (INHALATION) at 00:18

## 2025-01-29 RX ADMIN — IPRATROPIUM BROMIDE AND ALBUTEROL SULFATE 3 ML: 2.5; .5 SOLUTION RESPIRATORY (INHALATION) at 19:46

## 2025-01-29 RX ADMIN — ATORVASTATIN CALCIUM 40 MG: 20 TABLET, FILM COATED ORAL at 20:00

## 2025-01-29 RX ADMIN — ONDANSETRON 4 MG: 2 INJECTION INTRAMUSCULAR; INTRAVENOUS at 20:00

## 2025-01-29 RX ADMIN — GUAIFENESIN 600 MG: 600 TABLET, EXTENDED RELEASE ORAL at 20:00

## 2025-01-29 RX ADMIN — IPRATROPIUM BROMIDE AND ALBUTEROL SULFATE 3 ML: 2.5; .5 SOLUTION RESPIRATORY (INHALATION) at 07:19

## 2025-01-29 ASSESSMENT — COGNITIVE AND FUNCTIONAL STATUS - GENERAL
STANDING UP FROM CHAIR USING ARMS: A LITTLE
CLIMB 3 TO 5 STEPS WITH RAILING: TOTAL
DRESSING REGULAR UPPER BODY CLOTHING: A LITTLE
WALKING IN HOSPITAL ROOM: A LITTLE
STANDING UP FROM CHAIR USING ARMS: A LITTLE
MOVING TO AND FROM BED TO CHAIR: A LITTLE
WALKING IN HOSPITAL ROOM: A LITTLE
MOVING FROM LYING ON BACK TO SITTING ON SIDE OF FLAT BED WITH BEDRAILS: A LITTLE
MOBILITY SCORE: 19
DAILY ACTIVITIY SCORE: 20
TOILETING: A LITTLE
MOBILITY SCORE: 16
MOVING TO AND FROM BED TO CHAIR: A LITTLE
DRESSING REGULAR LOWER BODY CLOTHING: A LITTLE
HELP NEEDED FOR BATHING: A LITTLE
TURNING FROM BACK TO SIDE WHILE IN FLAT BAD: A LITTLE
CLIMB 3 TO 5 STEPS WITH RAILING: A LITTLE
MOVING FROM LYING ON BACK TO SITTING ON SIDE OF FLAT BED WITH BEDRAILS: A LITTLE

## 2025-01-29 ASSESSMENT — PAIN SCALES - GENERAL
PAINLEVEL_OUTOF10: 0 - NO PAIN
PAINLEVEL_OUTOF10: 5 - MODERATE PAIN
PAINLEVEL_OUTOF10: 0 - NO PAIN

## 2025-01-29 ASSESSMENT — PAIN - FUNCTIONAL ASSESSMENT
PAIN_FUNCTIONAL_ASSESSMENT: 0-10
PAIN_FUNCTIONAL_ASSESSMENT: 0-10

## 2025-01-29 ASSESSMENT — PAIN DESCRIPTION - LOCATION: LOCATION: BACK

## 2025-01-29 ASSESSMENT — PAIN DESCRIPTION - ORIENTATION: ORIENTATION: LOWER

## 2025-01-29 NOTE — PROGRESS NOTES
Physical Therapy    Physical Therapy Treatment    Patient Name: Chris Russell  MRN: [id number]  Department: [facility]  Room: [facility]1009  Today's Date: 1/29/2025  Time Calculation  Start Time: 0938  Stop Time: 0959  Time Calculation (min): 21 min         Assessment/Plan   PT Assessment  PT Assessment Results: Decreased endurance, Impaired balance, Decreased mobility  Rehab Prognosis: Good  Barriers to Discharge Home: No anticipated barriers  End of Session Communication: Bedside nurse  Assessment Comment: Pt. presents with decreased activity tolerance and increasd O2 requirements with amb. Recommend continued therapy in acute care followed by continued therapy at a low intensity level following D/C. Recommend transition to W for amb.  End of Session Patient Position: Up in chair, Alarm on  PT Plan  Inpatient/Swing Bed or Outpatient: Inpatient  PT Plan  Treatment/Interventions: Transfer training, Gait training, Therapeutic exercise  PT Plan: Ongoing PT  PT Frequency: 3 times per week  PT Discharge Recommendations: Low intensity level of continued care  PT Recommended Transfer Status: Assist x1, Assistive device    General Visit Information:   PT  Visit  PT Received On: 01/29/25  General  Reason for Referral: Impaired mobility  Referred By: Dr. Mora (PT/OT 1/27)  Past Medical History Relevant to Rehab: includes: AAA repair, PE, HLD, CAD  Prior to Session Communication: Bedside nurse  Patient Position Received: Up in chair, Alarm off, not on at start of session  General Comment: Pt is pleasant and cooperative does report feeling increased fatigue today. Pt states daughter is sick at home and nursing made aware.    Subjective   Precautions:  Precautions  Medical Precautions: Fall precautions  Precautions Comment: Per EMR: High fall risk     Date/Time Vitals Session Patient Position Pulse Resp SpO2 BP MAP (mmHg)    01/29/25 1329 --  --  --  --  96 %  --  --                 Objective   Pain:  Pain Assessment  0-10  (Numeric) Pain Score:  (Pt does not quantify with a number.)  Pain Type: Acute pain  Pain Location: Generalized  Cognition:  Cognition  Overall Cognitive Status: Within Functional Limits  Coordination:     Postural Control:     Extremity/Trunk Assessments:        Activity Tolerance:  Activity Tolerance  Endurance: Decreased tolerance for upright activites  Treatments:  Therapeutic Exercise  Therapeutic Exercise Performed: Yes (Seated AP, LAQ and marches x 10 eaBLE)    Ambulation/Gait Training  Ambulation/Gait Training Performed: Yes (Pt ambulating with a slow, reciprocal gait with decreased step height and lenghth. Pt given CGA and use SPC but often noted to pick cane up and carry.)  Transfers  Transfer: Yes (Sit<>stand from chair with SPC and CGA.)    Outcome Measures:  [facility] Basic Mobility  Turning from your back to your side while in a flat bed without using bedrails: A little  Moving from lying on your back to sitting on the side of a flat bed without using bedrails: A little  Moving to and from bed to chair (including a wheelchair): A little  Standing up from a chair using your arms (e.g. wheelchair or bedside chair): A little  To walk in hospital room: A little  Climbing 3-5 steps with railing: Total  Basic Mobility - Total Score: 16    Education Documentation  Mobility Training, taught by Jocelin Rivera PTA at 1/29/2025  2:56 PM.  Learner: Patient  Readiness: Acceptance  Method: Explanation  Response: Needs Reinforcement    Education Comments  No comments found.        EDUCATION:  Outpatient Education  Individual(s) Educated: Patient  Education Provided: Body Mechanics, Fall Risk, Home Exercise Program    Encounter Problems       Encounter Problems (Active)       PT Problem       Pt. will transfer supine/sit with MOD I (Progressing)       Start:  01/28/25    Expected End:  02/11/25            Pt. will transfer sit/stand with safest and least restrictive assistive device with MOD I  (Progressing)        Start:  01/28/25    Expected End:  02/11/25            Pt.will ambulate 60'' with safest and least restrictive assistive device with MOD I  (Progressing)       Start:  01/28/25    Expected End:  02/11/25            Pt. will amb up/down 2 steps with HR and cane with CGA  (Not Progressing)       Start:  01/28/25    Expected End:  02/11/25            Pt. will perform 2 x 15 B LE AROM exercises  (Progressing)       Start:  01/28/25    Expected End:  02/11/25

## 2025-01-29 NOTE — PROGRESS NOTES
"Chris Russell is a 89 y.o. male on day 2 of admission presenting with Community acquired pneumonia, unspecified laterality.    Subjective   States that he feels short of breath still, nausea has improved, says that he was having some chills last night.  Objective     Physical Exam  Constitutional:       Appearance: Normal appearance.   Cardiovascular:      Rate and Rhythm: Normal rate and regular rhythm.   Pulmonary:      Effort: Pulmonary effort is normal.      Breath sounds: Wheezing present. No rales.   Abdominal:      Palpations: Abdomen is soft.   Neurological:      Mental Status: He is alert. Mental status is at baseline.   Psychiatric:         Mood and Affect: Mood normal.         Last Recorded Vitals  Blood pressure 116/74, pulse (!) 115, temperature 36.3 °C (97.3 °F), temperature source Temporal, resp. rate 20, height 1.88 m (6' 2\"), weight 99.8 kg (220 lb), SpO2 96%.  Intake/Output last 3 Shifts:  I/O last 3 completed shifts:  In: 530 (5.3 mL/kg) [P.O.:480; IV Piggyback:50]  Out: - (0 mL/kg)   Weight: 99.8 kg     Relevant Results                              Assessment/Plan   Assessment & Plan  Community acquired pneumonia, unspecified laterality    89-year-old male with past medical history of PE, BPH admitted with bacterial pneumonia    Patient is still hypoxemic  despite PNA treatment  Is also developing worsening hyponatremia  Hyponatremia could be  likely secondary to fluid overload  Will order TTE, flu, BNP  Continue ceftriaxone, azithromycin, Mucinex, DuoNebs, incentive spirometry  On Eliquis for previous PE  PT OT ordered  Try to wean O2 as tolerated  Trazodone, Flomax, statin resumed  Continue supportive care  DVT prophylaxis      Zackary Puga MD      "

## 2025-01-29 NOTE — PROGRESS NOTES
"Chris Russell is a 89 y.o. male on day 2 of admission presenting with Community acquired pneumonia, unspecified laterality.    Subjective   Patient seen and examined.  Reports improvement in breathing, currently on oxygen Which she does not use at home.  No fevers or chills no chest pain.  Physical Exam  Constitutional:       Appearance: Normal appearance.   Cardiovascular:      Rate and Rhythm: Normal rate and regular rhythm.   Pulmonary:      Effort: Pulmonary effort is normal.      Breath sounds: Wheezing and rales present.   Abdominal:      Palpations: Abdomen is soft.   Neurological:      Mental Status: He is alert. Mental status is at baseline.   Psychiatric:         Mood and Affect: Mood normal.         Last Recorded Vitals  Blood pressure 116/74, pulse (!) 115, temperature 36.3 °C (97.3 °F), temperature source Temporal, resp. rate 20, height 1.88 m (6' 2\"), weight 99.8 kg (220 lb), SpO2 96%.  Intake/Output last 3 Shifts:  I/O last 3 completed shifts:  In: 530 (5.3 mL/kg) [P.O.:480; IV Piggyback:50]  Out: - (0 mL/kg)   Weight: 99.8 kg     Relevant Results                              Assessment/Plan   Assessment & Plan  Community acquired pneumonia, unspecified laterality    89-year-old male with past medical history of PE, BPH admitted with bacterial pneumonia    Continue ceftriaxone, azithromycin  Mucinex, DuoNebs, incentive spirometry  PT OT eval ordered  On Eliquis for PE  Continue supplemental oxygen and try to wean as tolerated   procalcitonin was previously elevated.  Supportive care  DVT prophylaxis   Anticipating discharge tomorrow if he feels better          Zackary Puga MD      "

## 2025-01-29 NOTE — CARE PLAN
The patient's goals for the shift include to be able to get home    The clinical goals for the shift include Comfort

## 2025-01-30 ENCOUNTER — APPOINTMENT (OUTPATIENT)
Dept: CARDIOLOGY | Facility: HOSPITAL | Age: OVER 89
End: 2025-01-30
Payer: MEDICARE

## 2025-01-30 LAB
ANION GAP SERPL CALC-SCNC: 16 MMOL/L (ref 10–20)
AORTIC VALVE MEAN GRADIENT: 4 MMHG
AORTIC VALVE PEAK VELOCITY: 1.53 M/S
AV PEAK GRADIENT: 9 MMHG
AVA (PEAK VEL): 1.67 CM2
AVA (VTI): 1.67 CM2
BASOPHILS # BLD AUTO: 0.03 X10*3/UL (ref 0–0.1)
BASOPHILS NFR BLD AUTO: 0.3 %
BUN SERPL-MCNC: 27 MG/DL (ref 6–23)
CALCIUM SERPL-MCNC: 7.9 MG/DL (ref 8.6–10.3)
CHLORIDE SERPL-SCNC: 89 MMOL/L (ref 98–107)
CO2 SERPL-SCNC: 23 MMOL/L (ref 21–32)
CREAT SERPL-MCNC: 1.3 MG/DL (ref 0.5–1.3)
EGFRCR SERPLBLD CKD-EPI 2021: 51 ML/MIN/1.73M*2
EJECTION FRACTION APICAL 4 CHAMBER: 40.4
EJECTION FRACTION: 35 %
EOSINOPHIL # BLD AUTO: 0.14 X10*3/UL (ref 0–0.4)
EOSINOPHIL NFR BLD AUTO: 1.3 %
ERYTHROCYTE [DISTWIDTH] IN BLOOD BY AUTOMATED COUNT: 15.3 % (ref 11.5–14.5)
GLUCOSE SERPL-MCNC: 138 MG/DL (ref 74–99)
HCT VFR BLD AUTO: 29 % (ref 41–52)
HGB BLD-MCNC: 9.4 G/DL (ref 13.5–17.5)
HOLD SPECIMEN: NORMAL
IMM GRANULOCYTES # BLD AUTO: 0.11 X10*3/UL (ref 0–0.5)
IMM GRANULOCYTES NFR BLD AUTO: 1 % (ref 0–0.9)
LEFT ATRIUM VOLUME AREA LENGTH INDEX BSA: 43.3 ML/M2
LEFT VENTRICLE INTERNAL DIMENSION DIASTOLE: 5.8 CM (ref 3.5–6)
LEFT VENTRICULAR OUTFLOW TRACT DIAMETER: 2 CM
LV EJECTION FRACTION BIPLANE: 38 %
LYMPHOCYTES # BLD AUTO: 2.32 X10*3/UL (ref 0.8–3)
LYMPHOCYTES NFR BLD AUTO: 21.8 %
MAGNESIUM SERPL-MCNC: 2 MG/DL (ref 1.6–2.4)
MCH RBC QN AUTO: 25.3 PG (ref 26–34)
MCHC RBC AUTO-ENTMCNC: 32.4 G/DL (ref 32–36)
MCV RBC AUTO: 78 FL (ref 80–100)
MITRAL VALVE E/A RATIO: 1.45
MONOCYTES # BLD AUTO: 1.07 X10*3/UL (ref 0.05–0.8)
MONOCYTES NFR BLD AUTO: 10 %
NEUTROPHILS # BLD AUTO: 6.98 X10*3/UL (ref 1.6–5.5)
NEUTROPHILS NFR BLD AUTO: 65.6 %
NRBC BLD-RTO: 0 /100 WBCS (ref 0–0)
PLATELET # BLD AUTO: 478 X10*3/UL (ref 150–450)
POTASSIUM SERPL-SCNC: 4.7 MMOL/L (ref 3.5–5.3)
RBC # BLD AUTO: 3.71 X10*6/UL (ref 4.5–5.9)
RIGHT VENTRICLE FREE WALL PEAK S': 17.5 CM/S
RIGHT VENTRICLE PEAK SYSTOLIC PRESSURE: 75.2 MMHG
SODIUM SERPL-SCNC: 123 MMOL/L (ref 136–145)
TRICUSPID ANNULAR PLANE SYSTOLIC EXCURSION: 2.1 CM
WBC # BLD AUTO: 10.7 X10*3/UL (ref 4.4–11.3)

## 2025-01-30 PROCEDURE — 2500000004 HC RX 250 GENERAL PHARMACY W/ HCPCS (ALT 636 FOR OP/ED): Performed by: STUDENT IN AN ORGANIZED HEALTH CARE EDUCATION/TRAINING PROGRAM

## 2025-01-30 PROCEDURE — 99223 1ST HOSP IP/OBS HIGH 75: CPT | Performed by: INTERNAL MEDICINE

## 2025-01-30 PROCEDURE — 83735 ASSAY OF MAGNESIUM: CPT | Performed by: STUDENT IN AN ORGANIZED HEALTH CARE EDUCATION/TRAINING PROGRAM

## 2025-01-30 PROCEDURE — 93010 ELECTROCARDIOGRAM REPORT: CPT | Performed by: INTERNAL MEDICINE

## 2025-01-30 PROCEDURE — 99232 SBSQ HOSP IP/OBS MODERATE 35: CPT | Performed by: STUDENT IN AN ORGANIZED HEALTH CARE EDUCATION/TRAINING PROGRAM

## 2025-01-30 PROCEDURE — 2500000001 HC RX 250 WO HCPCS SELF ADMINISTERED DRUGS (ALT 637 FOR MEDICARE OP): Performed by: STUDENT IN AN ORGANIZED HEALTH CARE EDUCATION/TRAINING PROGRAM

## 2025-01-30 PROCEDURE — 93306 TTE W/DOPPLER COMPLETE: CPT | Performed by: INTERNAL MEDICINE

## 2025-01-30 PROCEDURE — 97535 SELF CARE MNGMENT TRAINING: CPT | Mod: GO

## 2025-01-30 PROCEDURE — 2500000002 HC RX 250 W HCPCS SELF ADMINISTERED DRUGS (ALT 637 FOR MEDICARE OP, ALT 636 FOR OP/ED): Performed by: NURSE PRACTITIONER

## 2025-01-30 PROCEDURE — 85025 COMPLETE CBC W/AUTO DIFF WBC: CPT | Performed by: STUDENT IN AN ORGANIZED HEALTH CARE EDUCATION/TRAINING PROGRAM

## 2025-01-30 PROCEDURE — 80048 BASIC METABOLIC PNL TOTAL CA: CPT | Performed by: STUDENT IN AN ORGANIZED HEALTH CARE EDUCATION/TRAINING PROGRAM

## 2025-01-30 PROCEDURE — 36415 COLL VENOUS BLD VENIPUNCTURE: CPT | Performed by: STUDENT IN AN ORGANIZED HEALTH CARE EDUCATION/TRAINING PROGRAM

## 2025-01-30 PROCEDURE — 93005 ELECTROCARDIOGRAM TRACING: CPT

## 2025-01-30 PROCEDURE — 1200000002 HC GENERAL ROOM WITH TELEMETRY DAILY

## 2025-01-30 PROCEDURE — 2500000005 HC RX 250 GENERAL PHARMACY W/O HCPCS: Performed by: STUDENT IN AN ORGANIZED HEALTH CARE EDUCATION/TRAINING PROGRAM

## 2025-01-30 PROCEDURE — C8929 TTE W OR WO FOL WCON,DOPPLER: HCPCS

## 2025-01-30 PROCEDURE — 2500000001 HC RX 250 WO HCPCS SELF ADMINISTERED DRUGS (ALT 637 FOR MEDICARE OP): Performed by: NURSE PRACTITIONER

## 2025-01-30 PROCEDURE — 2500000002 HC RX 250 W HCPCS SELF ADMINISTERED DRUGS (ALT 637 FOR MEDICARE OP, ALT 636 FOR OP/ED): Performed by: STUDENT IN AN ORGANIZED HEALTH CARE EDUCATION/TRAINING PROGRAM

## 2025-01-30 PROCEDURE — 94640 AIRWAY INHALATION TREATMENT: CPT

## 2025-01-30 RX ORDER — BENZONATATE 100 MG/1
200 CAPSULE ORAL 3 TIMES DAILY PRN
Status: DISCONTINUED | OUTPATIENT
Start: 2025-01-30 | End: 2025-02-05

## 2025-01-30 RX ORDER — METOPROLOL SUCCINATE 25 MG/1
25 TABLET, EXTENDED RELEASE ORAL DAILY
Status: DISCONTINUED | OUTPATIENT
Start: 2025-01-30 | End: 2025-02-03

## 2025-01-30 RX ORDER — FUROSEMIDE 10 MG/ML
20 INJECTION INTRAMUSCULAR; INTRAVENOUS EVERY 12 HOURS
Status: DISCONTINUED | OUTPATIENT
Start: 2025-01-30 | End: 2025-02-02

## 2025-01-30 RX ORDER — VALSARTAN 80 MG/1
40 TABLET ORAL DAILY
Status: DISCONTINUED | OUTPATIENT
Start: 2025-01-30 | End: 2025-02-07 | Stop reason: HOSPADM

## 2025-01-30 RX ADMIN — CEFTRIAXONE SODIUM 1 G: 1 INJECTION, SOLUTION INTRAVENOUS at 08:51

## 2025-01-30 RX ADMIN — METOPROLOL SUCCINATE 25 MG: 25 TABLET, EXTENDED RELEASE ORAL at 13:11

## 2025-01-30 RX ADMIN — IPRATROPIUM BROMIDE AND ALBUTEROL SULFATE 3 ML: 2.5; .5 SOLUTION RESPIRATORY (INHALATION) at 13:12

## 2025-01-30 RX ADMIN — VALSARTAN 40 MG: 80 TABLET, FILM COATED ORAL at 13:11

## 2025-01-30 RX ADMIN — POLYETHYLENE GLYCOL 3350 17 G: 17 POWDER, FOR SOLUTION ORAL at 08:51

## 2025-01-30 RX ADMIN — APIXABAN 5 MG: 5 TABLET, FILM COATED ORAL at 20:01

## 2025-01-30 RX ADMIN — ACETAMINOPHEN 650 MG: 325 TABLET ORAL at 20:36

## 2025-01-30 RX ADMIN — PERFLUTREN 2 ML OF DILUTION: 6.52 INJECTION, SUSPENSION INTRAVENOUS at 08:20

## 2025-01-30 RX ADMIN — STANDARDIZED SENNA CONCENTRATE 17.2 MG: 8.6 TABLET ORAL at 20:00

## 2025-01-30 RX ADMIN — TAMSULOSIN HYDROCHLORIDE 0.8 MG: 0.4 CAPSULE ORAL at 08:50

## 2025-01-30 RX ADMIN — GUAIFENESIN 600 MG: 600 TABLET, EXTENDED RELEASE ORAL at 20:01

## 2025-01-30 RX ADMIN — TRAZODONE HYDROCHLORIDE 100 MG: 50 TABLET ORAL at 21:40

## 2025-01-30 RX ADMIN — IPRATROPIUM BROMIDE AND ALBUTEROL SULFATE 3 ML: 2.5; .5 SOLUTION RESPIRATORY (INHALATION) at 20:14

## 2025-01-30 RX ADMIN — APIXABAN 5 MG: 5 TABLET, FILM COATED ORAL at 08:50

## 2025-01-30 RX ADMIN — FUROSEMIDE 20 MG: 10 INJECTION, SOLUTION INTRAMUSCULAR; INTRAVENOUS at 11:28

## 2025-01-30 RX ADMIN — Medication 2 L/MIN: at 20:14

## 2025-01-30 RX ADMIN — Medication 2 L/MIN: at 08:39

## 2025-01-30 RX ADMIN — FUROSEMIDE 20 MG: 10 INJECTION, SOLUTION INTRAMUSCULAR; INTRAVENOUS at 23:12

## 2025-01-30 RX ADMIN — ATORVASTATIN CALCIUM 40 MG: 20 TABLET, FILM COATED ORAL at 20:02

## 2025-01-30 RX ADMIN — TRAZODONE HYDROCHLORIDE 50 MG: 50 TABLET ORAL at 20:02

## 2025-01-30 RX ADMIN — BENZONATATE 200 MG: 100 CAPSULE ORAL at 20:36

## 2025-01-30 RX ADMIN — AZITHROMYCIN DIHYDRATE 500 MG: 250 TABLET, FILM COATED ORAL at 08:50

## 2025-01-30 RX ADMIN — GUAIFENESIN 600 MG: 600 TABLET, EXTENDED RELEASE ORAL at 08:50

## 2025-01-30 ASSESSMENT — COGNITIVE AND FUNCTIONAL STATUS - GENERAL
DRESSING REGULAR LOWER BODY CLOTHING: A LITTLE
DRESSING REGULAR UPPER BODY CLOTHING: A LITTLE
MOBILITY SCORE: 19
TOILETING: A LITTLE
CLIMB 3 TO 5 STEPS WITH RAILING: A LITTLE
PERSONAL GROOMING: A LITTLE
WALKING IN HOSPITAL ROOM: A LITTLE
HELP NEEDED FOR BATHING: A LITTLE
DRESSING REGULAR UPPER BODY CLOTHING: A LITTLE
STANDING UP FROM CHAIR USING ARMS: A LITTLE
TOILETING: A LITTLE
HELP NEEDED FOR BATHING: A LITTLE
DAILY ACTIVITIY SCORE: 20
DAILY ACTIVITIY SCORE: 19
MOVING FROM LYING ON BACK TO SITTING ON SIDE OF FLAT BED WITH BEDRAILS: A LITTLE
MOVING TO AND FROM BED TO CHAIR: A LITTLE
DRESSING REGULAR LOWER BODY CLOTHING: A LITTLE

## 2025-01-30 ASSESSMENT — PAIN SCALES - GENERAL: PAINLEVEL_OUTOF10: 0 - NO PAIN

## 2025-01-30 ASSESSMENT — ACTIVITIES OF DAILY LIVING (ADL): HOME_MANAGEMENT_TIME_ENTRY: 23

## 2025-01-30 ASSESSMENT — PAIN - FUNCTIONAL ASSESSMENT: PAIN_FUNCTIONAL_ASSESSMENT: 0-10

## 2025-01-30 NOTE — CARE PLAN
The patient's goals for the shift include to be able to get home    The clinical goals for the shift include Safety

## 2025-01-30 NOTE — NURSING NOTE
This nurse spoke with wife in regards to patient's home Noxafil. Patient's wife will be bring in the medication from home on

## 2025-01-30 NOTE — PROGRESS NOTES
Spoke with patient's daughter Ayala, she states she would still want patient to come home, does not want SNF. Ayala states her brother will be on hand to help out while she recovers. They would like home care, [facility] was choice after giving selection. CT team will continue to follow.

## 2025-01-30 NOTE — PROGRESS NOTES
"Occupational Therapy  OT Treatment    Patient Name: Chris Russell  MRN: [id number]  Department: [facility]  Room: [facility][note [id number]-A  Today's Date: 1/30/2025  Time Calculation  Start Time: 0858  Stop Time: 0921  Time Calculation (min): 23 min        Assessment:  OT Assessment: Session focused on increasing IND with ADL tasks and transfers. Pt reported increased fatigue and feeling \"winded\" this date (RN aware) but is very motivated and pleasant.  Evaluation/Treatment Tolerance: Patient tolerated treatment well, Patient limited by fatigue  Medical Staff Made Aware: Yes  End of Session Communication: Bedside nurse  End of Session Patient Position: Up in chair, Alarm on (RN present)  Evaluation/Treatment Tolerance: Patient tolerated treatment well, Patient limited by fatigue  Medical Staff Made Aware: Yes  Plan:  Treatment Interventions: ADL retraining, Functional transfer training, Endurance training  OT Frequency: 2 times per week  OT Discharge Recommendations: Low intensity level of continued care  OT - OK to Discharge: Yes (Once medically appropriate.)  Treatment Interventions: ADL retraining, Functional transfer training, Endurance training    Subjective   Previous Visit Info:  OT Last Visit  OT Received On: 01/30/25  General:  General  Reason for Referral: ADL impairment  Referred By: Dr. Mora (PT/OT 1/27)  Past Medical History Relevant to Rehab: includes: AAA repair, PE, HLD, CAD  Family/Caregiver Present: No  Prior to Session Communication: Bedside nurse (Cleared for therapy treatment)  Patient Position Received: Bed, 3 rail up, Alarm on  General Comment: Pt pleasant and agreeable to participate in OT.  Precautions:  Hearing/Visual Limitations: [place], B/L hearing aides.  Medical Precautions: Fall precautions, Oxygen therapy device and L/min     Date/Time Vitals Session Patient Position Pulse Resp SpO2 BP MAP (mmHg)    01/30/25 0839 --  --  --  --  93 %  --  --     01/30/25 08:44:04 --  --  69  --  99 %  134/69  96       "           Pain:  Pain Assessment  Pain Assessment: 0-10  0-10 (Numeric) Pain Score: 0 - No pain    Objective    Cognition:  Cognition  Overall Cognitive Status: Within Functional Limits    Activities of Daily Living: Grooming  Grooming Comments: Pt completed oral hygiene while seated at table with S/U.    UE Bathing  UE Bathing Comments: Pt completed UB bathing while seated in chair with S/U.    UE Dressing  UE Dressing Comments: Pt required MIN A to don gown.    LE Dressing  LE Dressing: Yes  LE Dressing Comments: Pt required MAX A to doff/don socks while seated.    Bed Mobility/Transfers: Bed Mobility  Bed Mobility: Yes  Bed Mobility 1  Bed Mobility Comments 1: Transition from supine to sit EOB with MIN A to elevate trunk, able to manage BLE OOB.    Transfers  Transfer: Yes  Transfer 1  Trials/Comments 1: Pt completed SPT from bed to chair without device with CGA, small LOB posteriorly prior to sitting but pt able to self correct and control descent to chair.    Sitting Balance:  Static Sitting Balance  Static Sitting-Comment/Number of Minutes: good  Standing Balance:  Static Standing Balance  Static Standing-Comment/Number of Minutes: fair  Dynamic Standing Balance  Dynamic Standing-Comments: fair / fair -    Outcome Measures:[facility] Daily Activity  Putting on and taking off regular lower body clothing: A little  Bathing (including washing, rinsing, drying): A little  Putting on and taking off regular upper body clothing: A little  Toileting, which includes using toilet, bedpan or urinal: A little  Taking care of personal grooming such as brushing teeth: A little  Eating Meals: None  Daily Activity - Total Score: 19    Education Documentation  Body Mechanics, taught by Victorina Morales OT at 1/30/2025  9:37 AM.  Learner: Patient  Readiness: Acceptance  Method: Explanation, Demonstration  Response: Verbalizes Understanding, Demonstrated Understanding, Needs Reinforcement    IP EDUCATION:  Education  Individual(s)  Educated: Patient  Education Comment: Pt educated on safe functional transfer techniques.    Goals:  Encounter Problems       Encounter Problems (Active)       OT Goals       Patient will complete household distance functional mobility at a mod I level.  (Progressing)       Start:  01/28/25    Expected End:  02/11/25            Patient will complete functional transfers at a mod I level.  (Progressing)       Start:  01/28/25    Expected End:  02/11/25            Patient will demonstrate fair + dynamic standing balance during functional tasks. (Progressing)       Start:  01/28/25    Expected End:  02/11/25            Patient will complete toileting at a mod I level.  (Progressing)       Start:  01/28/25    Expected End:  02/11/25            Patient will tolerate standing greater than 3 minutes during functional tasks.  (Progressing)       Start:  01/28/25    Expected End:  02/11/25

## 2025-01-30 NOTE — CONSULTS
Cardiology Consult Note      Date:   1/30/2025  Patient name:  Chris Russell  Date of admission:  1/27/2025  1:54 PM  MRN:   [id number]  YOB: 1930  Time of Consult:  12:43 PM    Consulting Cardiologist: Dr. Venkata Gardner, APRN, CNP  Primary Cardiologist:  Celeste Martin  CCCOLLIN  Referring Provider: Dr Puga      Admission Diagnosis:     Community acquired pneumonia, unspecified laterality      History of Present Illness:      Chris Russell is a 89 y.o.  male patient who is being at the request of Dr. Puga for inpatient consultation of CHF. He was admitted on 1/27/2025.  Previous [facility] records have been reviewed in detail.    History of CAD, hypertension, PVCs, dyslipidemia, tobacco abuse, COLE, hard of hearing  Patient states the last couple days he has been stressed extremely short of breath not quite feeling like himself very fatigued.  Comes into the emergency department they admitted him to the 10th floor they got an echo that showed an ejection fraction of 35%.  I spoke with the patient even he is very hard of hearing go on to tell me that he did see a cardiologist with the [facility] Dr Del Valle at that time he was having numerous palpitations they had done a Zio patch and at that time was attempting get his records from [place].  Apparently the patient had stated that he did have an LAD lesion but they elected for medical management only.  Since he has been here in [place] over the last 5 years he has never had an echo and they never received the records from [place] for any comparison.  We did the echo and shows an ejection fraction of 35%.  Patient states though in the last couple weeks she has had increased shortness of breath just fatigue not quite feeling like himself they had done a chest x-ray and he thought that he had community-acquired pneumonia put him on IV antibiotics.  Patient was diagnosed with a PE back in June 2023 at that time he  was started on Eliquis and he has been on Eliquis ever since.  Positive for shortness of breath, fatigue  Denies chest pain, fever, chills, PND, orthopnea, claudications    EKG is normal sinus rhythm with PACs  Magnesium 2.0  Sodium 123  Creatinine 1.30  BNP 2465    Cardiac history  CONCLUSIONS:   1. The left ventricular systolic function is moderately decreased, with a visually estimated ejection fraction of 35%.   2. There is global hypokinesis of the left ventricle with minor regional variations.   3. Spectral Doppler shows a Grade II (pseudonormal pattern) of left ventricular diastolic filling with an elevated left atrial pressure.   4. There is normal right ventricular global systolic function.   5. Right ventricle is upper limits of normal in size.   6. There is no evidence of mitral valve stenosis.   7. Mild mitral valve regurgitation.   8. Mild tricuspid regurgitation is visualized.   9. Mild aortic valve stenosis.  10. Pulmonary hypertension is present.  11. Severely elevated pulmonary artery pressure.  12. The inferior vena cava appears mildly dilated.  Zio patch 2021 showed multiple PVCs      Allergies:     No Known Allergies      Past Medical History:     Past Medical History:   Diagnosis Date    Coronary artery disease        Past Surgical History:     Past Surgical History:   Procedure Laterality Date    ABDOMINAL AORTIC ANEURYSM REPAIR      CHOLECYSTECTOMY         Family History:     Family History   Problem Relation Name Age of Onset    No Known Problems Mother      No Known Problems Father         Social History:     Social History     Tobacco Use    Smoking status: Never     Passive exposure: Never    Smokeless tobacco: Never   Substance Use Topics    Alcohol use: Never    Drug use: Never       CURRENT INPATIENT MEDICATIONS    apixaban, 5 mg, oral, BID  atorvastatin, 40 mg, oral, Nightly  cefTRIAXone, 1 g, intravenous, q24h  furosemide, 20 mg, intravenous, q12h  guaiFENesin, 600 mg, oral,  BID  ipratropium-albuteroL, 3 mL, nebulization, q6h  metoprolol succinate XL, 25 mg, oral, Daily  oxygen, , inhalation, Continuous - Inhalation  polyethylene glycol, 17 g, oral, Daily  sennosides, 2 tablet, oral, Nightly  tamsulosin, 0.8 mg, oral, Daily  traZODone, 50 mg, oral, Nightly  valsartan, 40 mg, oral, Daily         Current Outpatient Medications   Medication Instructions    albuterol (Ventolin HFA) 90 mcg/actuation inhaler 2 puffs, inhalation, Every 4 hours PRN    apixaban (Eliquis DVT-PE Treat 30D Start) 5 mg (74 tabs) tablets,dose pack 2 tablets, Every 12 hours    atorvastatin (LIPITOR) 40 mg, oral, Daily    benzocaine-menthoL (Cepacol Sore Throat, lyndsey-men,) 15-2.3 mg lozenge Place into mouth between cheek and gum.    Eliquis 5 mg, oral, 2 times daily    multivitamin capsule 1 capsule, Daily RT    tamsulosin (FLOMAX) 0.8 mg, oral, Daily    traZODone (DESYREL) 100 mg, oral, Nightly PRN    traZODone (DESYREL) 50 mg, oral, Nightly        Review of Systems:      12 point review of systems was obtained in detail and is negative other than that detailed above.    Vital Signs:     Vitals:    01/29/25 1531 01/29/25 1957 01/30/25 0839 01/30/25 0844   BP: 137/72 139/67  134/69   BP Location:  Right arm     Patient Position:  Sitting     Pulse: 96 98  69   Resp:  17     Temp: 36.4 °C (97.5 °F) 36.6 °C (97.9 °F)  35.9 °C (96.6 °F)   TempSrc:  Temporal     SpO2: 96% 95% 93% 99%   Weight:       Height:           Intake/Output Summary (Last 24 hours) at 1/30/2025 1243  Last data filed at 1/30/2025 1029  Gross per 24 hour   Intake 340 ml   Output --   Net 340 ml       Wt Readings from Last 4 Encounters:   01/27/25 99.8 kg (220 lb)   11/12/24 97.5 kg (215 lb)   08/13/24 97.6 kg (215 lb 3.2 oz)   05/14/24 96.3 kg (212 lb 6.4 oz)       Physical Examination:     GENERAL APPEARANCE: Well developed, well nourished, in no acute distress.  Extremely hard of hearing  CHEST: Symmetric and non-tender.  INTEGUMENT: Skin warm and  dry, without gross excoriationis or lesions.  HEENT: No gross abnormalities of conjunctiva, teeth, gums, oral mucosa  NECK: Supple, no JVD, no bruit. Thyroid not palpable. Carotid upstrokes normal.  NEURO/PSHCY: Alert and oriented x3; appropriate behavior and responses and responses, grossly normal cerebellar function with normal balance and coordination  LUNGS: Inspiratory and expiratory wheezes with bilateral rails  HEART: S1, S2 regular with 2 out of 6 systolic murmur  ABDOMEN: Soft, nontender, no palpable hepatosplenomegaly, no mases, no bruits. Abdominal aorta not noted to be enlarged.  MUSCULOSKELETAL: Ambulatory with normal tandem gait.  EXTREMITIES: Warm with good color, no clubbing or cyanois.  Trace edema  PERIPHERAL VASCULAR: Pulses present and equally palpable; 1+ throughout. No femoral bruits.      Lab:     CBC:   Results from last 7 days   Lab Units 01/30/25  0604 01/29/25  0539 01/28/25  0614   WBC AUTO x10*3/uL 10.7 12.6* 9.5   RBC AUTO x10*6/uL 3.71* 3.76* 3.19*   HEMOGLOBIN g/dL 9.4* 9.6* 8.0*   HEMATOCRIT % 29.0* 30.6* 25.9*   MCV fL 78* 81 81   MCH pg 25.3* 25.5* 25.1*   MCHC g/dL 32.4 31.4* 30.9*   RDW % 15.3* 15.8* 16.2*   PLATELETS AUTO x10*3/uL 478* 509* 415     CMP:    Results from last 7 days   Lab Units 01/30/25  0604 01/29/25  0539 01/28/25  0614 01/27/25  1436   SODIUM mmol/L 123* 126* 132* 136   POTASSIUM mmol/L 4.7 4.5 4.3 4.4   CHLORIDE mmol/L 89* 94* 100 98   CO2 mmol/L 23 23 24 27   BUN mg/dL 27* 22 24* 32*   CREATININE mg/dL 1.30 1.21 1.24 1.53*   GLUCOSE mg/dL 138* 181* 142* 164*   PROTEIN TOTAL g/dL  --   --   --  7.3   CALCIUM mg/dL 7.9* 8.6 8.1* 9.2   BILIRUBIN TOTAL mg/dL  --   --   --  0.4   ALK PHOS U/L  --   --   --  139*   AST U/L  --   --   --  71*   ALT U/L  --   --   --  124*     BMP:    Results from last 7 days   Lab Units 01/30/25  0604 01/29/25  0539 01/28/25  0614   SODIUM mmol/L 123* 126* 132*   POTASSIUM mmol/L 4.7 4.5 4.3   CHLORIDE mmol/L 89* 94* 100   CO2  mmol/L 23 23 24   BUN mg/dL 27* 22 24*   CREATININE mg/dL 1.30 1.21 1.24   CALCIUM mg/dL 7.9* 8.6 8.1*   GLUCOSE mg/dL 138* 181* 142*     Magnesium:  Results from last 7 days   Lab Units 01/30/25  0604 01/29/25  0539 01/28/25  0614   MAGNESIUM mg/dL 2.00 2.05 1.96     Troponin:    Results from last 7 days   Lab Units 01/27/25  1601 01/27/25  1436   TROPHS ng/L 19 23*     BNP:   Results from last 7 days   Lab Units 01/29/25  1253   BNP pg/mL 2,465*     Lipid Panel:         Diagnostic Studies:   @No results found for this or any previous visit.    ECG 12 Lead    Result Date: 1/27/2025  Sinus rhythm with Blocked Premature atrial complexes with occasional Premature ventricular complexes Low voltage QRS Left anterior fascicular block Possible Anterolateral infarct (cited on or before 20-JUN-2023) Abnormal ECG When compared with ECG of 20-JUN-2023 02:35, Premature ventricular complexes are now Present Premature atrial complexes are now Present Left anterior fascicular block is now Present Questionable change in initial forces of Anterolateral leads    XR chest 1 view    Result Date: 1/27/2025  Interpreted By:  Darrion Crowley, STUDY: XR CHEST 1 VIEW  1/27/2025 2:25 pm   INDICATION: Signs/Symptoms:AMS   COMPARISON: 06/04/2024   ACCESSION NUMBER(S): [id number]   ORDERING CLINICIAN: PAL THORNTON   TECHNIQUE: A single AP portable radiograph of the chest was obtained.   FINDINGS: Multiple cardiac monitoring leads are seen over the chest.  Bibasilar airspace consolidations are seen and may represent small pleural effusions, atelectasis and/or pneumonia. No pneumothorax is identified. The cardiac silhouette is within normal limits for size.       Bibasilar airspace opacities, as above. Clinical correlation and continued follow-up until clearing is recommended.   MACRO: None.   Signed by: Darrion Crowley 1/27/2025 2:37 PM Dictation workstation:   [id number]    CT head wo IV contrast    Result Date: 1/27/2025  Interpreted By:   Darrion Crowley, STUDY: CT HEAD WO IV CONTRAST; 1/27/2025 2:22 pm   INDICATION: Signs/Symptoms:AMS.   COMPARISON: None.   ACCESSION NUMBER(S): [id number]   ORDERING CLINICIAN: PAL THORNTON   TECHNIQUE: Contiguous axial CT images were obtained through the head at 5 mm slice thickness without contrast administration.   FINDINGS: INTRACRANIAL: The ventricles, sulci and basal cisterns are within normal limits for size and configuration. The grey-white differentiation is intact. There is no mass effect or midline shift. There is no extraaxial fluid collection. There is no intracranial hemorrhage.  The calvarium is unremarkable.   EXTRACRANIAL: Visualized paranasal sinuses and mastoids are clear.       No evidence of acute cortical infarct or intracranial hemorrhage.   MACRO: None     Signed by: Darrion Crowley 1/27/2025 2:36 PM Dictation workstation:   [id number]        Radiology:     Transthoracic Echo (TTE) Complete   Final Result      XR chest 1 view   Final Result   Bibasilar airspace opacities, as above. Clinical correlation and   continued follow-up until clearing is recommended.        MACRO:   None.        Signed by: Darrion Crowley 1/27/2025 2:37 PM   Dictation workstation:   [id number]      CT head wo IV contrast   Final Result   No evidence of acute cortical infarct or intracranial hemorrhage.        MACRO:   None             Signed by: Darrion Crowley 1/27/2025 2:36 PM   Dictation workstation:   [id number]          Problem List:     Patient Active Problem List   Diagnosis    Mixed hyperlipidemia    Benign prostatic hyperplasia with urinary frequency    AK (actinic keratosis)    Vertigo    Bilateral tinnitus    Controlled type 2 diabetes mellitus without complication, with long-term current use of insulin (Multi)    Obstructive sleep apnea syndrome    Bilateral hearing loss    Type 2 diabetes mellitus with hyperglycemia, without long-term current use of insulin    Community acquired pneumonia, unspecified  laterality       Assessment:   Community-acquired pneumonia  Acute systolic heart failure NYHA class III EF 35%  Hyponatremia  Hypertension  Dyslipidemia  COLE  History of PE currently on Eliquis  BPH  DM      Plan:   Tele monitoring  Serial enzymes  2d echo done EF 35%  Daily EKG's  Eliquis 5 mg p.o. twice daily  Toprol-XL 25 mg daily  Diovan 40 mg daily  No Aldactone due to high potassium  Lasix 20 mg IV push every 12  Strict intake and output  GDMT  Further recommendations per Dr Enoc Gardner CNP  [facility]      Of note, this documentation is completed using the Dragon Dictation system (voice recognition software). There may be spelling and/or grammatical errors that were not corrected prior to final submission.      Electronically signed by QING Shore-CNP, on 1/30/2025 at 12:43 PM    Patient seen and examined in conjunction with QING Rod and agree with the evaluation as noted above.  I have personally interviewed and examined the patient.   I have personally and independently reviewed the pertinentlabs and diagnostic testing.  I have personally verified the elements of the history and physical listed above and changes, if any, are noted below.    89-year-old gentleman with history of CAD, hypertension, dyslipidemia, COLE as well as multiple PVCs who presented increased shortness of breath and fatigue for which she had an echocardiogram that showed moderate LV dysfunction with EF of about 35% for which cardiology was consulted.  He had a remote history of cardiac catheterization that showed an LAD lesion for which the patient opted for medical management only.  Patient denied any ongoing chest pain but he had increased shortness of breath and was found to have recurrent acquired pneumonia for which she is currently on antibiotics.  He denies orthopnea proximal nocturnal dyspnea.  His initial labs showed BNP of greater than  2400, with a borderline mildly elevated troponin at 23.  EKG shows sinus rhythm with occasional PVCs and low voltage QRS, but no acute ST-T wave changes.    Agree with exam as noted above.  Cardiac exam reveals regular.  Second heart sound with frequent PACs and a soft 2/6 systolic murmur at the left sternal border.  There is reduced breath sounds in the lung bases bilaterally.  There is 2+ bilateral ankle edema.    ASSESSMENT AND PLAN:  New onset cardiomyopathy: Unclear whether this is ischemic or nonischemic, probably mixed, but patient declined to have invasive evaluation with cardiac catheterization and opts for medical management.  We will initiate GDMT with beta-blockers and ARB and uptitrate as blood pressure tolerates.  Will hold off on Aldactone at this time because of a increased risk of hyperkalemia in view of his CKD and other comorbidities.  Will continue with gentle diuresis and transition to p.o. Lasix prior to discharge.  Community acquired pneumonia: Currently on IV antibiotics as noted above, will defer to primary team for continued management.  History of CAD: As reported by the patient with no definite records to confirm, but we will continue with current medical management since patient does decline invasive cardiac catheterization.

## 2025-01-31 ENCOUNTER — APPOINTMENT (OUTPATIENT)
Dept: RADIOLOGY | Facility: HOSPITAL | Age: OVER 89
End: 2025-01-31
Payer: MEDICARE

## 2025-01-31 ENCOUNTER — APPOINTMENT (OUTPATIENT)
Dept: CARDIOLOGY | Facility: HOSPITAL | Age: OVER 89
End: 2025-01-31
Payer: MEDICARE

## 2025-01-31 LAB
ANION GAP SERPL CALC-SCNC: 13 MMOL/L (ref 10–20)
ANION GAP SERPL CALC-SCNC: 15 MMOL/L (ref 10–20)
APPEARANCE UR: CLEAR
ATRIAL RATE: 128 BPM
ATRIAL RATE: 271 BPM
ATRIAL RATE: 286 BPM
ATRIAL RATE: 92 BPM
ATRIAL RATE: 95 BPM
BASOPHILS # BLD AUTO: 0.02 X10*3/UL (ref 0–0.1)
BASOPHILS NFR BLD AUTO: 0.2 %
BILIRUB UR STRIP.AUTO-MCNC: NEGATIVE MG/DL
BUN SERPL-MCNC: 24 MG/DL (ref 6–23)
BUN SERPL-MCNC: 28 MG/DL (ref 6–23)
CALCIUM SERPL-MCNC: 7.2 MG/DL (ref 8.6–10.3)
CALCIUM SERPL-MCNC: 7.6 MG/DL (ref 8.6–10.3)
CARDIAC TROPONIN I PNL SERPL HS: 232 NG/L (ref 0–20)
CHLORIDE SERPL-SCNC: 81 MMOL/L (ref 98–107)
CHLORIDE SERPL-SCNC: 85 MMOL/L (ref 98–107)
CO2 SERPL-SCNC: 23 MMOL/L (ref 21–32)
CO2 SERPL-SCNC: 24 MMOL/L (ref 21–32)
COLOR UR: ABNORMAL
CREAT SERPL-MCNC: 1.22 MG/DL (ref 0.5–1.3)
CREAT SERPL-MCNC: 1.22 MG/DL (ref 0.5–1.3)
CREAT UR-MCNC: 63.5 MG/DL (ref 20–370)
EGFRCR SERPLBLD CKD-EPI 2021: 55 ML/MIN/1.73M*2
EGFRCR SERPLBLD CKD-EPI 2021: 55 ML/MIN/1.73M*2
EOSINOPHIL # BLD AUTO: 0.04 X10*3/UL (ref 0–0.4)
EOSINOPHIL NFR BLD AUTO: 0.3 %
ERYTHROCYTE [DISTWIDTH] IN BLOOD BY AUTOMATED COUNT: 15.1 % (ref 11.5–14.5)
GLUCOSE SERPL-MCNC: 188 MG/DL (ref 74–99)
GLUCOSE SERPL-MCNC: 224 MG/DL (ref 74–99)
GLUCOSE UR STRIP.AUTO-MCNC: NORMAL MG/DL
HCT VFR BLD AUTO: 27.2 % (ref 41–52)
HGB BLD-MCNC: 8.8 G/DL (ref 13.5–17.5)
HOLD SPECIMEN: NORMAL
HOLD SPECIMEN: NORMAL
IMM GRANULOCYTES # BLD AUTO: 0.14 X10*3/UL (ref 0–0.5)
IMM GRANULOCYTES NFR BLD AUTO: 1.2 % (ref 0–0.9)
KETONES UR STRIP.AUTO-MCNC: NEGATIVE MG/DL
LEUKOCYTE ESTERASE UR QL STRIP.AUTO: NEGATIVE
LYMPHOCYTES # BLD AUTO: 1.7 X10*3/UL (ref 0.8–3)
LYMPHOCYTES NFR BLD AUTO: 14.8 %
MAGNESIUM SERPL-MCNC: 1.88 MG/DL (ref 1.6–2.4)
MCH RBC QN AUTO: 25 PG (ref 26–34)
MCHC RBC AUTO-ENTMCNC: 32.4 G/DL (ref 32–36)
MCV RBC AUTO: 77 FL (ref 80–100)
MONOCYTES # BLD AUTO: 0.84 X10*3/UL (ref 0.05–0.8)
MONOCYTES NFR BLD AUTO: 7.3 %
NEUTROPHILS # BLD AUTO: 8.73 X10*3/UL (ref 1.6–5.5)
NEUTROPHILS NFR BLD AUTO: 76.2 %
NITRITE UR QL STRIP.AUTO: NEGATIVE
NRBC BLD-RTO: 0 /100 WBCS (ref 0–0)
P AXIS: -89 DEGREES
P AXIS: 20 DEGREES
P OFFSET: 185 MS
P OFFSET: 193 MS
P OFFSET: 195 MS
P ONSET: 140 MS
P ONSET: 142 MS
P ONSET: 154 MS
PH UR STRIP.AUTO: 5.5 [PH]
PLATELET # BLD AUTO: 465 X10*3/UL (ref 150–450)
POTASSIUM SERPL-SCNC: 4.8 MMOL/L (ref 3.5–5.3)
POTASSIUM SERPL-SCNC: 5.1 MMOL/L (ref 3.5–5.3)
PR INTERVAL: 120 MS
PR INTERVAL: 146 MS
PR INTERVAL: 156 MS
PROT UR STRIP.AUTO-MCNC: NEGATIVE MG/DL
Q ONSET: 215 MS
Q ONSET: 216 MS
Q ONSET: 218 MS
QRS COUNT: 13 BEATS
QRS COUNT: 14 BEATS
QRS COUNT: 15 BEATS
QRS COUNT: 16 BEATS
QRS COUNT: 21 BEATS
QRS DURATION: 78 MS
QRS DURATION: 80 MS
QRS DURATION: 82 MS
QRS DURATION: 82 MS
QRS DURATION: 84 MS
QT INTERVAL: 304 MS
QT INTERVAL: 366 MS
QT INTERVAL: 396 MS
QT INTERVAL: 428 MS
QT INTERVAL: 432 MS
QTC CALCULATION(BAZETT): 443 MS
QTC CALCULATION(BAZETT): 459 MS
QTC CALCULATION(BAZETT): 488 MS
QTC CALCULATION(BAZETT): 489 MS
QTC CALCULATION(BAZETT): 509 MS
QTC FREDERICIA: 391 MS
QTC FREDERICIA: 426 MS
QTC FREDERICIA: 456 MS
QTC FREDERICIA: 469 MS
QTC FREDERICIA: 480 MS
R AXIS: -54 DEGREES
R AXIS: -59 DEGREES
R AXIS: -63 DEGREES
R AXIS: -71 DEGREES
R AXIS: 269 DEGREES
RBC # BLD AUTO: 3.52 X10*6/UL (ref 4.5–5.9)
RBC # UR STRIP.AUTO: NEGATIVE /UL
SODIUM SERPL-SCNC: 113 MMOL/L (ref 136–145)
SODIUM SERPL-SCNC: 118 MMOL/L (ref 136–145)
SODIUM UR-SCNC: 24 MMOL/L
SODIUM/CREAT UR-RTO: 38 MMOL/G CREAT
SP GR UR STRIP.AUTO: 1.04
T AXIS: -85 DEGREES
T AXIS: -86 DEGREES
T AXIS: 16 DEGREES
T AXIS: 246 DEGREES
T AXIS: 47 DEGREES
T OFFSET: 370 MS
T OFFSET: 398 MS
T OFFSET: 413 MS
T OFFSET: 430 MS
T OFFSET: 431 MS
UFH PPP CHRO-ACNC: 1.4 IU/ML
UFH PPP CHRO-ACNC: 1.6 IU/ML
UROBILINOGEN UR STRIP.AUTO-MCNC: NORMAL MG/DL
VENTRICULAR RATE: 128 BPM
VENTRICULAR RATE: 77 BPM
VENTRICULAR RATE: 85 BPM
VENTRICULAR RATE: 92 BPM
VENTRICULAR RATE: 95 BPM
WBC # BLD AUTO: 11.5 X10*3/UL (ref 4.4–11.3)

## 2025-01-31 PROCEDURE — 97116 GAIT TRAINING THERAPY: CPT | Mod: GP,CQ

## 2025-01-31 PROCEDURE — 2500000005 HC RX 250 GENERAL PHARMACY W/O HCPCS: Performed by: STUDENT IN AN ORGANIZED HEALTH CARE EDUCATION/TRAINING PROGRAM

## 2025-01-31 PROCEDURE — 2500000002 HC RX 250 W HCPCS SELF ADMINISTERED DRUGS (ALT 637 FOR MEDICARE OP, ALT 636 FOR OP/ED): Performed by: NURSE PRACTITIONER

## 2025-01-31 PROCEDURE — 94640 AIRWAY INHALATION TREATMENT: CPT

## 2025-01-31 PROCEDURE — 93005 ELECTROCARDIOGRAM TRACING: CPT

## 2025-01-31 PROCEDURE — 2500000004 HC RX 250 GENERAL PHARMACY W/ HCPCS (ALT 636 FOR OP/ED): Performed by: STUDENT IN AN ORGANIZED HEALTH CARE EDUCATION/TRAINING PROGRAM

## 2025-01-31 PROCEDURE — 80048 BASIC METABOLIC PNL TOTAL CA: CPT | Performed by: STUDENT IN AN ORGANIZED HEALTH CARE EDUCATION/TRAINING PROGRAM

## 2025-01-31 PROCEDURE — 2500000002 HC RX 250 W HCPCS SELF ADMINISTERED DRUGS (ALT 637 FOR MEDICARE OP, ALT 636 FOR OP/ED): Performed by: STUDENT IN AN ORGANIZED HEALTH CARE EDUCATION/TRAINING PROGRAM

## 2025-01-31 PROCEDURE — 83735 ASSAY OF MAGNESIUM: CPT | Performed by: STUDENT IN AN ORGANIZED HEALTH CARE EDUCATION/TRAINING PROGRAM

## 2025-01-31 PROCEDURE — 2500000001 HC RX 250 WO HCPCS SELF ADMINISTERED DRUGS (ALT 637 FOR MEDICARE OP): Performed by: STUDENT IN AN ORGANIZED HEALTH CARE EDUCATION/TRAINING PROGRAM

## 2025-01-31 PROCEDURE — 82570 ASSAY OF URINE CREATININE: CPT | Performed by: INTERNAL MEDICINE

## 2025-01-31 PROCEDURE — 2500000001 HC RX 250 WO HCPCS SELF ADMINISTERED DRUGS (ALT 637 FOR MEDICARE OP)

## 2025-01-31 PROCEDURE — 2500000001 HC RX 250 WO HCPCS SELF ADMINISTERED DRUGS (ALT 637 FOR MEDICARE OP): Performed by: NURSE PRACTITIONER

## 2025-01-31 PROCEDURE — 81003 URINALYSIS AUTO W/O SCOPE: CPT | Performed by: INTERNAL MEDICINE

## 2025-01-31 PROCEDURE — 82947 ASSAY GLUCOSE BLOOD QUANT: CPT

## 2025-01-31 PROCEDURE — 2550000001 HC RX 255 CONTRASTS: Performed by: HOSPITALIST

## 2025-01-31 PROCEDURE — 71260 CT THORAX DX C+: CPT

## 2025-01-31 PROCEDURE — 2500000002 HC RX 250 W HCPCS SELF ADMINISTERED DRUGS (ALT 637 FOR MEDICARE OP, ALT 636 FOR OP/ED): Performed by: HOSPITALIST

## 2025-01-31 PROCEDURE — 76770 US EXAM ABDO BACK WALL COMP: CPT | Performed by: RADIOLOGY

## 2025-01-31 PROCEDURE — 2500000002 HC RX 250 W HCPCS SELF ADMINISTERED DRUGS (ALT 637 FOR MEDICARE OP, ALT 636 FOR OP/ED): Performed by: INTERNAL MEDICINE

## 2025-01-31 PROCEDURE — 85025 COMPLETE CBC W/AUTO DIFF WBC: CPT | Performed by: STUDENT IN AN ORGANIZED HEALTH CARE EDUCATION/TRAINING PROGRAM

## 2025-01-31 PROCEDURE — 85520 HEPARIN ASSAY: CPT | Performed by: STUDENT IN AN ORGANIZED HEALTH CARE EDUCATION/TRAINING PROGRAM

## 2025-01-31 PROCEDURE — 93010 ELECTROCARDIOGRAM REPORT: CPT | Performed by: INTERNAL MEDICINE

## 2025-01-31 PROCEDURE — 84484 ASSAY OF TROPONIN QUANT: CPT | Performed by: STUDENT IN AN ORGANIZED HEALTH CARE EDUCATION/TRAINING PROGRAM

## 2025-01-31 PROCEDURE — 99232 SBSQ HOSP IP/OBS MODERATE 35: CPT | Performed by: HOSPITALIST

## 2025-01-31 PROCEDURE — 83935 ASSAY OF URINE OSMOLALITY: CPT | Mod: ELYLAB | Performed by: INTERNAL MEDICINE

## 2025-01-31 PROCEDURE — 99233 SBSQ HOSP IP/OBS HIGH 50: CPT | Performed by: INTERNAL MEDICINE

## 2025-01-31 PROCEDURE — 36415 COLL VENOUS BLD VENIPUNCTURE: CPT | Performed by: STUDENT IN AN ORGANIZED HEALTH CARE EDUCATION/TRAINING PROGRAM

## 2025-01-31 PROCEDURE — 80048 BASIC METABOLIC PNL TOTAL CA: CPT | Performed by: INTERNAL MEDICINE

## 2025-01-31 PROCEDURE — 76770 US EXAM ABDO BACK WALL COMP: CPT

## 2025-01-31 PROCEDURE — 87449 NOS EACH ORGANISM AG IA: CPT | Mod: ELYLAB | Performed by: NURSE PRACTITIONER

## 2025-01-31 PROCEDURE — 1200000002 HC GENERAL ROOM WITH TELEMETRY DAILY

## 2025-01-31 RX ORDER — TOLVAPTAN 15 MG/1
15 TABLET ORAL ONCE
Status: COMPLETED | OUTPATIENT
Start: 2025-01-31 | End: 2025-01-31

## 2025-01-31 RX ORDER — HEPARIN SODIUM 10000 [USP'U]/100ML
0-4000 INJECTION, SOLUTION INTRAVENOUS CONTINUOUS
Status: DISCONTINUED | OUTPATIENT
Start: 2025-01-31 | End: 2025-01-31

## 2025-01-31 RX ORDER — INSULIN LISPRO 100 [IU]/ML
0-5 INJECTION, SOLUTION INTRAVENOUS; SUBCUTANEOUS
Status: DISCONTINUED | OUTPATIENT
Start: 2025-02-01 | End: 2025-02-04

## 2025-01-31 RX ORDER — DEXTROSE 50 % IN WATER (D50W) INTRAVENOUS SYRINGE
12.5
Status: DISCONTINUED | OUTPATIENT
Start: 2025-01-31 | End: 2025-02-07 | Stop reason: HOSPADM

## 2025-01-31 RX ORDER — ISOSORBIDE MONONITRATE 60 MG/1
60 TABLET, EXTENDED RELEASE ORAL DAILY
Status: DISCONTINUED | OUTPATIENT
Start: 2025-01-31 | End: 2025-02-07 | Stop reason: HOSPADM

## 2025-01-31 RX ORDER — NITROGLYCERIN 0.4 MG/1
TABLET SUBLINGUAL
Status: COMPLETED
Start: 2025-01-31 | End: 2025-01-31

## 2025-01-31 RX ORDER — AZITHROMYCIN 250 MG/1
500 TABLET, FILM COATED ORAL
Status: COMPLETED | OUTPATIENT
Start: 2025-01-31 | End: 2025-02-02

## 2025-01-31 RX ORDER — NITROGLYCERIN 0.4 MG/1
0.4 TABLET SUBLINGUAL EVERY 5 MIN PRN
Status: DISCONTINUED | OUTPATIENT
Start: 2025-01-31 | End: 2025-02-07 | Stop reason: HOSPADM

## 2025-01-31 RX ORDER — DEXTROSE 50 % IN WATER (D50W) INTRAVENOUS SYRINGE
25
Status: DISCONTINUED | OUTPATIENT
Start: 2025-01-31 | End: 2025-02-07 | Stop reason: HOSPADM

## 2025-01-31 RX ADMIN — Medication 2 L/MIN: at 08:47

## 2025-01-31 RX ADMIN — AZITHROMYCIN DIHYDRATE 500 MG: 250 TABLET, FILM COATED ORAL at 13:14

## 2025-01-31 RX ADMIN — GUAIFENESIN 600 MG: 600 TABLET, EXTENDED RELEASE ORAL at 08:46

## 2025-01-31 RX ADMIN — TRAZODONE HYDROCHLORIDE 50 MG: 50 TABLET ORAL at 20:29

## 2025-01-31 RX ADMIN — IPRATROPIUM BROMIDE AND ALBUTEROL SULFATE 3 ML: 2.5; .5 SOLUTION RESPIRATORY (INHALATION) at 01:31

## 2025-01-31 RX ADMIN — STANDARDIZED SENNA CONCENTRATE 17.2 MG: 8.6 TABLET ORAL at 20:29

## 2025-01-31 RX ADMIN — METOPROLOL SUCCINATE 25 MG: 25 TABLET, EXTENDED RELEASE ORAL at 08:46

## 2025-01-31 RX ADMIN — BENZONATATE 200 MG: 100 CAPSULE ORAL at 20:29

## 2025-01-31 RX ADMIN — IPRATROPIUM BROMIDE AND ALBUTEROL SULFATE 3 ML: 2.5; .5 SOLUTION RESPIRATORY (INHALATION) at 19:44

## 2025-01-31 RX ADMIN — APIXABAN 5 MG: 5 TABLET, FILM COATED ORAL at 08:46

## 2025-01-31 RX ADMIN — TRAMADOL HYDROCHLORIDE 50 MG: 50 TABLET, FILM COATED ORAL at 03:27

## 2025-01-31 RX ADMIN — Medication 5 MG: at 20:29

## 2025-01-31 RX ADMIN — ISOSORBIDE MONONITRATE 60 MG: 60 TABLET, EXTENDED RELEASE ORAL at 11:20

## 2025-01-31 RX ADMIN — NITROGLYCERIN 0.4 MG: 0.4 TABLET SUBLINGUAL at 03:55

## 2025-01-31 RX ADMIN — TAMSULOSIN HYDROCHLORIDE 0.8 MG: 0.4 CAPSULE ORAL at 08:46

## 2025-01-31 RX ADMIN — IOHEXOL 75 ML: 350 INJECTION, SOLUTION INTRAVENOUS at 12:53

## 2025-01-31 RX ADMIN — IPRATROPIUM BROMIDE AND ALBUTEROL SULFATE 3 ML: 2.5; .5 SOLUTION RESPIRATORY (INHALATION) at 13:36

## 2025-01-31 RX ADMIN — NITROGLYCERIN 0.4 MG: 0.4 TABLET SUBLINGUAL at 04:01

## 2025-01-31 RX ADMIN — TOLVAPTAN 15 MG: 15 TABLET ORAL at 16:07

## 2025-01-31 RX ADMIN — TOLVAPTAN 15 MG: 15 TABLET ORAL at 22:36

## 2025-01-31 RX ADMIN — ANTACID TABLETS 500 MG: 500 TABLET, CHEWABLE ORAL at 03:27

## 2025-01-31 RX ADMIN — CEFTRIAXONE SODIUM 1 G: 1 INJECTION, SOLUTION INTRAVENOUS at 08:46

## 2025-01-31 RX ADMIN — FUROSEMIDE 20 MG: 10 INJECTION, SOLUTION INTRAMUSCULAR; INTRAVENOUS at 11:20

## 2025-01-31 RX ADMIN — VALSARTAN 40 MG: 80 TABLET, FILM COATED ORAL at 08:46

## 2025-01-31 RX ADMIN — Medication 1 L/MIN: at 19:48

## 2025-01-31 RX ADMIN — Medication 2 L/MIN: at 01:31

## 2025-01-31 RX ADMIN — GUAIFENESIN 600 MG: 600 TABLET, EXTENDED RELEASE ORAL at 20:29

## 2025-01-31 RX ADMIN — APIXABAN 5 MG: 5 TABLET, FILM COATED ORAL at 20:29

## 2025-01-31 RX ADMIN — ATORVASTATIN CALCIUM 40 MG: 20 TABLET, FILM COATED ORAL at 20:29

## 2025-01-31 ASSESSMENT — COGNITIVE AND FUNCTIONAL STATUS - GENERAL
STANDING UP FROM CHAIR USING ARMS: A LITTLE
HELP NEEDED FOR BATHING: A LITTLE
MOBILITY SCORE: 15
MOVING TO AND FROM BED TO CHAIR: A LITTLE
DRESSING REGULAR LOWER BODY CLOTHING: A LITTLE
TOILETING: A LITTLE
STANDING UP FROM CHAIR USING ARMS: A LITTLE
WALKING IN HOSPITAL ROOM: A LITTLE
MOVING FROM LYING ON BACK TO SITTING ON SIDE OF FLAT BED WITH BEDRAILS: A LITTLE
STANDING UP FROM CHAIR USING ARMS: A LITTLE
WALKING IN HOSPITAL ROOM: A LITTLE
DRESSING REGULAR LOWER BODY CLOTHING: A LITTLE
CLIMB 3 TO 5 STEPS WITH RAILING: TOTAL
DAILY ACTIVITIY SCORE: 20
PERSONAL GROOMING: A LITTLE
MOVING FROM LYING ON BACK TO SITTING ON SIDE OF FLAT BED WITH BEDRAILS: A LITTLE
TOILETING: A LITTLE
MOVING FROM LYING ON BACK TO SITTING ON SIDE OF FLAT BED WITH BEDRAILS: A LITTLE
DRESSING REGULAR UPPER BODY CLOTHING: A LITTLE
MOBILITY SCORE: 19
WALKING IN HOSPITAL ROOM: A LITTLE
CLIMB 3 TO 5 STEPS WITH RAILING: A LITTLE
DAILY ACTIVITIY SCORE: 18
EATING MEALS: A LITTLE
DRESSING REGULAR UPPER BODY CLOTHING: A LITTLE
MOVING TO AND FROM BED TO CHAIR: A LITTLE
CLIMB 3 TO 5 STEPS WITH RAILING: A LITTLE
MOBILITY SCORE: 19
HELP NEEDED FOR BATHING: A LITTLE
MOVING TO AND FROM BED TO CHAIR: A LITTLE
TURNING FROM BACK TO SIDE WHILE IN FLAT BAD: A LOT

## 2025-01-31 ASSESSMENT — ENCOUNTER SYMPTOMS
SHORTNESS OF BREATH: 1
ACTIVITY CHANGE: 0
ARTHRALGIAS: 0
ABDOMINAL DISTENTION: 0
DIFFICULTY URINATING: 0
DIZZINESS: 0
EYE DISCHARGE: 0
AGITATION: 0
ADENOPATHY: 0

## 2025-01-31 ASSESSMENT — PAIN - FUNCTIONAL ASSESSMENT
PAIN_FUNCTIONAL_ASSESSMENT: 0-10

## 2025-01-31 ASSESSMENT — PAIN SCALES - GENERAL
PAINLEVEL_OUTOF10: 0 - NO PAIN
PAINLEVEL_OUTOF10: 0 - NO PAIN
PAINLEVEL_OUTOF10: 7
PAINLEVEL_OUTOF10: 5 - MODERATE PAIN
PAINLEVEL_OUTOF10: 3
PAINLEVEL_OUTOF10: 9

## 2025-01-31 NOTE — PROGRESS NOTES
"Chris Russell is a 89 y.o. male on day 4 of admission presenting with sitting in chair appears comfortable       Subjective   Feeling better, had some chest pain last night that has now resolved.       Objective     Last Recorded Vitals  /61 (Patient Position: Sitting)   Pulse 69   Temp 35.6 °C (96.1 °F)   Resp 19   Ht 1.88 m (6' 2\")   Wt 104 kg (229 lb 8 oz)   SpO2 97%   BMI 29.47 kg/m²      Intake/Output last 3 Shifts:    Intake/Output Summary (Last 24 hours) at 1/31/2025 1207  Last data filed at 1/31/2025 0700  Gross per 24 hour   Intake 360 ml   Output 1200 ml   Net -840 ml       Scheduled medications  apixaban, 5 mg, oral, BID  atorvastatin, 40 mg, oral, Nightly  azithromycin, 500 mg, oral, q24h FREDDY  [START ON 2/1/2025] cefTRIAXone, 2 g, intravenous, q24h  furosemide, 20 mg, intravenous, q12h  guaiFENesin, 600 mg, oral, BID  ipratropium-albuteroL, 3 mL, nebulization, q6h  isosorbide mononitrate ER, 60 mg, oral, Daily  metoprolol succinate XL, 25 mg, oral, Daily  oxygen, , inhalation, Continuous - Inhalation  polyethylene glycol, 17 g, oral, Daily  sennosides, 2 tablet, oral, Nightly  tamsulosin, 0.8 mg, oral, Daily  traZODone, 50 mg, oral, Nightly  valsartan, 40 mg, oral, Daily      Continuous medications     PRN medications  PRN medications: acetaminophen **OR** acetaminophen **OR** acetaminophen, albuterol, benzonatate, calcium carbonate, melatonin, nitroglycerin, ondansetron, traMADol, traZODone    Physical Exam   Gen: NAD  HEENT: EOM, MMM  CV: RRR, no murmurs rubs or gallops  Resp: coarse rhonchi b/l   Abdomen: soft, NT,+BS  LE: 1+ pitting edema b/l     Relevant Results  Lab Results   Component Value Date    WBC 11.5 (H) 01/31/2025    HGB 8.8 (L) 01/31/2025    HCT 27.2 (L) 01/31/2025    MCV 77 (L) 01/31/2025     (H) 01/31/2025     Lab Results   Component Value Date    GLUCOSE 188 (H) 01/31/2025    CALCIUM 7.6 (L) 01/31/2025     (LL) 01/31/2025    K 4.8 01/31/2025    CO2 23 " 01/31/2025    CL 85 (L) 01/31/2025    BUN 24 (H) 01/31/2025    CREATININE 1.22 01/31/2025     Lab Results   Component Value Date    HGBA1C 6.3 (H) 12/20/2024           Assessment/Plan  89 year old male admitted with acute hypoxic respiratory failure secondary to community aquired pneumonia and new ischemic cardiomyopathy with EF of 35% and hyponatremia    -wean o2 as tolerated, procal elevated, BC NGTD  -will get CT chest further evaluate, now withy hyponatremia    Reduced EF CHF exacerbation  -echo with EF 35%, seen by cardiology, C deferred, spoke with daughter.   -continue medical management for now  -on IV lasix 20mg BID    Hyponatremia: CT chest as above, nephrology consulted    DMII: continue current therapy     Hx of pulmonary embolism: continue eliquis    Ambulatory dysfunction: PT/OT ambulating, like home with home care     DVT ppx: vanessa Perez MD

## 2025-01-31 NOTE — PROGRESS NOTES
Physical Therapy    Physical Therapy Treatment    Patient Name: Chris Russlel  MRN: [id number]  Today's Date: 1/31/2025  Time Calculation  Start Time: 1019  Stop Time: 1035  Time Calculation (min): 16 min     1009/1009-A    Assessment/Plan   PT Assessment  PT Assessment Results: Decreased endurance, Impaired balance, Decreased mobility  Rehab Prognosis: Good  Barriers to Discharge Home: No anticipated barriers  Treatment Tolerance: Patient limited by fatigue  Medical Staff Made Aware: Yes  Strengths: Support of Caregivers, Living arrangement secure, Housing layout  Barriers to Participation: Comorbidities  End of Session Communication: Bedside nurse, PCT/NA/CTA  Assessment Comment: Patient continues to require (A) for safety with transfers/amb. Patient states he does not like the ww and prefers to use his cane for support with amb. Patient will benefit from additional PT to address deficits and improve mobility.  End of Session Patient Position: Up in chair, Alarm on (Reclined in chair with LEs elevated; Call light, phone, and tray table within reach.)  PT Plan  Inpatient/Swing Bed or Outpatient: Inpatient  Treatment/Interventions: Transfer training, Gait training, Therapeutic exercise  PT Plan: Ongoing PT  PT Frequency: 3 times per week  PT Discharge Recommendations: Low intensity level of continued care    PT Recommended Transfer Status: Assist x1, Assistive device    General Visit Information:   PT  Visit  PT Received On: 01/31/25  General  Family/Caregiver Present: No  Prior to Session Communication: Bedside nurse  Patient Position Received: Up in chair, Alarm on  General Comment: Nursing cleared patient for treatment after early morning chest pain resolved. Possible cardiac cath, but patient declined. Patient agreeable to therapy.    General Observations:   General Observation: Tele; 2L O2 via NC; Alarm; (B)LE edema with (L)LE wrapped with gauze bandage and (R)LE seeping fluid laterally, near shin(nursing  informed/aware).    Subjective     Precautions:  Precautions  Hearing/Visual Limitations: [place], B/L hearing aides.  Medical Precautions: Fall precautions  Precautions Comment: Per EMR: High fall risk    Vital Signs:  Vital Signs  Heart Rate:  (67-121bpm during session.)  SpO2:  (2L O2 via NC. SPO2 98% at rest and 92% with activities. c/o SOB with increased amb, but no c/o chest pain. Nursing aware of readings.)    Objective     Pain:  Pain Assessment  Pain Assessment: 0-10  0-10 (Numeric) Pain Score: 0 - No pain    Cognition:  Cognition  Overall Cognitive Status: Within Functional Limits (Increased time provided to give responses([place]).)    Balance:   Static Sitting Balance  Static Sitting-Comment/Number of Minutes: G-  Dynamic Sitting Balance  Dynamic Sitting-Comments: G-  Static Standing Balance  Static Standing-Comment/Number of Minutes: F with ww  Dynamic Standing Balance  Dynamic Standing-Comments: F- with ww    Treatments:              Ambulation/Gait Training  Ambulation/Gait Training Performed: Yes  Ambulation/Gait Training 1  Surface 1: Level tile  Device 1: Rolling walker  Gait Support Devices: Gait belt  Assistance 1: Minimum assistance  Comments/Distance (ft) 1: ~50ft x2. WBOS. Slow ravinder. Forward flexed posture. Step through gait pattern. Decreased step height/length B, feet everted. v/c and (A) for safer maneuvering of ww with 90/180° turns and obstacles in pathway. Patient frequently needing v/c and (A) to keep ww closer to his body since he tends to step outside the [place] of ww. Increased fatigue reported with increased amb. No c/o pain, just SOB. Patient states he does not like using the ww, prefers to use his cane for support with amb.  Transfers  Transfer: Yes  Transfer 1  Technique 1: Sit to stand, Stand to sit  Transfer Device 1: Gait belt (ww)  Transfer Level of Assistance 1: Contact guard  Trials/Comments 1: (2x). v/c for safe hand placement and technique. Slow transition of hands to/from ww.  Benefits from elevated surfaces.             Outcome Measures:     [facility] Basic Mobility  Turning from your back to your side while in a flat bed without using bedrails: A little  Moving from lying on your back to sitting on the side of a flat bed without using bedrails: A lot  Moving to and from bed to chair (including a wheelchair): A little  Standing up from a chair using your arms (e.g. wheelchair or bedside chair): A little  To walk in hospital room: A little  Climbing 3-5 steps with railing: Total  Basic Mobility - Total Score: 15                                      Education Documentation  Mobility Training, taught by Brianne Alvarado PTA at 1/31/2025 11:51 AM.  Learner: Patient  Readiness: Acceptance  Method: Explanation, Teach-back, Demonstration  Response: Verbalizes Understanding, Needs Reinforcement  Comment: See therapy note.           EDUCATION:  Individual(s) Educated: Patient  Education Provided: Body Mechanics, Fall Risk, Home Safety, POC, Posture (Balance; Safety with transfers/amb; Safe use/positioning of ww with amb.)  Patient Response to Education: Patient/Caregiver Verbalized Understanding of Information (However, carry-over is limited.)    Encounter Problems       Encounter Problems (Active)       PT Problem       Pt. will transfer supine/sit with MOD I (Progressing)       Start:  01/28/25    Expected End:  02/11/25            Pt. will transfer sit/stand with safest and least restrictive assistive device with MOD I  (Progressing)       Start:  01/28/25    Expected End:  02/11/25            Pt.will ambulate 60'' with safest and least restrictive assistive device with MOD I  (Progressing)       Start:  01/28/25    Expected End:  02/11/25            Pt. will amb up/down 2 steps with HR and cane with CGA  (Not Progressing)       Start:  01/28/25    Expected End:  02/11/25            Pt. will perform 2 x 15 B LE AROM exercises  (Not Progressing)       Start:  01/28/25    Expected End:  02/11/25

## 2025-01-31 NOTE — SIGNIFICANT EVENT
Notified by nursing due to patient having 9 out of 10 substernal chest pain.  EKG obtained and appears to show worsening ST depression in inferior leads (picture uploaded to media tab in chart, was unable to upload to usual EKG section).  Chest pain reduced to 3 out of 10 following two doses of sublingual nitroglycerin.  Given his apparently new onset heart failure this is concerning for ischemic etiology.  Will hold his Eliquis and start heparin infusion with full bolus - to be timed for 12 hours after last Eliquis dose per pharmacy recommendations.  Cardiology team is following.  Will also make him n.p.o. in case of possible cardiac catheterization.  Will obtain serial EKGs in the event of recurrent chest pain.    Gibran Ibarra MD

## 2025-01-31 NOTE — PROGRESS NOTES
Formerly [facility] Heart Progress Note           Rounding JW/Cardiologist:  Griffin Gardner, APRN-CNP, Dr. Venkata Stubbs  Primary Cardiologist: Dr. Venkata Stubbs    Date:  1/31/2025  Patient:  Chris Russell  YOB: 1930  MRN:  [id number]   Admit Date:  1/27/2025      SUBJECTIVE:    1/31/25  Patient states that last night he did have an episode where he felt chest pain,.  He states that this morning he feels much better I spoke to him at length I did offer him a heart catheterization today he absolutely refuses to have any invasive testing.  I did talk to his daughter Ayala and inform her that he absolutely does not want any further cardiac testing she is aware and she is going to talk to him.  Currently sitting up in chair he states he is feeling much better and answered all of his questions  EKG shows some ST changes inferior depression    CONCLUSIONS:   1. The left ventricular systolic function is moderately decreased, with a visually estimated ejection fraction of 35%.   2. There is global hypokinesis of the left ventricle with minor regional variations.   3. Spectral Doppler shows a Grade II (pseudonormal pattern) of left ventricular diastolic filling with an elevated left atrial pressure.   4. There is normal right ventricular global systolic function.   5. Right ventricle is upper limits of normal in size.   6. There is no evidence of mitral valve stenosis.   7. Mild mitral valve regurgitation.   8. Mild tricuspid regurgitation is visualized.   9. Mild aortic valve stenosis.  10. Pulmonary hypertension is present.  11. Severely elevated pulmonary artery pressure.  12. The inferior vena cava appears mildly dilated.      VITALS:     Vitals:    01/31/25 0403 01/31/25 0411 01/31/25 0710 01/31/25 0803   BP: (!) 126/95 136/64  108/61   Patient Position:    Sitting   Pulse:  100  69   Resp:    19   Temp: 36.3 °C (97.3 °F) 36.2 °C (97.2 °F)  35.6 °C (96.1 °F)   TempSrc:       SpO2: 94% 93% 99% 97%    Weight:       Height:           Intake/Output Summary (Last 24 hours) at 1/31/2025 0948  Last data filed at 1/31/2025 0700  Gross per 24 hour   Intake 460 ml   Output 1400 ml   Net -940 ml       Wt Readings from Last 4 Encounters:   01/31/25 104 kg (229 lb 8 oz)   11/12/24 97.5 kg (215 lb)   08/13/24 97.6 kg (215 lb 3.2 oz)   05/14/24 96.3 kg (212 lb 6.4 oz)       CURRENT HOSPITAL MEDICATIONS:   apixaban, 5 mg, oral, BID  atorvastatin, 40 mg, oral, Nightly  cefTRIAXone, 1 g, intravenous, q24h  furosemide, 20 mg, intravenous, q12h  guaiFENesin, 600 mg, oral, BID  ipratropium-albuteroL, 3 mL, nebulization, q6h  isosorbide mononitrate ER, 60 mg, oral, Daily  metoprolol succinate XL, 25 mg, oral, Daily  oxygen, , inhalation, Continuous - Inhalation  polyethylene glycol, 17 g, oral, Daily  sennosides, 2 tablet, oral, Nightly  tamsulosin, 0.8 mg, oral, Daily  traZODone, 50 mg, oral, Nightly  valsartan, 40 mg, oral, Daily         Current Outpatient Medications   Medication Instructions    albuterol (Ventolin HFA) 90 mcg/actuation inhaler 2 puffs, inhalation, Every 4 hours PRN    apixaban (Eliquis DVT-PE Treat 30D Start) 5 mg (74 tabs) tablets,dose pack 2 tablets, Every 12 hours    atorvastatin (LIPITOR) 40 mg, oral, Daily    benzocaine-menthoL (Cepacol Sore Throat, lyndsey-men,) 15-2.3 mg lozenge Place into mouth between cheek and gum.    Eliquis 5 mg, oral, 2 times daily    multivitamin capsule 1 capsule, Daily RT    tamsulosin (FLOMAX) 0.8 mg, oral, Daily    traZODone (DESYREL) 100 mg, oral, Nightly PRN    traZODone (DESYREL) 50 mg, oral, Nightly        PHYSICAL EXAMINATION:   GENERAL:  Well developed, well nourished, in no acute distress.  Hard of hearing  CHEST:  Symmetric and nontender.  NEURO/PSYCH:  Alert and oriented times three with approppriate behavior and responses.  NECK:  Supple, no JVD, no bruit.  LUNGS:  scattered rales  HEART: 1, S2 irregular  EXTREMITIES:  Warm with good color, no clubbing or cyanosis.   Trace edema  PERIPHERAL VASCULAR:  Pulses present and equally palpable; 1+ throughout.      LAB DATA:     CBC:   Results from last 7 days   Lab Units 01/31/25 0459 01/30/25  0604 01/29/25  0539   WBC AUTO x10*3/uL 11.5* 10.7 12.6*   RBC AUTO x10*6/uL 3.52* 3.71* 3.76*   HEMOGLOBIN g/dL 8.8* 9.4* 9.6*   HEMATOCRIT % 27.2* 29.0* 30.6*   MCV fL 77* 78* 81   MCH pg 25.0* 25.3* 25.5*   MCHC g/dL 32.4 32.4 31.4*   RDW % 15.1* 15.3* 15.8*   PLATELETS AUTO x10*3/uL 465* 478* 509*     CMP:    Results from last 7 days   Lab Units 01/31/25  0459 01/30/25  0604 01/29/25  0539 01/28/25  0614 01/27/25  1436   SODIUM mmol/L 118* 123* 126*   < > 136   POTASSIUM mmol/L 4.8 4.7 4.5   < > 4.4   CHLORIDE mmol/L 85* 89* 94*   < > 98   CO2 mmol/L 23 23 23   < > 27   BUN mg/dL 24* 27* 22   < > 32*   CREATININE mg/dL 1.22 1.30 1.21   < > 1.53*   GLUCOSE mg/dL 188* 138* 181*   < > 164*   PROTEIN TOTAL g/dL  --   --   --   --  7.3   CALCIUM mg/dL 7.6* 7.9* 8.6   < > 9.2   BILIRUBIN TOTAL mg/dL  --   --   --   --  0.4   ALK PHOS U/L  --   --   --   --  139*   AST U/L  --   --   --   --  71*   ALT U/L  --   --   --   --  124*    < > = values in this interval not displayed.     BMP:    Results from last 7 days   Lab Units 01/31/25 0459 01/30/25  0604 01/29/25  0539   SODIUM mmol/L 118* 123* 126*   POTASSIUM mmol/L 4.8 4.7 4.5   CHLORIDE mmol/L 85* 89* 94*   CO2 mmol/L 23 23 23   BUN mg/dL 24* 27* 22   CREATININE mg/dL 1.22 1.30 1.21   CALCIUM mg/dL 7.6* 7.9* 8.6   GLUCOSE mg/dL 188* 138* 181*     Magnesium:  Results from last 7 days   Lab Units 01/31/25  0459 01/30/25  0604 01/29/25  0539   MAGNESIUM mg/dL 1.88 2.00 2.05     Troponin:    Results from last 7 days   Lab Units 01/31/25  0459 01/27/25  1601 01/27/25  1436   TROPHS ng/L 232* 19 23*     BNP:   Results from last 7 days   Lab Units 01/29/25  1253   BNP pg/mL 2,465*     Lipid Panel:         DIAGNOSTIC TESTING:   @No results found for this or any previous visit.    ECG 12  Lead    Result Date: 1/27/2025  Sinus rhythm with Blocked Premature atrial complexes with occasional Premature ventricular complexes Low voltage QRS Left anterior fascicular block Possible Anterolateral infarct (cited on or before 20-JUN-2023) Abnormal ECG When compared with ECG of 20-JUN-2023 02:35, Premature ventricular complexes are now Present Premature atrial complexes are now Present Left anterior fascicular block is now Present Questionable change in initial forces of Anterolateral leads    XR chest 1 view    Result Date: 1/27/2025  Interpreted By:  Darrion Crowley, STUDY: XR CHEST 1 VIEW  1/27/2025 2:25 pm   INDICATION: Signs/Symptoms:AMS   COMPARISON: 06/04/2024   ACCESSION NUMBER(S): [id number]   ORDERING CLINICIAN: PAL THONRTON   TECHNIQUE: A single AP portable radiograph of the chest was obtained.   FINDINGS: Multiple cardiac monitoring leads are seen over the chest.  Bibasilar airspace consolidations are seen and may represent small pleural effusions, atelectasis and/or pneumonia. No pneumothorax is identified. The cardiac silhouette is within normal limits for size.       Bibasilar airspace opacities, as above. Clinical correlation and continued follow-up until clearing is recommended.   MACRO: None.   Signed by: Darrion Crowley 1/27/2025 2:37 PM Dictation workstation:   [id number]    CT head wo IV contrast    Result Date: 1/27/2025  Interpreted By:  Darrion Crowley, STUDY: CT HEAD WO IV CONTRAST; 1/27/2025 2:22 pm   INDICATION: Signs/Symptoms:AMS.   COMPARISON: None.   ACCESSION NUMBER(S): [id number]   ORDERING CLINICIAN: PAL THORNTON   TECHNIQUE: Contiguous axial CT images were obtained through the head at 5 mm slice thickness without contrast administration.   FINDINGS: INTRACRANIAL: The ventricles, sulci and basal cisterns are within normal limits for size and configuration. The grey-white differentiation is intact. There is no mass effect or midline shift. There is no extraaxial fluid  collection. There is no intracranial hemorrhage.  The calvarium is unremarkable.   EXTRACRANIAL: Visualized paranasal sinuses and mastoids are clear.       No evidence of acute cortical infarct or intracranial hemorrhage.   MACRO: None     Signed by: Darrion Crowley 1/27/2025 2:36 PM Dictation workstation:   [id number]       Transthoracic Echo (TTE) Complete   Final Result      XR chest 1 view   Final Result   Bibasilar airspace opacities, as above. Clinical correlation and   continued follow-up until clearing is recommended.        MACRO:   None.        Signed by: Darrion Crowley 1/27/2025 2:37 PM   Dictation workstation:   [id number]      CT head wo IV contrast   Final Result   No evidence of acute cortical infarct or intracranial hemorrhage.        MACRO:   None             Signed by: Darrion Crowley 1/27/2025 2:36 PM   Dictation workstation:   [id number]          Transthoracic Echo (TTE) Complete    Result Date: 1/30/2025          [place] [id number]  Tel [phone] Fax [phone] TRANSTHORACIC ECHOCARDIOGRAM REPORT Patient Name:       MCKENZIE Marr Physician:    [id number] Michael Felipe DO Study Date:         1/30/2025           Ordering Provider:    [id number] NIKHIL BANEGAS MRN/PID:            [id number]            Fellow: Accession#:         [id number]        Nurse: Date of Birth/Age:  1/25/1930 / 89      Sonographer:          Sisi MCMULLEN Gender Assigned at  M                   Additional Staff: Birth: Height:             187.96 cm           Admit Date:           1/27/2025 Weight:             99.79 kg            Admission Status:     Inpatient -                                                               Routine BSA / BMI:          2.26 m2 / 28.25     [facility]  [facility]                     kg/m2                                     Echo Lab Blood Pressure: 116 /74 mmHg Study Type:    TRANSTHORACIC ECHO (TTE) COMPLETE Diagnosis/ICD: Shortness of breath-R06.02 Indication:    SOB, PNEUMONIA CPT Codes:     Echo Complete w Full Doppler-[id number] Patient History: Diabetes:          Yes Pertinent History: Hyperlipidemia, COLE and Dyspnea. Study Detail: The following Echo studies were performed: 2D, M-Mode, Doppler and               color flow. Technically challenging study due to prominent lung               artifact. Definity used as a contrast agent for endocardial border               definition. Total contrast used for this procedure was 2 mL via IV               push. The patient was awake.  PHYSICIAN INTERPRETATION: Left Ventricle: The left ventricular systolic function is moderately decreased, with a visually estimated ejection fraction of 35%. There is global hypokinesis of the left ventricle with minor regional variations. The left ventricular cavity size is normal. There is mild increased septal and mildly increased posterior left ventricular wall thickness. Spectral Doppler shows a Grade II (pseudonormal pattern) of left ventricular diastolic filling with an elevated left atrial pressure. Left Atrium: The left atrial size is mildly dilated. Right Ventricle: The right ventricle is upper limits of normal in size. There is normal right ventricular global systolic function. Right Atrium: The right atrial size is mildly dilated. Aortic Valve: The aortic valve appears structurally normal. There is mild to moderate aortic valve cusp calcification. There is evidence of mild aortic valve stenosis. The aortic valve dimensionless index is 0.53. There is no evidence of aortic valve regurgitation. The peak instantaneous gradient of the aortic valve is 9 mmHg. The mean gradient of the aortic valve is 4 mmHg. Mitral Valve: The mitral valve is normal in structure. There is  no evidence of mitral valve stenosis. There is normal mitral valve leaflet mobility. There is mild mitral annular calcification. There is mild mitral valve regurgitation. Tricuspid Valve: The tricuspid valve is structurally normal. There is normal tricuspid valve leaflet mobility. There is mild tricuspid regurgitation. Pulmonic Valve: The pulmonic valve is structurally normal. There is no indication of pulmonic valve regurgitation. Pericardium: Trivial to small pericardial effusion. The effusion is circumferential. Aorta: The aortic root is normal. Pulmonary Artery: Pulmonary hypertension is present. The tricuspid regurgitant velocity is 3.88 m/s, and with an estimated right atrial pressure of 15 mmHg, the estimated pulmonary artery pressure is severely elevated with the RVSP at 75.2 mmHg. Systemic Veins: The inferior vena cava appears mildly dilated. In comparison to the previous echocardiogram(s): There are no prior studies on this patient for comparison purposes.  CONCLUSIONS:  1. The left ventricular systolic function is moderately decreased, with a visually estimated ejection fraction of 35%.  2. There is global hypokinesis of the left ventricle with minor regional variations.  3. Spectral Doppler shows a Grade II (pseudonormal pattern) of left ventricular diastolic filling with an elevated left atrial pressure.  4. There is normal right ventricular global systolic function.  5. Right ventricle is upper limits of normal in size.  6. There is no evidence of mitral valve stenosis.  7. Mild mitral valve regurgitation.  8. Mild tricuspid regurgitation is visualized.  9. Mild aortic valve stenosis. 10. Pulmonary hypertension is present. 11. Severely elevated pulmonary artery pressure. 12. The inferior vena cava appears mildly dilated. RECOMMENDATIONS: Technically suboptimal and limited study, therefore accuracy of above interpretation could be substantially diminished. Clinical correlation is advised. Consider  additional imaging modalities if clinically indicated.  QUANTITATIVE DATA SUMMARY:  2D MEASUREMENTS:             Normal Ranges: Ao Root d:       3.70 cm     (2.0-3.7cm) LAs:             4.60 cm     (2.7-4.0cm) IVSd:            1.00 cm     (0.6-1.1cm) LVPWd:           0.90 cm     (0.6-1.1cm) LVIDd:           5.80 cm     (3.9-5.9cm) LVIDs:           4.80 cm LV Mass Index:   96.4 g/m2 LVEDV Index:     98.41 ml/m2 LV % FS          17.2 %  LEFT ATRIUM:                  Normal Ranges: LA Vol A4C:        91.5 ml    (22+/-6mL/m2) LA Vol A2C:        94.8 ml LA Vol BP:         97.9 ml LA Vol Index A4C:  40.4ml/m2 LA Vol Index A2C:  41.9 ml/m2 LA Vol Index BP:   43.3 ml/m2 LA Area A4C:       28.3 cm2 LA Area A2C:       27.4 cm2 LA Major Axis A4C: 7.4 cm LA Major Axis A2C: 6.7 cm LA Volume Index:   41.2 ml/m2  RIGHT ATRIUM:                 Normal Ranges: RA Vol A4C:        58.1 ml    (8.3-19.5ml) RA Vol Index A4C:  25.7 ml/m2 RA Area A4C:       21.8 cm2 RA Major Axis A4C: 7.0 cm  AORTA MEASUREMENTS:         Normal Ranges: Asc Ao, d:          3.10 cm (2.1-3.4cm)  LV SYSTOLIC FUNCTION:                      Normal Ranges: EF-A4C View:    40 % (>=55%) EF-A2C View:    36 % EF-Biplane:     38 % EF-Visual:      35 % LV EF Reported: 35 %  LV DIASTOLIC FUNCTION:           Normal Ranges: MV Peak E:             1.32 m/s  (0.7-1.2 m/s) MV Peak A:             0.91 m/s  (0.42-0.7 m/s) E/A Ratio:             1.45      (1.0-2.2) MV e'                  0.100 m/s (>8.0) MV lateral e'          0.12 m/s MV medial e'           0.08 m/s E/e' Ratio:            13.19     (<8.0)  MITRAL VALVE:          Normal Ranges: MV DT:        121 msec (150-240msec)  AORTIC VALVE:                     Normal Ranges: AoV Vmax:                1.53 m/s (<=1.7m/s) AoV Peak P.4 mmHg (<20mmHg) AoV Mean P.0 mmHg (1.7-11.5mmHg) LVOT Max Benja:            0.82 m/s (<=1.1m/s) AoV VTI:                 28.90 cm (18-25cm) LVOT VTI:                 15.40 cm LVOT Diameter:           2.00 cm  (1.8-2.4cm) AoV Area, VTI:           1.67 cm2 (2.5-5.5cm2) AoV Area,Vmax:           1.67 cm2 (2.5-4.5cm2) AoV Dimensionless Index: 0.53  RIGHT VENTRICLE: RV Basal 3.90 cm RV Mid   3.40 cm RV Major 9.2 cm TAPSE:   20.7 mm RV s'    0.18 m/s  TRICUSPID VALVE/RVSP:          Normal Ranges: Peak TR Velocity:     3.88 m/s RV Syst Pressure:     75 mmHg  (< 30mmHg) IVC Diam:             1.90 cm  PULMONIC VALVE:          Normal Ranges: PV Accel Time:  92 msec  (>120ms) PV Max Benja:     1.0 m/s  (0.6-0.9m/s) PV Max P.0 mmHg  [id number] Michael Felipe DO Electronically signed on 2025 at 10:14:55 AM  ** Final **      RADIOLOGY:     Transthoracic Echo (TTE) Complete   Final Result      XR chest 1 view   Final Result   Bibasilar airspace opacities, as above. Clinical correlation and   continued follow-up until clearing is recommended.        MACRO:   None.        Signed by: Darrion Crowley 2025 2:37 PM   Dictation workstation:   [id number]      CT head wo IV contrast   Final Result   No evidence of acute cortical infarct or intracranial hemorrhage.        MACRO:   None             Signed by: Darrion Crowley 2025 2:36 PM   Dictation workstation:   [id number]          PROBLEM LIST     Patient Active Problem List   Diagnosis    Mixed hyperlipidemia    Benign prostatic hyperplasia with urinary frequency    AK (actinic keratosis)    Vertigo    Bilateral tinnitus    Controlled type 2 diabetes mellitus without complication, with long-term current use of insulin (Multi)    Obstructive sleep apnea syndrome    Bilateral hearing loss    Type 2 diabetes mellitus with hyperglycemia, without long-term current use of insulin    Community acquired pneumonia, unspecified laterality       ASSESSMENT:   Community-acquired pneumonia  Acute systolic heart failure NYHA class III EF 35%  Hyponatremia  Hypertension  Dyslipidemia  COLE  History of PE currently on Eliquis  BPH  DM  New Onset atrial  flutter      PLAN:     Patient seen and examined in conjunction with QING Rod and agree with the evaluation as noted above.  I have personally interviewed and examined the patient.   I have personally and independently reviewed the pertinentlabs and diagnostic testing.  I have personally verified the elements of the history and physical listed above and changes, if any, are noted below.     89-year-old gentleman with history of CAD, hypertension, dyslipidemia, COLE as well as multiple PVCs who presented increased shortness of breath and fatigue for which she had an echocardiogram that showed moderate LV dysfunction with EF of about 35% for which cardiology was consulted.  He had a remote history of cardiac catheterization that showed an LAD lesion for which the patient opted for medical management only.  Patient denied any ongoing chest pain but he had increased shortness of breath and was found to have recurrent acquired pneumonia for which she is currently on antibiotics.  He denies orthopnea proximal nocturnal dyspnea.  His initial labs showed BNP of greater than 2400, with a borderline mildly elevated troponin at 23.  EKG shows sinus rhythm with occasional PVCs and low voltage QRS, but no acute ST-T wave changes.     Agree with exam as noted above.  Cardiac exam reveals regular.  Second heart sound with frequent PACs and a soft 2/6 systolic murmur at the left sternal border.  There is reduced breath sounds in the lung bases bilaterally.  There is 2+ bilateral ankle edema.     ASSESSMENT AND PLAN:  New onset cardiomyopathy: Unclear whether this is ischemic or nonischemic, probably mixed, but patient declined to have invasive evaluation with cardiac catheterization and opts for medical management.  We will initiate GDMT with beta-blockers and ARB and uptitrate as blood pressure tolerates.  Will hold off on Aldactone at this time because of a increased risk of hyperkalemia in view of his CKD and other  comorbidities.  Will continue with gentle diuresis and transition to p.o. Lasix prior to discharge.  Community acquired pneumonia: Currently on IV antibiotics as noted above, will defer to primary team for continued management.  History of CAD: As reported by the patient with no definite records to confirm, but we will continue with current medical management since patient does decline invasive cardiac catheterization.    1/31/25  Tele monitoring  Spoke at length with patient he is refusing heart catheterization at this time.  I did call his daughter Ange and she is well aware and she states that it is his decision and she will respect that that when she does follow-up with Dr Stubbs if they change his mind that they could sell up as an outpatient  Eliquis 5 mg p.o. twice daily  Lipitor 40 mg daily  Lasix 20 mg IV push every 12  Toprol-XL 25 mg daily  Diovan 40 mg daily  Add Imdur 60 mg daily  Nephrology for hyponatremia  Due to patient's status medical management only at this present time  Further recommendations per Dr Enoc Gardner CNP  [facility]      Of note, this documentation is completed using the Dragon Dictation system (voice recognition software). There may be spelling and/or grammatical errors that were not corrected prior to final submission.    Please do not hesitate to call with questions.  Electronically signed by QING Shore-CNP, on 1/31/2025 at 9:48 AM    Patient seen and examined in conjunction with QING Rod and agree with the evaluation as noted above.  Patient remains in status quo, appears moderately volume overloaded but responding to diuretics.  Once again, he has declined to have any invasive cardiac catheterization so we will continue with current supportive treatment.

## 2025-01-31 NOTE — CONSULTS
[facility]  Consult Note           Reason for Consult:  hyponatremia, fluid overload  Requesting Physician:  Dr. Perez    Chief Complaint:  sob and cough  History Obtained From:  patient, electronic medical record    History of Present Ilness:    89 y.o. year old male admitted with sob and cough.  CXR showed PNA.  Some concern for chf as well. Cardio consulted.  EF 35%.  IVC mildly dilated.  Severe pulm HTN.  Home meds include desyrl but no diuretics. Give zithromax ceftriaxone and low dose lasix 20 bid.  First dose of lasix was on 1/30.  Na was 136 then 132 and then 126 on 1/29 (before getting diuretics).  Then 123 and now 118.   Pt got chest CT - results pending.  Urine sg was 1.019.  he denies drinking a lot of fluid.  Not a current smoker but did smoke some many years ago.      Past Medical History:    Past Medical History:   Diagnosis Date    Coronary artery disease         Past Surgical History:    Past Surgical History:   Procedure Laterality Date    ABDOMINAL AORTIC ANEURYSM REPAIR      CHOLECYSTECTOMY          Home Medications:    No current facility-administered medications on file prior to encounter.     Current Outpatient Medications on File Prior to Encounter   Medication Sig Dispense Refill    albuterol (Ventolin HFA) 90 mcg/actuation inhaler Inhale 2 puffs every 4 hours if needed for wheezing or shortness of breath. 18 g 2    atorvastatin (Lipitor) 40 mg tablet TAKE 1 TABLET BY MOUTH EVERY DAY 90 tablet 3    Eliquis 5 mg tablet Take 1 tablet (5 mg) by mouth 2 times a day. 60 tablet 3    multivitamin capsule Take 1 capsule by mouth once daily.      tamsulosin (Flomax) 0.4 mg 24 hr capsule TAKE 2 CAPSULES BY MOUTH ONCE DAILY 180 capsule 1    traZODone (Desyrel) 100 mg tablet Take 1 tablet (100 mg) by mouth as needed at bedtime for sleep. 30 tablet 5    apixaban (Eliquis DVT-PE Treat 30D Start) 5 mg (74 tabs) tablets,dose pack Take 2 tablets (10 mg) by mouth every 12 hours.       benzocaine-menthoL (Cepacol Sore Throat, lyndsey-men,) 15-2.3 mg lozenge Place into mouth between cheek and gum.      traZODone (Desyrel) 50 mg tablet TAKE 1 TABLET (50 MG) BY MOUTH ONCE DAILY AT BEDTIME. 90 tablet 0       Allergies:  Patient has no known allergies.    Social History:    Social History     Socioeconomic History    Marital status:      Spouse name: Not on file    Number of children: Not on file    Years of education: Not on file    Highest education level: Not on file   Occupational History    Not on file   Tobacco Use    Smoking status: Never     Passive exposure: Never    Smokeless tobacco: Never   Substance and Sexual Activity    Alcohol use: Never    Drug use: Never    Sexual activity: Not on file   Other Topics Concern    Not on file   Social History Narrative    Not on file     Social Drivers of Health     Financial Resource Strain: Low Risk  (1/27/2025)    Overall Financial Resource Strain (CARDIA)     Difficulty of Paying Living Expenses: Not hard at all   Food Insecurity: No Food Insecurity (1/27/2025)    Hunger Vital Sign     Worried About Running Out of Food in the Last Year: Never true     Ran Out of Food in the Last Year: Never true   Transportation Needs: No Transportation Needs (1/27/2025)    PRAPARE - Transportation     Lack of Transportation (Medical): No     Lack of Transportation (Non-Medical): No   Physical Activity: Inactive (1/27/2025)    Exercise Vital Sign     Days of Exercise per Week: 0 days     Minutes of Exercise per Session: 0 min   Stress: No Stress Concern Present (1/27/2025)    [employer] - Occupational Stress Questionnaire     Feeling of Stress : Not at all   Social Connections: Socially Isolated (1/27/2025)    Social Connection and Isolation Panel [NHANES]     Frequency of Communication with Friends and Family: Three times a week     Frequency of Social Gatherings with Friends and Family: Three times a week     Attends Confucianist  "Services: Never     Active Member of Clubs or Organizations: No     Attends Club or Organization Meetings: Never     Marital Status:    Intimate Partner Violence: Not At Risk (1/27/2025)    Humiliation, Afraid, Rape, and Kick questionnaire     Fear of Current or Ex-Partner: No     Emotionally Abused: No     Physically Abused: No     Sexually Abused: No   Housing Stability: Low Risk  (1/27/2025)    Housing Stability Vital Sign     Unable to Pay for Housing in the Last Year: No     Number of Times Moved in the Last Year: 1     Homeless in the Last Year: No       Family History:   Family History   Problem Relation Name Age of Onset    No Known Problems Mother      No Known Problems Father         Review of Systems:   Review of Systems   Constitutional:  Negative for activity change.   HENT:  Negative for congestion.    Eyes:  Negative for discharge.   Respiratory:  Positive for shortness of breath.    Cardiovascular:  Positive for leg swelling.   Gastrointestinal:  Negative for abdominal distention.   Endocrine: Negative for cold intolerance.   Genitourinary:  Negative for difficulty urinating.   Musculoskeletal:  Negative for arthralgias.   Allergic/Immunologic: Negative for environmental allergies.   Neurological:  Negative for dizziness.   Hematological:  Negative for adenopathy.   Psychiatric/Behavioral:  Negative for agitation.          Physical exam:   Constitutional:  VITALS:  /61 (Patient Position: Sitting)   Pulse 69   Temp 35.6 °C (96.1 °F)   Resp 19   Ht 1.88 m (6' 2\")   Wt 104 kg (229 lb 8 oz)   SpO2 97%   BMI 29.47 kg/m²      Wt Readings from Last 3 Encounters:   01/31/25 104 kg (229 lb 8 oz)   11/12/24 97.5 kg (215 lb)   08/13/24 97.6 kg (215 lb 3.2 oz)      Gen: alert, awake, nad  Head: atraumatic, normocephalic  Skin: no rash, turgor wnl  Heent:  eomi, mmm  Neck: no bruits or jvd noted, thyroid normal  Lungs:  clear to auscultation  Heart:  regular rate and rhythm, no " "murmurs  Abdomen:  +bs, soft, nt, nd, no hepatosplenomegaly   Extremities: 1+edema  Psychiatric: mood and affect appropriate; judgement and insight intact  Musculoskeletal:  Rom, muscular strength intact; digits, nails normal    Data/  CBC:   Lab Results   Component Value Date    WBC 11.5 (H) 01/31/2025    RBC 3.52 (L) 01/31/2025    HGB 8.8 (L) 01/31/2025    HCT 27.2 (L) 01/31/2025    MCV 77 (L) 01/31/2025    MCH 25.0 (L) 01/31/2025    MCHC 32.4 01/31/2025    RDW 15.1 (H) 01/31/2025     (H) 01/31/2025     BMP:    Lab Results   Component Value Date     (LL) 01/31/2025    K 4.8 01/31/2025    CL 85 (L) 01/31/2025    CO2 23 01/31/2025    BUN 24 (H) 01/31/2025    CREATININE 1.22 01/31/2025    CALCIUM 7.6 (L) 01/31/2025    GLUCOSE 188 (H) 01/31/2025     ECG 12 Lead  Atrial flutter with variable AV block with premature ventricular or aberrantly conducted complexes  Left axis deviation  Anterior infarct , age undetermined  ST & T wave abnormality, consider inferior ischemia  Abnormal ECG  When compared with ECG of 31-JAN-2025 03:40, (unconfirmed)  Significant changes have occurred  ECG 12 lead  Sinus tachycardia  Right superior axis deviation  Marked ST abnormality, possible inferior subendocardial injury  Abnormal ECG  When compared with ECG of 31-JAN-2025 03:39, (unconfirmed)  No significant change was found    LFT: No results found for: \"AST\", \"ALT\", \"ALKPHOS\", \"BILITOT\"   Urinalysis: No results found for: \"JACKELINE\", \"PROTUR\", \"GLUCOSEU\", \"BLOODU\", \"KETONESU\", \"BILIRUBINU\", \"NITRITEU\", \"LEUKOCYTESU\", \"UTPCR\"   Imaging: ECG 12 Lead  Atrial flutter with variable AV block with premature ventricular or aberrantly conducted complexes  Left axis deviation  Anterior infarct , age undetermined  ST & T wave abnormality, consider inferior ischemia  Abnormal ECG  When compared with ECG of 31-JAN-2025 03:40, (unconfirmed)  Significant changes have occurred  ECG 12 lead  Sinus tachycardia  Right superior axis " deviation  Marked ST abnormality, possible inferior subendocardial injury  Abnormal ECG  When compared with ECG of 31-JAN-2025 03:39, (unconfirmed)  No significant change was found           Assessment/  89 y.o. yo male admitted with sob.  Found to have PNA and new CHF with ef 35% and pulm HTN.  Na 136 on admission but down to 118 now.  Looks hypervolemic.  Sodium level was dropping even before he got lasix so cannot blame entirely on lasix.  On SSRI.  Urine sg 1.019 on admission.        Plan/  Will order urine na and osmolality but will be hard to interpret on lasix  Hold lasix and give 1 dose of samsca  Repeat labs later and in am  Check renal ultrasound as he has some ckd as well  D/w Dr. Perez before seeing patient  Outpatient follow up from renal standpoint: TBD    Thank you for the consultation.  Please do not hesitate to call with questions.    Omar Dior MD

## 2025-02-01 ENCOUNTER — APPOINTMENT (OUTPATIENT)
Dept: RADIOLOGY | Facility: HOSPITAL | Age: OVER 89
End: 2025-02-01
Payer: MEDICARE

## 2025-02-01 ENCOUNTER — APPOINTMENT (OUTPATIENT)
Dept: CARDIOLOGY | Facility: HOSPITAL | Age: OVER 89
End: 2025-02-01
Payer: MEDICARE

## 2025-02-01 LAB
ANION GAP SERPL CALC-SCNC: 14 MMOL/L (ref 10–20)
ANION GAP SERPL CALC-SCNC: 16 MMOL/L (ref 10–20)
ATRIAL RATE: 238 BPM
BACTERIA BLD CULT: NORMAL
BACTERIA BLD CULT: NORMAL
BASOPHILS # BLD AUTO: 0.02 X10*3/UL (ref 0–0.1)
BASOPHILS NFR BLD AUTO: 0.1 %
BNP SERPL-MCNC: 2512 PG/ML (ref 0–99)
BUN SERPL-MCNC: 28 MG/DL (ref 6–23)
BUN SERPL-MCNC: 30 MG/DL (ref 6–23)
CALCIUM SERPL-MCNC: 7.5 MG/DL (ref 8.6–10.3)
CALCIUM SERPL-MCNC: 7.8 MG/DL (ref 8.6–10.3)
CHLORIDE SERPL-SCNC: 80 MMOL/L (ref 98–107)
CHLORIDE SERPL-SCNC: 82 MMOL/L (ref 98–107)
CO2 SERPL-SCNC: 22 MMOL/L (ref 21–32)
CO2 SERPL-SCNC: 22 MMOL/L (ref 21–32)
CREAT SERPL-MCNC: 1.19 MG/DL (ref 0.5–1.3)
CREAT SERPL-MCNC: 1.24 MG/DL (ref 0.5–1.3)
CREAT UR-MCNC: 117.4 MG/DL (ref 20–370)
EGFRCR SERPLBLD CKD-EPI 2021: 54 ML/MIN/1.73M*2
EGFRCR SERPLBLD CKD-EPI 2021: 56 ML/MIN/1.73M*2
EOSINOPHIL # BLD AUTO: 0.04 X10*3/UL (ref 0–0.4)
EOSINOPHIL NFR BLD AUTO: 0.3 %
ERYTHROCYTE [DISTWIDTH] IN BLOOD BY AUTOMATED COUNT: 15.2 % (ref 11.5–14.5)
GLUCOSE BLD MANUAL STRIP-MCNC: 158 MG/DL (ref 74–99)
GLUCOSE BLD MANUAL STRIP-MCNC: 190 MG/DL (ref 74–99)
GLUCOSE BLD MANUAL STRIP-MCNC: 193 MG/DL (ref 74–99)
GLUCOSE BLD MANUAL STRIP-MCNC: 203 MG/DL (ref 74–99)
GLUCOSE BLD MANUAL STRIP-MCNC: 245 MG/DL (ref 74–99)
GLUCOSE SERPL-MCNC: 148 MG/DL (ref 74–99)
GLUCOSE SERPL-MCNC: 195 MG/DL (ref 74–99)
HCT VFR BLD AUTO: 26.4 % (ref 41–52)
HGB BLD-MCNC: 8.7 G/DL (ref 13.5–17.5)
IMM GRANULOCYTES # BLD AUTO: 0.26 X10*3/UL (ref 0–0.5)
IMM GRANULOCYTES NFR BLD AUTO: 1.9 % (ref 0–0.9)
LEGIONELLA AG UR QL: NEGATIVE
LYMPHOCYTES # BLD AUTO: 2.26 X10*3/UL (ref 0.8–3)
LYMPHOCYTES NFR BLD AUTO: 16.4 %
MAGNESIUM SERPL-MCNC: 2.05 MG/DL (ref 1.6–2.4)
MCH RBC QN AUTO: 25.7 PG (ref 26–34)
MCHC RBC AUTO-ENTMCNC: 33 G/DL (ref 32–36)
MCV RBC AUTO: 78 FL (ref 80–100)
MONOCYTES # BLD AUTO: 1.11 X10*3/UL (ref 0.05–0.8)
MONOCYTES NFR BLD AUTO: 8 %
NEUTROPHILS # BLD AUTO: 10.12 X10*3/UL (ref 1.6–5.5)
NEUTROPHILS NFR BLD AUTO: 73.3 %
NRBC BLD-RTO: 0.4 /100 WBCS (ref 0–0)
OSMOLALITY UR: 446 MOSM/KG (ref 200–1200)
P AXIS: -81 DEGREES
PLATELET # BLD AUTO: 495 X10*3/UL (ref 150–450)
POTASSIUM SERPL-SCNC: 5.2 MMOL/L (ref 3.5–5.3)
POTASSIUM SERPL-SCNC: 5.5 MMOL/L (ref 3.5–5.3)
Q ONSET: 213 MS
QRS COUNT: 20 BEATS
QRS DURATION: 92 MS
QT INTERVAL: 332 MS
QTC CALCULATION(BAZETT): 467 MS
QTC FREDERICIA: 417 MS
R AXIS: -31 DEGREES
RBC # BLD AUTO: 3.39 X10*6/UL (ref 4.5–5.9)
SODIUM SERPL-SCNC: 112 MMOL/L (ref 136–145)
SODIUM SERPL-SCNC: 113 MMOL/L (ref 136–145)
SODIUM UR-SCNC: <10 MMOL/L
SODIUM/CREAT UR-RTO: NORMAL
T AXIS: -58 DEGREES
T OFFSET: 379 MS
VENTRICULAR RATE: 119 BPM
WBC # BLD AUTO: 13.8 X10*3/UL (ref 4.4–11.3)

## 2025-02-01 PROCEDURE — 99232 SBSQ HOSP IP/OBS MODERATE 35: CPT | Performed by: STUDENT IN AN ORGANIZED HEALTH CARE EDUCATION/TRAINING PROGRAM

## 2025-02-01 PROCEDURE — 80048 BASIC METABOLIC PNL TOTAL CA: CPT | Performed by: STUDENT IN AN ORGANIZED HEALTH CARE EDUCATION/TRAINING PROGRAM

## 2025-02-01 PROCEDURE — 93010 ELECTROCARDIOGRAM REPORT: CPT | Performed by: INTERNAL MEDICINE

## 2025-02-01 PROCEDURE — 83935 ASSAY OF URINE OSMOLALITY: CPT | Mod: ELYLAB | Performed by: STUDENT IN AN ORGANIZED HEALTH CARE EDUCATION/TRAINING PROGRAM

## 2025-02-01 PROCEDURE — 71045 X-RAY EXAM CHEST 1 VIEW: CPT

## 2025-02-01 PROCEDURE — 93005 ELECTROCARDIOGRAM TRACING: CPT

## 2025-02-01 PROCEDURE — 85025 COMPLETE CBC W/AUTO DIFF WBC: CPT | Performed by: STUDENT IN AN ORGANIZED HEALTH CARE EDUCATION/TRAINING PROGRAM

## 2025-02-01 PROCEDURE — 2500000004 HC RX 250 GENERAL PHARMACY W/ HCPCS (ALT 636 FOR OP/ED): Performed by: STUDENT IN AN ORGANIZED HEALTH CARE EDUCATION/TRAINING PROGRAM

## 2025-02-01 PROCEDURE — 2500000002 HC RX 250 W HCPCS SELF ADMINISTERED DRUGS (ALT 637 FOR MEDICARE OP, ALT 636 FOR OP/ED): Performed by: NURSE PRACTITIONER

## 2025-02-01 PROCEDURE — 2500000001 HC RX 250 WO HCPCS SELF ADMINISTERED DRUGS (ALT 637 FOR MEDICARE OP): Performed by: STUDENT IN AN ORGANIZED HEALTH CARE EDUCATION/TRAINING PROGRAM

## 2025-02-01 PROCEDURE — 2500000002 HC RX 250 W HCPCS SELF ADMINISTERED DRUGS (ALT 637 FOR MEDICARE OP, ALT 636 FOR OP/ED): Performed by: STUDENT IN AN ORGANIZED HEALTH CARE EDUCATION/TRAINING PROGRAM

## 2025-02-01 PROCEDURE — 2500000002 HC RX 250 W HCPCS SELF ADMINISTERED DRUGS (ALT 637 FOR MEDICARE OP, ALT 636 FOR OP/ED): Performed by: INTERNAL MEDICINE

## 2025-02-01 PROCEDURE — 94640 AIRWAY INHALATION TREATMENT: CPT

## 2025-02-01 PROCEDURE — 71045 X-RAY EXAM CHEST 1 VIEW: CPT | Performed by: RADIOLOGY

## 2025-02-01 PROCEDURE — 83735 ASSAY OF MAGNESIUM: CPT | Performed by: STUDENT IN AN ORGANIZED HEALTH CARE EDUCATION/TRAINING PROGRAM

## 2025-02-01 PROCEDURE — 83880 ASSAY OF NATRIURETIC PEPTIDE: CPT | Performed by: STUDENT IN AN ORGANIZED HEALTH CARE EDUCATION/TRAINING PROGRAM

## 2025-02-01 PROCEDURE — 82947 ASSAY GLUCOSE BLOOD QUANT: CPT

## 2025-02-01 PROCEDURE — 82570 ASSAY OF URINE CREATININE: CPT | Performed by: STUDENT IN AN ORGANIZED HEALTH CARE EDUCATION/TRAINING PROGRAM

## 2025-02-01 PROCEDURE — 2500000005 HC RX 250 GENERAL PHARMACY W/O HCPCS: Performed by: STUDENT IN AN ORGANIZED HEALTH CARE EDUCATION/TRAINING PROGRAM

## 2025-02-01 PROCEDURE — 36415 COLL VENOUS BLD VENIPUNCTURE: CPT | Performed by: STUDENT IN AN ORGANIZED HEALTH CARE EDUCATION/TRAINING PROGRAM

## 2025-02-01 PROCEDURE — 2500000001 HC RX 250 WO HCPCS SELF ADMINISTERED DRUGS (ALT 637 FOR MEDICARE OP): Performed by: NURSE PRACTITIONER

## 2025-02-01 PROCEDURE — 2500000002 HC RX 250 W HCPCS SELF ADMINISTERED DRUGS (ALT 637 FOR MEDICARE OP, ALT 636 FOR OP/ED): Performed by: HOSPITALIST

## 2025-02-01 PROCEDURE — 2500000004 HC RX 250 GENERAL PHARMACY W/ HCPCS (ALT 636 FOR OP/ED): Performed by: HOSPITALIST

## 2025-02-01 PROCEDURE — 1200000002 HC GENERAL ROOM WITH TELEMETRY DAILY

## 2025-02-01 RX ORDER — IPRATROPIUM BROMIDE AND ALBUTEROL SULFATE 2.5; .5 MG/3ML; MG/3ML
3 SOLUTION RESPIRATORY (INHALATION) 3 TIMES DAILY
Status: DISCONTINUED | OUTPATIENT
Start: 2025-02-02 | End: 2025-02-07 | Stop reason: HOSPADM

## 2025-02-01 RX ORDER — TOLVAPTAN 15 MG/1
30 TABLET ORAL ONCE
Status: COMPLETED | OUTPATIENT
Start: 2025-02-01 | End: 2025-02-01

## 2025-02-01 RX ORDER — FUROSEMIDE 10 MG/ML
40 INJECTION INTRAMUSCULAR; INTRAVENOUS ONCE
Status: COMPLETED | OUTPATIENT
Start: 2025-02-01 | End: 2025-02-01

## 2025-02-01 RX ADMIN — DEXTROSE MONOHYDRATE 2 G: 5 INJECTION INTRAVENOUS at 10:38

## 2025-02-01 RX ADMIN — GUAIFENESIN 600 MG: 600 TABLET, EXTENDED RELEASE ORAL at 21:39

## 2025-02-01 RX ADMIN — METOPROLOL SUCCINATE 25 MG: 25 TABLET, EXTENDED RELEASE ORAL at 08:17

## 2025-02-01 RX ADMIN — IPRATROPIUM BROMIDE AND ALBUTEROL SULFATE 3 ML: 2.5; .5 SOLUTION RESPIRATORY (INHALATION) at 12:41

## 2025-02-01 RX ADMIN — ISOSORBIDE MONONITRATE 60 MG: 60 TABLET, EXTENDED RELEASE ORAL at 08:18

## 2025-02-01 RX ADMIN — IPRATROPIUM BROMIDE AND ALBUTEROL SULFATE 3 ML: 2.5; .5 SOLUTION RESPIRATORY (INHALATION) at 01:06

## 2025-02-01 RX ADMIN — VALSARTAN 40 MG: 80 TABLET, FILM COATED ORAL at 08:17

## 2025-02-01 RX ADMIN — TOLVAPTAN 30 MG: 15 TABLET ORAL at 21:39

## 2025-02-01 RX ADMIN — GUAIFENESIN 600 MG: 600 TABLET, EXTENDED RELEASE ORAL at 08:18

## 2025-02-01 RX ADMIN — APIXABAN 5 MG: 5 TABLET, FILM COATED ORAL at 21:39

## 2025-02-01 RX ADMIN — ONDANSETRON 4 MG: 2 INJECTION INTRAMUSCULAR; INTRAVENOUS at 08:34

## 2025-02-01 RX ADMIN — Medication 4 L/MIN: at 19:52

## 2025-02-01 RX ADMIN — TOLVAPTAN 30 MG: 15 TABLET ORAL at 10:36

## 2025-02-01 RX ADMIN — APIXABAN 5 MG: 5 TABLET, FILM COATED ORAL at 08:18

## 2025-02-01 RX ADMIN — AZITHROMYCIN DIHYDRATE 500 MG: 250 TABLET, FILM COATED ORAL at 08:17

## 2025-02-01 RX ADMIN — ATORVASTATIN CALCIUM 40 MG: 20 TABLET, FILM COATED ORAL at 21:39

## 2025-02-01 RX ADMIN — Medication 3 L/MIN: at 06:38

## 2025-02-01 RX ADMIN — TAMSULOSIN HYDROCHLORIDE 0.8 MG: 0.4 CAPSULE ORAL at 08:17

## 2025-02-01 RX ADMIN — TRAZODONE HYDROCHLORIDE 50 MG: 50 TABLET ORAL at 21:39

## 2025-02-01 RX ADMIN — IPRATROPIUM BROMIDE AND ALBUTEROL SULFATE 3 ML: 2.5; .5 SOLUTION RESPIRATORY (INHALATION) at 06:38

## 2025-02-01 RX ADMIN — ONDANSETRON 4 MG: 2 INJECTION INTRAMUSCULAR; INTRAVENOUS at 02:51

## 2025-02-01 RX ADMIN — ACETAMINOPHEN 650 MG: 325 TABLET ORAL at 08:28

## 2025-02-01 RX ADMIN — IPRATROPIUM BROMIDE AND ALBUTEROL SULFATE 3 ML: 2.5; .5 SOLUTION RESPIRATORY (INHALATION) at 19:52

## 2025-02-01 RX ADMIN — FUROSEMIDE 40 MG: 10 INJECTION, SOLUTION INTRAMUSCULAR; INTRAVENOUS at 23:43

## 2025-02-01 RX ADMIN — STANDARDIZED SENNA CONCENTRATE 17.2 MG: 8.6 TABLET ORAL at 21:39

## 2025-02-01 RX ADMIN — Medication 3 L/MIN: at 12:41

## 2025-02-01 ASSESSMENT — COGNITIVE AND FUNCTIONAL STATUS - GENERAL
MOBILITY SCORE: 19
HELP NEEDED FOR BATHING: A LITTLE
DAILY ACTIVITIY SCORE: 18
DRESSING REGULAR UPPER BODY CLOTHING: A LITTLE
CLIMB 3 TO 5 STEPS WITH RAILING: A LITTLE
PERSONAL GROOMING: A LITTLE
EATING MEALS: A LITTLE
WALKING IN HOSPITAL ROOM: A LITTLE
STANDING UP FROM CHAIR USING ARMS: A LITTLE
DRESSING REGULAR LOWER BODY CLOTHING: A LITTLE
MOVING TO AND FROM BED TO CHAIR: A LITTLE
TOILETING: A LITTLE
MOVING FROM LYING ON BACK TO SITTING ON SIDE OF FLAT BED WITH BEDRAILS: A LITTLE

## 2025-02-01 ASSESSMENT — PAIN DESCRIPTION - LOCATION: LOCATION: HEAD

## 2025-02-01 ASSESSMENT — PAIN SCALES - PAIN ASSESSMENT IN ADVANCED DEMENTIA (PAINAD): TOTALSCORE: MEDICATION (SEE MAR)

## 2025-02-01 ASSESSMENT — PAIN SCALES - GENERAL: PAINLEVEL_OUTOF10: 3

## 2025-02-01 NOTE — NURSING NOTE
Patient left shin had a dressing in place but had  some drainage leaking , so dressing was changed to skin tear and instead of Teds, ace wraps knee to toe were applied

## 2025-02-01 NOTE — CARE PLAN
The patient's goals for the shift include to be able to get home    The clinical goals for the shift include LAbs WNL

## 2025-02-01 NOTE — CARE PLAN
The patient's goals for the shift include to be able to get home    The clinical goals for the shift include labs within normal range

## 2025-02-01 NOTE — PROGRESS NOTES
Nephrology Progress Note    Assessment:  89 y.o. yo male admitted with sob.  Found to have PNA and new CHF with ef 35% and pulm HTN.  Na 136 on admission but down to 118 when initial consult done.  Looks hypervolemic.  Sodium level was dropping even before he got lasix so cannot blame entirely on lasix.  On SSRI.  Urine sg 1.019 on admission.  NO hydronephrosis noted on renal ultrasound, has nml sized kidneys      Plan:  Khalida 24 and Uosm in the 400s s/o element of volume depletion however pt appears hypervolemic and endorses orthopnea making picture unclear, torsemide 30 mg ordered, will repeat BMP  Will check NTproBNP as well   If no improvement may need to 3% NS and closer monitoring in which case might need transfer to ICU  Keep lasix on hold     Outpatient follow up from renal standpoint: TBD       Subjective:  Admit Date: 1/27/2025    Interval History: Na w/ no improvement w/ 2 doses of tolvaptan given yesterday, another dose ordered this AM, pt states has not voided much    Medications:  Scheduled Meds:apixaban, 5 mg, oral, BID  atorvastatin, 40 mg, oral, Nightly  azithromycin, 500 mg, oral, q24h FREDDY  cefTRIAXone, 2 g, intravenous, q24h  [Held by provider] furosemide, 20 mg, intravenous, q12h  guaiFENesin, 600 mg, oral, BID  insulin lispro, 0-5 Units, subcutaneous, TID AC  ipratropium-albuteroL, 3 mL, nebulization, q6h  isosorbide mononitrate ER, 60 mg, oral, Daily  metoprolol succinate XL, 25 mg, oral, Daily  oxygen, , inhalation, Continuous - Inhalation  polyethylene glycol, 17 g, oral, Daily  sennosides, 2 tablet, oral, Nightly  tamsulosin, 0.8 mg, oral, Daily  traZODone, 50 mg, oral, Nightly  valsartan, 40 mg, oral, Daily      Continuous Infusions:     CBC:   Lab Results   Component Value Date    WBC 13.8 (H) 02/01/2025    RBC 3.39 (L) 02/01/2025    HGB 8.7 (L) 02/01/2025    HCT 26.4 (L) 02/01/2025    MCV 78 (L) 02/01/2025    MCH 25.7 (L) 02/01/2025    MCHC 33.0 02/01/2025    RDW 15.2 (H) 02/01/2025    PLT  "495 (H) 02/01/2025     BMP:    Lab Results   Component Value Date     (LL) 02/01/2025    K 5.5 (H) 02/01/2025    CL 82 (L) 02/01/2025    CO2 22 02/01/2025    BUN 28 (H) 02/01/2025    CREATININE 1.19 02/01/2025    CALCIUM 7.5 (L) 02/01/2025    GLUCOSE 195 (H) 02/01/2025       Objective:  Vitals: /82 (Patient Position: Sitting)   Pulse 99   Temp 35.6 °C (96.1 °F)   Resp 18   Ht 1.88 m (6' 2\")   Wt 98.9 kg (218 lb 0.6 oz)   SpO2 99%   BMI 27.99 kg/m²    Wt Readings from Last 3 Encounters:   02/01/25 98.9 kg (218 lb 0.6 oz)   11/12/24 97.5 kg (215 lb)   08/13/24 97.6 kg (215 lb 3.2 oz)      24HR INTAKE/OUTPUT:    Intake/Output Summary (Last 24 hours) at 2/1/2025 1234  Last data filed at 2/1/2025 1215  Gross per 24 hour   Intake 1125 ml   Output 650 ml   Net 475 ml       General: alert, in no apparent distress  HEENT: normocephalic, atraumatic, anicteric  Lungs: non-labored respirations, scattered coarse sounds w/ expiratory wheezes  Heart: regular rate and rhythm, no murmurs or rubs  Abdomen: soft, non-tender, non-distended  Ext: +BLE peripheral edema  Neuro: alert, follows commands      Electronically signed by Nuria Meyers MD, MD              "

## 2025-02-01 NOTE — PROGRESS NOTES
"Chris Russell is a 89 y.o. male on day 4 of admission presenting with Community acquired pneumonia, unspecified laterality.    Subjective   States that he feels short of breath still, nausea has improved, says that he was having some chills last night.  Objective     Physical Exam  Constitutional:       Appearance: Normal appearance.   Cardiovascular:      Rate and Rhythm: Normal rate and regular rhythm.   Pulmonary:      Effort: Pulmonary effort is normal.      Breath sounds: Wheezing present. No rales.   Abdominal:      Palpations: Abdomen is soft.   Neurological:      Mental Status: He is alert. Mental status is at baseline.   Psychiatric:         Mood and Affect: Mood normal.         Last Recorded Vitals  Blood pressure 155/72, pulse 88, temperature 35.9 °C (96.6 °F), temperature source Temporal, resp. rate 17, height 1.88 m (6' 2\"), weight 104 kg (229 lb 8 oz), SpO2 97%.  Intake/Output last 3 Shifts:  I/O last 3 completed shifts:  In: 460 (4.4 mL/kg) [P.O.:360; IV Piggyback:100]  Out: 1400 (13.4 mL/kg) [Urine:1400 (0.4 mL/kg/hr)]  Weight: 104.1 kg     Relevant Results                              Assessment/Plan   Assessment & Plan  Community acquired pneumonia, unspecified laterality    89-year-old male with past medical history of PE, BPH admitted with bacterial pneumonia    Patient's BNP came elevated in the 2000's, echocardiogram showed an EF of 35%  Hyponatremia is worsening most likely secondary to fluid overload  Will start him on IV Lasix 20 mg IV twice daily  Consult cardiology for CHF therapy, further workup  Continue ceftriaxone, azithromycin, Mucinex, DuoNebs, incentive spirometry  On Eliquis for previous PE  PT OT ordered  Try to wean O2 as tolerated  Trazodone, Flomax, statin resumed  Flu came back negative  Continue supportive care  DVT prophylaxis      Zackary Puga MD      "

## 2025-02-01 NOTE — PROGRESS NOTES
"Chris Russell is a 89 y.o. male on day 5 of admission presenting with sitting in chair appears comfortable       Subjective   Patient seen and examined.  Continues to complain of nausea and shortness of breath especially orthopnea.      Objective     Last Recorded Vitals  /82 (Patient Position: Sitting)   Pulse 99   Temp 35.6 °C (96.1 °F)   Resp 18   Ht 1.88 m (6' 2\")   Wt 98.9 kg (218 lb 0.6 oz)   SpO2 99%   BMI 27.99 kg/m²      Intake/Output last 3 Shifts:    Intake/Output Summary (Last 24 hours) at 2/1/2025 1348  Last data filed at 2/1/2025 1215  Gross per 24 hour   Intake 1125 ml   Output 650 ml   Net 475 ml       Scheduled medications  apixaban, 5 mg, oral, BID  atorvastatin, 40 mg, oral, Nightly  azithromycin, 500 mg, oral, q24h FREDDY  cefTRIAXone, 2 g, intravenous, q24h  [Held by provider] furosemide, 20 mg, intravenous, q12h  guaiFENesin, 600 mg, oral, BID  insulin lispro, 0-5 Units, subcutaneous, TID AC  ipratropium-albuteroL, 3 mL, nebulization, q6h  isosorbide mononitrate ER, 60 mg, oral, Daily  metoprolol succinate XL, 25 mg, oral, Daily  oxygen, , inhalation, Continuous - Inhalation  polyethylene glycol, 17 g, oral, Daily  sennosides, 2 tablet, oral, Nightly  tamsulosin, 0.8 mg, oral, Daily  traZODone, 50 mg, oral, Nightly  valsartan, 40 mg, oral, Daily      Continuous medications     PRN medications  PRN medications: acetaminophen **OR** acetaminophen **OR** acetaminophen, albuterol, benzonatate, calcium carbonate, dextrose, dextrose, glucagon, glucagon, melatonin, nitroglycerin, ondansetron, traMADol, traZODone    Physical Exam   Gen: NAD  HEENT: EOM, MMM  CV: RRR, no murmurs rubs or gallops  Resp: coarse rhonchi b/l   Abdomen: soft, NT,+BS  LE: 1+ pitting edema b/l     Relevant Results  Lab Results   Component Value Date    WBC 13.8 (H) 02/01/2025    HGB 8.7 (L) 02/01/2025    HCT 26.4 (L) 02/01/2025    MCV 78 (L) 02/01/2025     (H) 02/01/2025     Lab Results   Component Value Date "    GLUCOSE 195 (H) 02/01/2025    CALCIUM 7.5 (L) 02/01/2025     (LL) 02/01/2025    K 5.5 (H) 02/01/2025    CO2 22 02/01/2025    CL 82 (L) 02/01/2025    BUN 28 (H) 02/01/2025    CREATININE 1.19 02/01/2025     Lab Results   Component Value Date    HGBA1C 6.3 (H) 12/20/2024           Assessment/Plan  89 year old male admitted with acute hypoxic respiratory failure secondary to community aquired pneumonia and new ischemic cardiomyopathy with EF of 35% and hyponatremia    proBNP was elevated, chest x-ray showed possible fluid overload previously    On ceftriaxone for pneumonia treatment, Pro-Clayton was also elevated previously    Flu was negative    Reduced EF CHF exacerbation  -echo with EF 35%, seen by cardiology, C deferred, spoke with daughter.   -continue medical management for now  -Does report orthopnea, torsemide added by nephrology    Hyponatremia: Serum sodium continues to drop, will receive third dose of tolvaptan today, nephrology following    DMII: continue current therapy     Hx of pulmonary embolism: continue eliquis    Ambulatory dysfunction: PT/OT ambulating, like home with home care     DVT ppx: vanessa Puga MD

## 2025-02-02 ENCOUNTER — APPOINTMENT (OUTPATIENT)
Dept: CARDIOLOGY | Facility: HOSPITAL | Age: OVER 89
End: 2025-02-02
Payer: MEDICARE

## 2025-02-02 LAB
ANION GAP SERPL CALC-SCNC: 14 MMOL/L (ref 10–20)
ANION GAP SERPL CALC-SCNC: 15 MMOL/L (ref 10–20)
ATRIAL RATE: 65 BPM
ATRIAL RATE: 77 BPM
BASOPHILS # BLD AUTO: 0.02 X10*3/UL (ref 0–0.1)
BASOPHILS NFR BLD AUTO: 0.1 %
BUN SERPL-MCNC: 31 MG/DL (ref 6–23)
BUN SERPL-MCNC: 33 MG/DL (ref 6–23)
CALCIUM SERPL-MCNC: 7.4 MG/DL (ref 8.6–10.3)
CALCIUM SERPL-MCNC: 7.6 MG/DL (ref 8.6–10.3)
CHLORIDE SERPL-SCNC: 78 MMOL/L (ref 98–107)
CHLORIDE SERPL-SCNC: 79 MMOL/L (ref 98–107)
CO2 SERPL-SCNC: 23 MMOL/L (ref 21–32)
CO2 SERPL-SCNC: 25 MMOL/L (ref 21–32)
CREAT SERPL-MCNC: 1.28 MG/DL (ref 0.5–1.3)
CREAT SERPL-MCNC: 1.31 MG/DL (ref 0.5–1.3)
EGFRCR SERPLBLD CKD-EPI 2021: 50 ML/MIN/1.73M*2
EGFRCR SERPLBLD CKD-EPI 2021: 52 ML/MIN/1.73M*2
EOSINOPHIL # BLD AUTO: 0.02 X10*3/UL (ref 0–0.4)
EOSINOPHIL NFR BLD AUTO: 0.1 %
ERYTHROCYTE [DISTWIDTH] IN BLOOD BY AUTOMATED COUNT: 15 % (ref 11.5–14.5)
GLUCOSE BLD MANUAL STRIP-MCNC: 124 MG/DL (ref 74–99)
GLUCOSE BLD MANUAL STRIP-MCNC: 175 MG/DL (ref 74–99)
GLUCOSE BLD MANUAL STRIP-MCNC: 177 MG/DL (ref 74–99)
GLUCOSE BLD MANUAL STRIP-MCNC: 201 MG/DL (ref 74–99)
GLUCOSE SERPL-MCNC: 165 MG/DL (ref 74–99)
GLUCOSE SERPL-MCNC: 181 MG/DL (ref 74–99)
HCT VFR BLD AUTO: 26 % (ref 41–52)
HGB BLD-MCNC: 8.6 G/DL (ref 13.5–17.5)
HOLD SPECIMEN: NORMAL
IMM GRANULOCYTES # BLD AUTO: 0.45 X10*3/UL (ref 0–0.5)
IMM GRANULOCYTES NFR BLD AUTO: 3 % (ref 0–0.9)
LYMPHOCYTES # BLD AUTO: 2.58 X10*3/UL (ref 0.8–3)
LYMPHOCYTES NFR BLD AUTO: 17.4 %
MAGNESIUM SERPL-MCNC: 2.08 MG/DL (ref 1.6–2.4)
MCH RBC QN AUTO: 25 PG (ref 26–34)
MCHC RBC AUTO-ENTMCNC: 33.1 G/DL (ref 32–36)
MCV RBC AUTO: 76 FL (ref 80–100)
MONOCYTES # BLD AUTO: 1.16 X10*3/UL (ref 0.05–0.8)
MONOCYTES NFR BLD AUTO: 7.8 %
NEUTROPHILS # BLD AUTO: 10.59 X10*3/UL (ref 1.6–5.5)
NEUTROPHILS NFR BLD AUTO: 71.6 %
NRBC BLD-RTO: 0 /100 WBCS (ref 0–0)
OSMOLALITY UR: 576 MOSM/KG (ref 200–1200)
P AXIS: 66 DEGREES
P OFFSET: 108 MS
P OFFSET: 192 MS
P ONSET: 141 MS
P ONSET: 68 MS
PLATELET # BLD AUTO: 551 X10*3/UL (ref 150–450)
POTASSIUM SERPL-SCNC: 5.4 MMOL/L (ref 3.5–5.3)
POTASSIUM SERPL-SCNC: 5.4 MMOL/L (ref 3.5–5.3)
PR INTERVAL: 140 MS
PR INTERVAL: 290 MS
Q ONSET: 211 MS
Q ONSET: 213 MS
QRS COUNT: 11 BEATS
QRS COUNT: 13 BEATS
QRS DURATION: 84 MS
QRS DURATION: 86 MS
QT INTERVAL: 362 MS
QT INTERVAL: 374 MS
QTC CALCULATION(BAZETT): 388 MS
QTC CALCULATION(BAZETT): 409 MS
QTC FREDERICIA: 383 MS
QTC FREDERICIA: 393 MS
R AXIS: -61 DEGREES
R AXIS: -71 DEGREES
RBC # BLD AUTO: 3.44 X10*6/UL (ref 4.5–5.9)
SODIUM SERPL-SCNC: 112 MMOL/L (ref 136–145)
SODIUM SERPL-SCNC: 112 MMOL/L (ref 136–145)
T AXIS: -65 DEGREES
T AXIS: 247 DEGREES
T OFFSET: 392 MS
T OFFSET: 400 MS
VENTRICULAR RATE: 65 BPM
VENTRICULAR RATE: 77 BPM
WBC # BLD AUTO: 14.8 X10*3/UL (ref 4.4–11.3)

## 2025-02-02 PROCEDURE — 2500000005 HC RX 250 GENERAL PHARMACY W/O HCPCS: Performed by: STUDENT IN AN ORGANIZED HEALTH CARE EDUCATION/TRAINING PROGRAM

## 2025-02-02 PROCEDURE — 94640 AIRWAY INHALATION TREATMENT: CPT

## 2025-02-02 PROCEDURE — 2500000001 HC RX 250 WO HCPCS SELF ADMINISTERED DRUGS (ALT 637 FOR MEDICARE OP): Performed by: STUDENT IN AN ORGANIZED HEALTH CARE EDUCATION/TRAINING PROGRAM

## 2025-02-02 PROCEDURE — 82947 ASSAY GLUCOSE BLOOD QUANT: CPT | Performed by: STUDENT IN AN ORGANIZED HEALTH CARE EDUCATION/TRAINING PROGRAM

## 2025-02-02 PROCEDURE — 2500000002 HC RX 250 W HCPCS SELF ADMINISTERED DRUGS (ALT 637 FOR MEDICARE OP, ALT 636 FOR OP/ED): Performed by: STUDENT IN AN ORGANIZED HEALTH CARE EDUCATION/TRAINING PROGRAM

## 2025-02-02 PROCEDURE — 2500000004 HC RX 250 GENERAL PHARMACY W/ HCPCS (ALT 636 FOR OP/ED): Performed by: STUDENT IN AN ORGANIZED HEALTH CARE EDUCATION/TRAINING PROGRAM

## 2025-02-02 PROCEDURE — 93010 ELECTROCARDIOGRAM REPORT: CPT | Performed by: INTERNAL MEDICINE

## 2025-02-02 PROCEDURE — 82947 ASSAY GLUCOSE BLOOD QUANT: CPT

## 2025-02-02 PROCEDURE — 99233 SBSQ HOSP IP/OBS HIGH 50: CPT | Performed by: INTERNAL MEDICINE

## 2025-02-02 PROCEDURE — 2500000002 HC RX 250 W HCPCS SELF ADMINISTERED DRUGS (ALT 637 FOR MEDICARE OP, ALT 636 FOR OP/ED)

## 2025-02-02 PROCEDURE — 80048 BASIC METABOLIC PNL TOTAL CA: CPT | Performed by: STUDENT IN AN ORGANIZED HEALTH CARE EDUCATION/TRAINING PROGRAM

## 2025-02-02 PROCEDURE — 93005 ELECTROCARDIOGRAM TRACING: CPT

## 2025-02-02 PROCEDURE — 36415 COLL VENOUS BLD VENIPUNCTURE: CPT | Performed by: STUDENT IN AN ORGANIZED HEALTH CARE EDUCATION/TRAINING PROGRAM

## 2025-02-02 PROCEDURE — 99291 CRITICAL CARE FIRST HOUR: CPT | Performed by: STUDENT IN AN ORGANIZED HEALTH CARE EDUCATION/TRAINING PROGRAM

## 2025-02-02 PROCEDURE — 2500000002 HC RX 250 W HCPCS SELF ADMINISTERED DRUGS (ALT 637 FOR MEDICARE OP, ALT 636 FOR OP/ED): Performed by: HOSPITALIST

## 2025-02-02 PROCEDURE — 94660 CPAP INITIATION&MGMT: CPT

## 2025-02-02 PROCEDURE — 85025 COMPLETE CBC W/AUTO DIFF WBC: CPT | Performed by: STUDENT IN AN ORGANIZED HEALTH CARE EDUCATION/TRAINING PROGRAM

## 2025-02-02 PROCEDURE — 82533 TOTAL CORTISOL: CPT | Mod: ELYLAB

## 2025-02-02 PROCEDURE — 83735 ASSAY OF MAGNESIUM: CPT | Performed by: STUDENT IN AN ORGANIZED HEALTH CARE EDUCATION/TRAINING PROGRAM

## 2025-02-02 PROCEDURE — 2500000005 HC RX 250 GENERAL PHARMACY W/O HCPCS

## 2025-02-02 PROCEDURE — 2020000001 HC ICU ROOM DAILY

## 2025-02-02 RX ORDER — SODIUM CHLORIDE 1000 MG
1000 TABLET, SOLUBLE MISCELLANEOUS ONCE
Status: COMPLETED | OUTPATIENT
Start: 2025-02-02 | End: 2025-02-02

## 2025-02-02 RX ORDER — FUROSEMIDE 10 MG/ML
40 INJECTION INTRAMUSCULAR; INTRAVENOUS ONCE
Status: COMPLETED | OUTPATIENT
Start: 2025-02-02 | End: 2025-02-02

## 2025-02-02 RX ORDER — BISACODYL 10 MG/1
10 SUPPOSITORY RECTAL DAILY PRN
Status: DISCONTINUED | OUTPATIENT
Start: 2025-02-02 | End: 2025-02-05

## 2025-02-02 RX ORDER — SODIUM CHLORIDE 1000 MG
2000 TABLET, SOLUBLE MISCELLANEOUS
Status: DISPENSED | OUTPATIENT
Start: 2025-02-03 | End: 2025-02-03

## 2025-02-02 RX ORDER — DEXTROSE 50 % IN WATER (D50W) INTRAVENOUS SYRINGE
25 ONCE
Status: COMPLETED | OUTPATIENT
Start: 2025-02-02 | End: 2025-02-02

## 2025-02-02 RX ORDER — SODIUM CHLORIDE 1000 MG
1000 TABLET, SOLUBLE MISCELLANEOUS
Status: DISCONTINUED | OUTPATIENT
Start: 2025-02-02 | End: 2025-02-02

## 2025-02-02 RX ORDER — SODIUM CHLORIDE 1000 MG
2000 TABLET, SOLUBLE MISCELLANEOUS
Status: CANCELLED | OUTPATIENT
Start: 2025-02-03

## 2025-02-02 RX ORDER — ADHESIVE BANDAGE
30 BANDAGE TOPICAL DAILY PRN
Status: DISCONTINUED | OUTPATIENT
Start: 2025-02-02 | End: 2025-02-05

## 2025-02-02 RX ADMIN — IPRATROPIUM BROMIDE AND ALBUTEROL SULFATE 3 ML: .5; 3 SOLUTION RESPIRATORY (INHALATION) at 20:13

## 2025-02-02 RX ADMIN — METOPROLOL SUCCINATE 25 MG: 25 TABLET, EXTENDED RELEASE ORAL at 09:08

## 2025-02-02 RX ADMIN — ATORVASTATIN CALCIUM 40 MG: 20 TABLET, FILM COATED ORAL at 20:14

## 2025-02-02 RX ADMIN — Medication 4 L/MIN: at 08:28

## 2025-02-02 RX ADMIN — POLYETHYLENE GLYCOL 3350 17 G: 17 POWDER, FOR SOLUTION ORAL at 15:18

## 2025-02-02 RX ADMIN — TRAZODONE HYDROCHLORIDE 50 MG: 50 TABLET ORAL at 20:14

## 2025-02-02 RX ADMIN — STANDARDIZED SENNA CONCENTRATE 17.2 MG: 8.6 TABLET ORAL at 20:14

## 2025-02-02 RX ADMIN — FUROSEMIDE 40 MG: 10 INJECTION, SOLUTION INTRAMUSCULAR; INTRAVENOUS at 10:45

## 2025-02-02 RX ADMIN — INSULIN HUMAN 10 UNITS: 100 INJECTION, SOLUTION PARENTERAL at 19:38

## 2025-02-02 RX ADMIN — Medication 4 L/MIN: at 06:41

## 2025-02-02 RX ADMIN — ALBUTEROL SULFATE 2 PUFF: 108 INHALANT RESPIRATORY (INHALATION) at 20:11

## 2025-02-02 RX ADMIN — IPRATROPIUM BROMIDE AND ALBUTEROL SULFATE 3 ML: .5; 3 SOLUTION RESPIRATORY (INHALATION) at 12:50

## 2025-02-02 RX ADMIN — IPRATROPIUM BROMIDE AND ALBUTEROL SULFATE 3 ML: .5; 3 SOLUTION RESPIRATORY (INHALATION) at 06:41

## 2025-02-02 RX ADMIN — Medication 5 MG: at 20:14

## 2025-02-02 RX ADMIN — APIXABAN 5 MG: 5 TABLET, FILM COATED ORAL at 20:12

## 2025-02-02 RX ADMIN — APIXABAN 5 MG: 5 TABLET, FILM COATED ORAL at 09:08

## 2025-02-02 RX ADMIN — TAMSULOSIN HYDROCHLORIDE 0.8 MG: 0.4 CAPSULE ORAL at 09:08

## 2025-02-02 RX ADMIN — DEXTROSE MONOHYDRATE 25 G: 25 INJECTION, SOLUTION INTRAVENOUS at 19:37

## 2025-02-02 RX ADMIN — ISOSORBIDE MONONITRATE 60 MG: 60 TABLET, EXTENDED RELEASE ORAL at 09:08

## 2025-02-02 RX ADMIN — AZITHROMYCIN DIHYDRATE 500 MG: 250 TABLET, FILM COATED ORAL at 09:08

## 2025-02-02 RX ADMIN — GUAIFENESIN 600 MG: 600 TABLET, EXTENDED RELEASE ORAL at 20:14

## 2025-02-02 RX ADMIN — Medication 1 G: at 17:33

## 2025-02-02 RX ADMIN — GUAIFENESIN 600 MG: 600 TABLET, EXTENDED RELEASE ORAL at 09:08

## 2025-02-02 RX ADMIN — Medication 1 G: at 21:21

## 2025-02-02 ASSESSMENT — ENCOUNTER SYMPTOMS
FEVER: 0
COUGH: 0
DIZZINESS: 1
ABDOMINAL DISTENTION: 0
TROUBLE SWALLOWING: 0
CONFUSION: 0
SHORTNESS OF BREATH: 1
SLEEP DISTURBANCE: 0
CHILLS: 0
CONSTIPATION: 0
HEADACHES: 0
WHEEZING: 0
SPEECH DIFFICULTY: 0
JOINT SWELLING: 0
DIFFICULTY URINATING: 0
UNEXPECTED WEIGHT CHANGE: 0
VOMITING: 0
ABDOMINAL PAIN: 1
LIGHT-HEADEDNESS: 0
DIARRHEA: 0
NAUSEA: 0
COLOR CHANGE: 0
ARTHRALGIAS: 0

## 2025-02-02 NOTE — CARE PLAN
The patient's goals for the shift include to be able to get home    The clinical goals for the shift include labs within normal range      Problem: Safety - Adult  Goal: Free from fall injury  Outcome: Progressing     Problem: Discharge Planning  Goal: Discharge to home or other facility with appropriate resources  Outcome: Progressing     Problem: Chronic Conditions and Co-morbidities  Goal: Patient's chronic conditions and co-morbidity symptoms are monitored and maintained or improved  Outcome: Progressing     Problem: Nutrition  Goal: Nutrient intake appropriate for maintaining nutritional needs  Outcome: Progressing     Problem: Diabetes  Goal: Achieve decreasing blood glucose levels by end of shift  Outcome: Progressing  Goal: Increase stability of blood glucose readings by end of shift  Outcome: Progressing  Goal: Decrease in ketones present in urine by end of shift  Outcome: Progressing  Goal: Maintain electrolyte levels within acceptable range throughout shift  Outcome: Progressing  Goal: Maintain glucose levels >70mg/dl to <250mg/dl throughout shift  Outcome: Progressing  Goal: No changes in neurological exam by end of shift  Outcome: Progressing  Goal: Learn about and adhere to nutrition recommendations by end of shift  Outcome: Progressing  Goal: Vital signs within normal range for age by end of shift  Outcome: Progressing  Goal: Increase self care and/or family involovement by end of shift  Outcome: Progressing  Goal: Receive DSME education by end of shift  Outcome: Progressing     Problem: Fall/Injury  Goal: Not fall by end of shift  Outcome: Progressing  Goal: Be free from injury by end of the shift  Outcome: Progressing  Goal: Verbalize understanding of personal risk factors for fall in the hospital  Outcome: Progressing  Goal: Verbalize understanding of risk factor reduction measures to prevent injury from fall in the home  Outcome: Progressing  Goal: Use assistive devices by end of the  shift  Outcome: Progressing  Goal: Pace activities to prevent fatigue by end of the shift  Outcome: Progressing     Problem: Pain  Goal: Takes deep breaths with improved pain control throughout the shift  Outcome: Progressing  Goal: Turns in bed with improved pain control throughout the shift  Outcome: Progressing  Goal: Walks with improved pain control throughout the shift  Outcome: Progressing  Goal: Performs ADL's with improved pain control throughout shift  Outcome: Progressing  Goal: Free from opioid side effects throughout the shift  Outcome: Progressing  Goal: Free from acute confusion related to pain meds throughout the shift  Outcome: Progressing

## 2025-02-02 NOTE — CONSULTS
[facility] Critical Care Medicine Consult Note      Date:  2/2/2025  Patient:  Chris Russell  YOB: 1930  MRN:  [id number]   Admit Date:  1/27/2025  ========================================================================================================    Chief Complaint   Patient presents with    Altered Mental Status     Pt was at home and daughter states about 3-4 hours ago he became disoriented and not making sense with his words. Daughter states he was having a hard time getting words out. Pt daughter states this episode lasted around 5 minutes and he went back to normal. Pt daughter also states that he has had a cough for 3 days which is affecting his breathing.          History of Present Illness:  Chris Russell is a 89 y.o. year old male patient who presented with complaints of difficulty breathing and confusion.  Patient informed staff that he was not feeling well over period of several days prior to presentation.  He does have cough with chest congestion.  Patient's daughter noted that the patient appeared confused to her which is what prompted evaluation in the emergency department.  Imaging suggesting pneumonia.  Patient was started on empiric antibiotic coverage with ceftriaxone/azithromycin.  From a respiratory standpoint his condition was making slow steady progress.  However, the patient began having a precipitous drop in his sodium level.  Initial value was 136 and the most recent value this morning at 112.  Patient has received tolvaptan without significant response.  I was notified this morning that they would like to initiate hypertonic saline and will need transfer to ICU.    When I came to assess the patient he was looking comfortable on nasal cannula.  He did had a fairly extensive edema on his lower extremities.  His appetite is good and he had just finished his breakfast when I entered the room.  Currently afebrile and hemodynamically stable.  Review of the patient's orders  show that he did not have a volume restriction but did have a salt restriction.    ICU course:  2/2 -transfer to ICU for hypertonic saline.  Otherwise hemodynamically stable and saturating well on nasal cannula.    Medical History:  Past Medical History:   Diagnosis Date    Coronary artery disease      Past Surgical History:   Procedure Laterality Date    ABDOMINAL AORTIC ANEURYSM REPAIR      CHOLECYSTECTOMY       Medications Prior to Admission   Medication Sig Dispense Refill Last Dose/Taking    albuterol (Ventolin HFA) 90 mcg/actuation inhaler Inhale 2 puffs every 4 hours if needed for wheezing or shortness of breath. 18 g 2 1/27/2025    atorvastatin (Lipitor) 40 mg tablet TAKE 1 TABLET BY MOUTH EVERY DAY 90 tablet 3 1/26/2025    Eliquis 5 mg tablet Take 1 tablet (5 mg) by mouth 2 times a day. 60 tablet 3 1/27/2025    multivitamin capsule Take 1 capsule by mouth once daily.   1/27/2025    tamsulosin (Flomax) 0.4 mg 24 hr capsule TAKE 2 CAPSULES BY MOUTH ONCE DAILY 180 capsule 1 1/26/2025    traZODone (Desyrel) 100 mg tablet Take 1 tablet (100 mg) by mouth as needed at bedtime for sleep. 30 tablet 5 1/26/2025    apixaban (Eliquis DVT-PE Treat 30D Start) 5 mg (74 tabs) tablets,dose pack Take 2 tablets (10 mg) by mouth every 12 hours.       benzocaine-menthoL (Cepacol Sore Throat, lyndsey-men,) 15-2.3 mg lozenge Place into mouth between cheek and gum.   Unknown    traZODone (Desyrel) 50 mg tablet TAKE 1 TABLET (50 MG) BY MOUTH ONCE DAILY AT BEDTIME. 90 tablet 0      Patient has no known allergies.  Social History     Tobacco Use    Smoking status: Never     Passive exposure: Never    Smokeless tobacco: Never   Substance Use Topics    Alcohol use: Never    Drug use: Never     Family History   Problem Relation Name Age of Onset    No Known Problems Mother      No Known Problems Father         Review of Systems:  Review of Systems   Constitutional:  Negative for chills, fever and unexpected weight change.   HENT:  Negative  for congestion and trouble swallowing.    Respiratory:  Positive for shortness of breath. Negative for cough and wheezing.    Cardiovascular:  Negative for chest pain.   Gastrointestinal:  Positive for abdominal pain. Negative for abdominal distention, constipation, diarrhea, nausea and vomiting.   Genitourinary:  Negative for difficulty urinating.   Musculoskeletal:  Negative for arthralgias and joint swelling.   Skin:  Negative for color change.   Neurological:  Positive for dizziness. Negative for speech difficulty, light-headedness and headaches.   Psychiatric/Behavioral:  Negative for confusion and sleep disturbance.        Physical Exam:    Heart Rate:  []   Temp:  [35.3 °C (95.5 °F)-36.2 °C (97.2 °F)]   Resp:  [17-25]   BP: (110-178)/(56-99)   Weight:  [110 kg (242 lb 8 oz)]   SpO2:  [97 %-100 %]     Physical Exam  Constitutional:       General: He is awake. He is not in acute distress.     Appearance: Normal appearance. He is obese. He is not ill-appearing.      Interventions: Nasal cannula in place.      Comments: Elderly appearing male appearing comfortable on nasal cannula, will ambulating with some assistance and the aid of a cane   HENT:      Head: Normocephalic and atraumatic.      Right Ear: Decreased hearing noted.      Left Ear: Decreased hearing noted.      Nose: Nose normal.      Mouth/Throat:      Mouth: Mucous membranes are moist.   Eyes:      Pupils: Pupils are equal, round, and reactive to light.   Neck:      Thyroid: No thyroid mass.      Trachea: Phonation normal.   Cardiovascular:      Rate and Rhythm: Normal rate and regular rhythm.      Heart sounds: Normal heart sounds. No murmur heard.     No gallop.   Pulmonary:      Effort: Pulmonary effort is normal. No respiratory distress.      Breath sounds: Normal air entry. Examination of the right-lower field reveals rales. Examination of the left-lower field reveals rales. Rales present. No decreased breath sounds, wheezing or rhonchi.       Comments: Rales best appreciated posteriorly  Abdominal:      General: Bowel sounds are normal. There is no distension.      Palpations: Abdomen is soft.      Tenderness: There is no abdominal tenderness.   Musculoskeletal:      Cervical back: Neck supple.      Right lower leg: 3+ Pitting Edema present.      Left lower leg: 3+ Pitting Edema present.   Skin:     General: Skin is warm.      Capillary Refill: Capillary refill takes less than 2 seconds.   Neurological:      General: No focal deficit present.      Mental Status: He is alert, oriented to person, place, and time and easily aroused. Mental status is at baseline.      Cranial Nerves: Cranial nerves 2-12 are intact.      Motor: Motor function is intact.   Psychiatric:         Attention and Perception: Attention and perception normal.         Mood and Affect: Mood normal.         Speech: Speech normal.         Behavior: Behavior normal. Behavior is cooperative.         Objective:  Labs:  Sodium has been downtrending since the beginning of the admission, most recent value 112    Radiology:    CT imaging reviewed, there are bibasilar infiltrates that are medial in nature, these are most seen in the setting of oropharyngeal dysphagia and aspiration.  No significant pleural effusion.  Upper lobes showing centrilobular emphysema.  Patient's echocardiogram reviewed showing reduced ejection fraction as well as significantly elevated RVSP    Assessment:  Severe acute  hypervolemic hypotonic hyponatremia-sodium on admission 136, rapidly declining over the course of the admission, now 112, this could be the consequence of salt restriction, diuresis and p.o. intake of free water.  Would favor polydipsia, would explain why the tolvaptan was not very effective.  Patient is relatively asymptomatic with this.  Urine studies look appropriate given the patient's current sodium.  Community acquired bibasilar pneumonia-infiltrates currently present in the aspiration  territory, would probably benefit from a speech evaluation at some point, patient completed a course of ceftriaxone/azithromycin  Probable underlying COPD-Gold stage unknown, clear evidence of centrilobular emphysema on CT imaging, currently on bronchodilator as needed  Coronary artery disease  Acute on chronic heart failure with reduced ejection fraction (35%) -patient's I/O and weights are not reflective of the patient's current volume status.  Pulmonary hypertension-RVSP 75 mmHg, suspected WHO group 2/3  Abdominal aortic aneurysm status post stenting  GERD  Hypertension  Hyperlipidemia  BPH  History of pulmonary embolism-on chronic Eliquis  Current everyday smoker-smokes 1 pack every 3 days  Obesity-BMI 31  CODE STATUS-full code    Plan:  Transfer to ICU  Currently planning on hypertonic saline administration, defer to nephrology  Will give the patient Lasix 40mg IV once now, additional diuresis per nephrology  We will liberalize the patient's diet and remove the salt restriction  1500 cc volume restriction  Strict I's and O's  Standing weights only  Titrate oxygen to maintain saturation greater than 90%  No need for further antibiotics at this point  Supportive care    :  DVT Prophylaxis: Eliquis  GI Prophylaxis: Not required  Bowel Regimen: MiraLAX, Senokot  Diet: Regular  CVC: None  Ligia: None  Short: None  Restraints: None  Dispo: ICU    Critical Care Time: 30 minutes    Tito Costa MD, MSBS   Pulmonary/Critical Care  [address] [address]  Office: [phone]  Fax: [phone]  Cell: [phone]

## 2025-02-02 NOTE — PROGRESS NOTES
Nephrology Progress Note    Assessment:  89 y.o. yo male admitted with sob.  Found to have PNA and new CHF with ef 35% and pulm HTN.  Na 136 on admission but down to 118 when initial consult done.  Looks hypervolemic.  Sodium level was dropping even before he got lasix so cannot blame entirely on lasix.  On SSRI.  Urine sg 1.019 on admission.  NO hydronephrosis noted on renal ultrasound, has nml sized kidneys      Plan:  Agree w/ giving another dose of lasix 40 mg IV x1 (Note that Khalida 24 and Uosm in the 400s and Cl trending down s/o element of volume depletion however pt appears hypervolemic and endorses orthopnea (CXR doesn't have significant edema) making picture unclear, BNP elevated as well)    Outpatient follow up from renal standpoint: TBD       Subjective:  Admit Date: 1/27/2025    Interval History: Na showing no improvement w/ lasix and tolvaptan yesterday, pt did void well yesterday evening, no significant complaints at this time, pt currently in ICU, discussed w/ daughter at bedside     Medications:  Scheduled Meds:apixaban, 5 mg, oral, BID  atorvastatin, 40 mg, oral, Nightly  guaiFENesin, 600 mg, oral, BID  insulin lispro, 0-5 Units, subcutaneous, TID AC  ipratropium-albuteroL, 3 mL, nebulization, TID  isosorbide mononitrate ER, 60 mg, oral, Daily  metoprolol succinate XL, 25 mg, oral, Daily  oxygen, , inhalation, Continuous - Inhalation  polyethylene glycol, 17 g, oral, Daily  sennosides, 2 tablet, oral, Nightly  tamsulosin, 0.8 mg, oral, Daily  traZODone, 50 mg, oral, Nightly  valsartan, 40 mg, oral, Daily      Continuous Infusions:     CBC:   Lab Results   Component Value Date    WBC 14.8 (H) 02/02/2025    RBC 3.44 (L) 02/02/2025    HGB 8.6 (L) 02/02/2025    HCT 26.0 (L) 02/02/2025    MCV 76 (L) 02/02/2025    MCH 25.0 (L) 02/02/2025    MCHC 33.1 02/02/2025    RDW 15.0 (H) 02/02/2025     (H) 02/02/2025     BMP:    Lab Results   Component Value Date     (LL) 02/02/2025    K 5.4 (H)  "02/02/2025    CL 78 (L) 02/02/2025    CO2 25 02/02/2025    BUN 31 (H) 02/02/2025    CREATININE 1.28 02/02/2025    CALCIUM 7.6 (L) 02/02/2025    GLUCOSE 181 (H) 02/02/2025       Objective:  Vitals: /60   Pulse 68   Temp 35.7 °C (96.3 °F) (Temporal)   Resp 21   Ht 1.88 m (6' 2\")   Wt 110 kg (242 lb 8 oz)   SpO2 100%   BMI 31.14 kg/m²    Wt Readings from Last 3 Encounters:   02/02/25 110 kg (242 lb 8 oz)   11/12/24 97.5 kg (215 lb)   08/13/24 97.6 kg (215 lb 3.2 oz)      24HR INTAKE/OUTPUT:    Intake/Output Summary (Last 24 hours) at 2/2/2025 1121  Last data filed at 2/2/2025 0211  Gross per 24 hour   Intake 750 ml   Output 1200 ml   Net -450 ml       General: alert, in no apparent distress  HEENT: normocephalic, atraumatic, anicteric  Lungs: non-labored respirations, rales   Heart: regular rate and rhythm, no murmurs or rubs  Abdomen: soft, non-tender, non-distended  Ext: +BLE peripheral edema  Neuro: alert, follows commands      Electronically signed by Nuria Meyers MD, MD              "

## 2025-02-02 NOTE — PROGRESS NOTES
[facility] Progress Note           Rounding Cardiologist:  Venkata Stubbs MD    Date:  2/2/2025  Patient:  Chris Russell  YOB: 1930  MRN:  [id number]   Admit Date:  1/27/2025      SUBJECTIVE:    Chris Russell was seen and examined today at bedside.     Events overnight noted, with patient being transferred to the ICU for significant hyponatremia with sodium as low as 112 although he was asymptomatic.  He still complains of shortness of breath but he denies any chest pain.      VITALS:     Vitals:    02/02/25 1000 02/02/25 1015 02/02/25 1100 02/02/25 1200   BP:  136/71 114/60 112/60   BP Location:       Patient Position:       Pulse: 80 76 68 70   Resp: 23 26 21 22   Temp:       TempSrc:       SpO2: 100% 100% 100% 100%   Weight:       Height:           Intake/Output Summary (Last 24 hours) at 2/2/2025 1225  Last data filed at 2/2/2025 0211  Gross per 24 hour   Intake 700 ml   Output 1200 ml   Net -500 ml       Wt Readings from Last 4 Encounters:   02/02/25 110 kg (242 lb 8 oz)   11/12/24 97.5 kg (215 lb)   08/13/24 97.6 kg (215 lb 3.2 oz)   05/14/24 96.3 kg (212 lb 6.4 oz)       CURRENT HOSPITAL MEDICATIONS:   apixaban, 5 mg, oral, BID  atorvastatin, 40 mg, oral, Nightly  guaiFENesin, 600 mg, oral, BID  insulin lispro, 0-5 Units, subcutaneous, TID AC  ipratropium-albuteroL, 3 mL, nebulization, TID  isosorbide mononitrate ER, 60 mg, oral, Daily  metoprolol succinate XL, 25 mg, oral, Daily  oxygen, , inhalation, Continuous - Inhalation  polyethylene glycol, 17 g, oral, Daily  sennosides, 2 tablet, oral, Nightly  tamsulosin, 0.8 mg, oral, Daily  traZODone, 50 mg, oral, Nightly  valsartan, 40 mg, oral, Daily           PHYSICAL EXAMINATION:   GENERAL:  Well developed, well nourished, in no acute distress.  CHEST:  Symmetric and nontender.  NEURO/PSYCH:  Alert and oriented times three with approppriate behavior and responses.  NECK:  Supple, no JVD, no bruit.  LUNGS: Diffuse crackles in both lung bases  "bilaterally posteriorly  HEART:  Rate and rhythm regular with no evident murmur, no gallop appreciated.        There are no rubs, clicks or heaves.  EXTREMITIES:  Warm with good color, no clubbing or cyanosis.  2+ bilateral ankle edema noted   PERIPHERAL VASCULAR:  Pulses present and equally palpable; 2+ throughout.      LAB DATA:     CBC:   Results from last 7 days   Lab Units 02/02/25  0548   WBC AUTO x10*3/uL 14.8*   RBC AUTO x10*6/uL 3.44*   HEMOGLOBIN g/dL 8.6*   HEMATOCRIT % 26.0*   PLATELETS AUTO x10*3/uL 551*        CMP:    Results from last 7 days   Lab Units 02/02/25  0548   SODIUM mmol/L 112*   POTASSIUM mmol/L 5.4*   CHLORIDE mmol/L 78*   CO2 mmol/L 25   BUN mg/dL 31*   CREATININE mg/dL 1.28   GLUCOSE mg/dL 181*   CALCIUM mg/dL 7.6*       Cardiac Enzymes:    Lab Results   Component Value Date    TROPHS 232 (HH) 01/31/2025    TROPHS 19 01/27/2025    TROPHS 23 (H) 01/27/2025       Magnesium:    Lab Results   Component Value Date    MG 2.08 02/02/2025       Lipid Profile:  No results found for: \"CHLPL\", \"TRIG\", \"HDL\", \"LDLCALC\", \"LDLDIRECT\"    TSH:  No results found for: \"TSH\"    BNP:   Lab Results   Component Value Date    BNP 2,512 (H) 02/01/2025        PT/INR:  No results found for: \"PROTIME\", \"INR\"    HgBA1c:    Lab Results   Component Value Date    HGBA1C 6.3 (H) 12/20/2024       CBC:  Lab Results   Component Value Date    WBC 14.8 (H) 02/02/2025    WBC 13.8 (H) 02/01/2025    WBC 11.5 (H) 01/31/2025    RBC 3.44 (L) 02/02/2025    RBC 3.39 (L) 02/01/2025    RBC 3.52 (L) 01/31/2025    HGB 8.6 (L) 02/02/2025    HGB 8.7 (L) 02/01/2025    HGB 8.8 (L) 01/31/2025    HCT 26.0 (L) 02/02/2025    HCT 26.4 (L) 02/01/2025    HCT 27.2 (L) 01/31/2025    MCV 76 (L) 02/02/2025    MCV 78 (L) 02/01/2025    MCV 77 (L) 01/31/2025    MCH 25.0 (L) 02/02/2025    MCH 25.7 (L) 02/01/2025    MCH 25.0 (L) 01/31/2025    MCHC 33.1 02/02/2025    MCHC 33.0 02/01/2025    MCHC 32.4 01/31/2025    RDW 15.0 (H) 02/02/2025    RDW 15.2 (H) " "02/01/2025    RDW 15.1 (H) 01/31/2025     (H) 02/02/2025     (H) 02/01/2025     (H) 01/31/2025       BMP:  Lab Results   Component Value Date     (LL) 02/02/2025     (LL) 02/01/2025     (LL) 02/01/2025    K 5.4 (H) 02/02/2025    K 5.2 02/01/2025    K 5.5 (H) 02/01/2025    CL 78 (L) 02/02/2025    CL 80 (L) 02/01/2025    CL 82 (L) 02/01/2025    CO2 25 02/02/2025    CO2 22 02/01/2025    CO2 22 02/01/2025    BUN 31 (H) 02/02/2025    BUN 30 (H) 02/01/2025    BUN 28 (H) 02/01/2025    CREATININE 1.28 02/02/2025    CREATININE 1.24 02/01/2025    CREATININE 1.19 02/01/2025       Hepatic Function Panel:  No results found for: \"ALKPHOS\", \"ALT\", \"AST\", \"PROT\", \"BILITOT\", \"BILIDIR\"    DIAGNOSTIC TESTING:     ECG 12 Lead    Result Date: 1/27/2025  Sinus rhythm with Blocked Premature atrial complexes with occasional Premature ventricular complexes Low voltage QRS Left anterior fascicular block Possible Anterolateral infarct (cited on or before 20-JUN-2023) Abnormal ECG When compared with ECG of 20-JUN-2023 02:35, Premature ventricular complexes are now Present Premature atrial complexes are now Present Left anterior fascicular block is now Present Questionable change in initial forces of Anterolateral leads    XR chest 1 view    Result Date: 1/27/2025  Interpreted By:  Darrion Crowley, STUDY: XR CHEST 1 VIEW  1/27/2025 2:25 pm   INDICATION: Signs/Symptoms:AMS   COMPARISON: 06/04/2024   ACCESSION NUMBER(S): [id number]   ORDERING CLINICIAN: PAL THORNTON   TECHNIQUE: A single AP portable radiograph of the chest was obtained.   FINDINGS: Multiple cardiac monitoring leads are seen over the chest.  Bibasilar airspace consolidations are seen and may represent small pleural effusions, atelectasis and/or pneumonia. No pneumothorax is identified. The cardiac silhouette is within normal limits for size.       Bibasilar airspace opacities, as above. Clinical correlation and continued follow-up until " clearing is recommended.   MACRO: None.   Signed by: Darrion Crowley 1/27/2025 2:37 PM Dictation workstation:   [id number]      PROBLEM LIST     Patient Active Problem List   Diagnosis    Mixed hyperlipidemia    Benign prostatic hyperplasia with urinary frequency    AK (actinic keratosis)    Vertigo    Bilateral tinnitus    Controlled type 2 diabetes mellitus without complication, with long-term current use of insulin (Multi)    Obstructive sleep apnea syndrome    Bilateral hearing loss    Type 2 diabetes mellitus with hyperglycemia, without long-term current use of insulin    Community acquired pneumonia, unspecified laterality       ASSESSMENT & PLAN:   Acute CHF exacerbation: With gross volume overload leading to dilutional hyponatremia, currently being treated with fluid restriction and IV Lasix.  Will defer to nephrology with regards to use of hypertonic saline.  Paroxysmal atrial flutter: The patient had intermittent episodes of atrial flutter, but with adequate rate control and he is already on Eliquis for anticoagulation which we will continue.  Chest pain: Likely secondary to underlying CAD but patient has declined invasive cardiac catheterization as previously noted.  We will continue with current cardiac medications.        Please do not hesitate to call with questions.  Electronically signed by Venkata Stubbs MD, on 2/2/2025 at 12:25 PM

## 2025-02-03 ENCOUNTER — APPOINTMENT (OUTPATIENT)
Dept: CARDIOLOGY | Facility: HOSPITAL | Age: OVER 89
End: 2025-02-03
Payer: MEDICARE

## 2025-02-03 ENCOUNTER — APPOINTMENT (OUTPATIENT)
Dept: RADIOLOGY | Facility: HOSPITAL | Age: OVER 89
End: 2025-02-03
Payer: MEDICARE

## 2025-02-03 VITALS
SYSTOLIC BLOOD PRESSURE: 132 MMHG | HEIGHT: 74 IN | BODY MASS INDEX: 31.12 KG/M2 | DIASTOLIC BLOOD PRESSURE: 73 MMHG | RESPIRATION RATE: 27 BRPM | WEIGHT: 242.5 LBS | OXYGEN SATURATION: 96 % | TEMPERATURE: 97.2 F

## 2025-02-03 DIAGNOSIS — F51.01 PRIMARY INSOMNIA: Primary | ICD-10-CM

## 2025-02-03 LAB
ANION GAP SERPL CALC-SCNC: 13 MMOL/L (ref 10–20)
ANION GAP SERPL CALC-SCNC: 14 MMOL/L (ref 10–20)
ANION GAP SERPL CALC-SCNC: 17 MMOL/L (ref 10–20)
ANION GAP SERPL CALC-SCNC: 19 MMOL/L (ref 10–20)
ATRIAL RATE: 238 BPM
ATRIAL RATE: 65 BPM
ATRIAL RATE: 77 BPM
BASOPHILS # BLD AUTO: 0.03 X10*3/UL (ref 0–0.1)
BASOPHILS NFR BLD AUTO: 0.2 %
BUN SERPL-MCNC: 33 MG/DL (ref 6–23)
BUN SERPL-MCNC: 35 MG/DL (ref 6–23)
BUN SERPL-MCNC: 38 MG/DL (ref 6–23)
BUN SERPL-MCNC: 39 MG/DL (ref 6–23)
CALCIUM SERPL-MCNC: 7.1 MG/DL (ref 8.6–10.3)
CALCIUM SERPL-MCNC: 7.5 MG/DL (ref 8.6–10.3)
CHLORIDE SERPL-SCNC: 81 MMOL/L (ref 98–107)
CHLORIDE SERPL-SCNC: 81 MMOL/L (ref 98–107)
CHLORIDE SERPL-SCNC: 83 MMOL/L (ref 98–107)
CHLORIDE SERPL-SCNC: 83 MMOL/L (ref 98–107)
CO2 SERPL-SCNC: 17 MMOL/L (ref 21–32)
CO2 SERPL-SCNC: 22 MMOL/L (ref 21–32)
CO2 SERPL-SCNC: 22 MMOL/L (ref 21–32)
CO2 SERPL-SCNC: 26 MMOL/L (ref 21–32)
CREAT SERPL-MCNC: 1.26 MG/DL (ref 0.5–1.3)
CREAT SERPL-MCNC: 1.29 MG/DL (ref 0.5–1.3)
CREAT SERPL-MCNC: 1.32 MG/DL (ref 0.5–1.3)
CREAT SERPL-MCNC: 1.36 MG/DL (ref 0.5–1.3)
EGFRCR SERPLBLD CKD-EPI 2021: 48 ML/MIN/1.73M*2
EGFRCR SERPLBLD CKD-EPI 2021: 50 ML/MIN/1.73M*2
EGFRCR SERPLBLD CKD-EPI 2021: 51 ML/MIN/1.73M*2
EGFRCR SERPLBLD CKD-EPI 2021: 53 ML/MIN/1.73M*2
EOSINOPHIL # BLD AUTO: 0.03 X10*3/UL (ref 0–0.4)
EOSINOPHIL NFR BLD AUTO: 0.2 %
ERYTHROCYTE [DISTWIDTH] IN BLOOD BY AUTOMATED COUNT: 15.2 % (ref 11.5–14.5)
GLUCOSE BLD MANUAL STRIP-MCNC: 235 MG/DL (ref 74–99)
GLUCOSE BLD MANUAL STRIP-MCNC: 239 MG/DL (ref 74–99)
GLUCOSE BLD MANUAL STRIP-MCNC: 242 MG/DL (ref 74–99)
GLUCOSE SERPL-MCNC: 143 MG/DL (ref 74–99)
GLUCOSE SERPL-MCNC: 159 MG/DL (ref 74–99)
GLUCOSE SERPL-MCNC: 234 MG/DL (ref 74–99)
GLUCOSE SERPL-MCNC: 255 MG/DL (ref 74–99)
HCT VFR BLD AUTO: 25.1 % (ref 41–52)
HGB BLD-MCNC: 8.3 G/DL (ref 13.5–17.5)
IMM GRANULOCYTES # BLD AUTO: 0.72 X10*3/UL (ref 0–0.5)
IMM GRANULOCYTES NFR BLD AUTO: 4.2 % (ref 0–0.9)
LYMPHOCYTES # BLD AUTO: 4.02 X10*3/UL (ref 0.8–3)
LYMPHOCYTES NFR BLD AUTO: 23.4 %
MAGNESIUM SERPL-MCNC: 2.22 MG/DL (ref 1.6–2.4)
MCH RBC QN AUTO: 25.3 PG (ref 26–34)
MCHC RBC AUTO-ENTMCNC: 33.1 G/DL (ref 32–36)
MCV RBC AUTO: 77 FL (ref 80–100)
MONOCYTES # BLD AUTO: 1.49 X10*3/UL (ref 0.05–0.8)
MONOCYTES NFR BLD AUTO: 8.7 %
MRSA DNA SPEC QL NAA+PROBE: NOT DETECTED
NEUTROPHILS # BLD AUTO: 10.87 X10*3/UL (ref 1.6–5.5)
NEUTROPHILS NFR BLD AUTO: 63.3 %
NRBC BLD-RTO: 0 /100 WBCS (ref 0–0)
P AXIS: -81 DEGREES
P AXIS: 66 DEGREES
P OFFSET: 108 MS
P OFFSET: 192 MS
P ONSET: 141 MS
P ONSET: 68 MS
PLATELET # BLD AUTO: 497 X10*3/UL (ref 150–450)
POTASSIUM SERPL-SCNC: 5.1 MMOL/L (ref 3.5–5.3)
POTASSIUM SERPL-SCNC: 5.2 MMOL/L (ref 3.5–5.3)
POTASSIUM SERPL-SCNC: 5.3 MMOL/L (ref 3.5–5.3)
POTASSIUM SERPL-SCNC: 5.8 MMOL/L (ref 3.5–5.3)
PR INTERVAL: 140 MS
PR INTERVAL: 290 MS
PROCALCITONIN SERPL-MCNC: 3 NG/ML
Q ONSET: 211 MS
Q ONSET: 213 MS
Q ONSET: 213 MS
QRS COUNT: 11 BEATS
QRS COUNT: 13 BEATS
QRS COUNT: 20 BEATS
QRS DURATION: 84 MS
QRS DURATION: 86 MS
QRS DURATION: 92 MS
QT INTERVAL: 332 MS
QT INTERVAL: 362 MS
QT INTERVAL: 374 MS
QTC CALCULATION(BAZETT): 388 MS
QTC CALCULATION(BAZETT): 409 MS
QTC CALCULATION(BAZETT): 467 MS
QTC FREDERICIA: 383 MS
QTC FREDERICIA: 393 MS
QTC FREDERICIA: 417 MS
R AXIS: -31 DEGREES
R AXIS: -61 DEGREES
R AXIS: -71 DEGREES
RBC # BLD AUTO: 3.28 X10*6/UL (ref 4.5–5.9)
SODIUM SERPL-SCNC: 112 MMOL/L (ref 136–145)
SODIUM SERPL-SCNC: 113 MMOL/L (ref 136–145)
SODIUM SERPL-SCNC: 115 MMOL/L (ref 136–145)
SODIUM SERPL-SCNC: 117 MMOL/L (ref 136–145)
T AXIS: -58 DEGREES
T AXIS: -65 DEGREES
T AXIS: 247 DEGREES
T OFFSET: 379 MS
T OFFSET: 392 MS
T OFFSET: 400 MS
VANCOMYCIN SERPL-MCNC: 11.8 UG/ML (ref 5–20)
VANCOMYCIN SERPL-MCNC: 17.7 UG/ML (ref 5–20)
VENTRICULAR RATE: 119 BPM
VENTRICULAR RATE: 65 BPM
VENTRICULAR RATE: 77 BPM
WBC # BLD AUTO: 17.2 X10*3/UL (ref 4.4–11.3)

## 2025-02-03 PROCEDURE — 2500000002 HC RX 250 W HCPCS SELF ADMINISTERED DRUGS (ALT 637 FOR MEDICARE OP, ALT 636 FOR OP/ED)

## 2025-02-03 PROCEDURE — 80202 ASSAY OF VANCOMYCIN: CPT

## 2025-02-03 PROCEDURE — 2500000004 HC RX 250 GENERAL PHARMACY W/ HCPCS (ALT 636 FOR OP/ED): Performed by: STUDENT IN AN ORGANIZED HEALTH CARE EDUCATION/TRAINING PROGRAM

## 2025-02-03 PROCEDURE — 71045 X-RAY EXAM CHEST 1 VIEW: CPT | Performed by: RADIOLOGY

## 2025-02-03 PROCEDURE — 80048 BASIC METABOLIC PNL TOTAL CA: CPT

## 2025-02-03 PROCEDURE — 94640 AIRWAY INHALATION TREATMENT: CPT

## 2025-02-03 PROCEDURE — 83735 ASSAY OF MAGNESIUM: CPT | Performed by: STUDENT IN AN ORGANIZED HEALTH CARE EDUCATION/TRAINING PROGRAM

## 2025-02-03 PROCEDURE — 85025 COMPLETE CBC W/AUTO DIFF WBC: CPT | Performed by: STUDENT IN AN ORGANIZED HEALTH CARE EDUCATION/TRAINING PROGRAM

## 2025-02-03 PROCEDURE — 99233 SBSQ HOSP IP/OBS HIGH 50: CPT | Performed by: INTERNAL MEDICINE

## 2025-02-03 PROCEDURE — 2580000001 HC RX 258 IV SOLUTIONS: Performed by: NURSE PRACTITIONER

## 2025-02-03 PROCEDURE — 2500000002 HC RX 250 W HCPCS SELF ADMINISTERED DRUGS (ALT 637 FOR MEDICARE OP, ALT 636 FOR OP/ED): Performed by: STUDENT IN AN ORGANIZED HEALTH CARE EDUCATION/TRAINING PROGRAM

## 2025-02-03 PROCEDURE — 74230 X-RAY XM SWLNG FUNCJ C+: CPT

## 2025-02-03 PROCEDURE — 93010 ELECTROCARDIOGRAM REPORT: CPT | Performed by: INTERNAL MEDICINE

## 2025-02-03 PROCEDURE — 84145 PROCALCITONIN (PCT): CPT | Mod: ELYLAB

## 2025-02-03 PROCEDURE — 80048 BASIC METABOLIC PNL TOTAL CA: CPT | Performed by: STUDENT IN AN ORGANIZED HEALTH CARE EDUCATION/TRAINING PROGRAM

## 2025-02-03 PROCEDURE — 99221 1ST HOSP IP/OBS SF/LOW 40: CPT | Performed by: NURSE PRACTITIONER

## 2025-02-03 PROCEDURE — 87641 MR-STAPH DNA AMP PROBE: CPT

## 2025-02-03 PROCEDURE — 82947 ASSAY GLUCOSE BLOOD QUANT: CPT

## 2025-02-03 PROCEDURE — 71045 X-RAY EXAM CHEST 1 VIEW: CPT

## 2025-02-03 PROCEDURE — 2500000001 HC RX 250 WO HCPCS SELF ADMINISTERED DRUGS (ALT 637 FOR MEDICARE OP): Performed by: STUDENT IN AN ORGANIZED HEALTH CARE EDUCATION/TRAINING PROGRAM

## 2025-02-03 PROCEDURE — 99291 CRITICAL CARE FIRST HOUR: CPT

## 2025-02-03 PROCEDURE — 2500000005 HC RX 250 GENERAL PHARMACY W/O HCPCS: Performed by: STUDENT IN AN ORGANIZED HEALTH CARE EDUCATION/TRAINING PROGRAM

## 2025-02-03 PROCEDURE — 92610 EVALUATE SWALLOWING FUNCTION: CPT | Mod: GN

## 2025-02-03 PROCEDURE — 83930 ASSAY OF BLOOD OSMOLALITY: CPT | Mod: ELYLAB

## 2025-02-03 PROCEDURE — 36415 COLL VENOUS BLD VENIPUNCTURE: CPT | Performed by: STUDENT IN AN ORGANIZED HEALTH CARE EDUCATION/TRAINING PROGRAM

## 2025-02-03 PROCEDURE — 97530 THERAPEUTIC ACTIVITIES: CPT | Mod: GP,CQ

## 2025-02-03 PROCEDURE — 92611 MOTION FLUOROSCOPY/SWALLOW: CPT | Mod: GN

## 2025-02-03 PROCEDURE — 94660 CPAP INITIATION&MGMT: CPT

## 2025-02-03 PROCEDURE — 2500000005 HC RX 250 GENERAL PHARMACY W/O HCPCS

## 2025-02-03 PROCEDURE — 93005 ELECTROCARDIOGRAM TRACING: CPT

## 2025-02-03 PROCEDURE — 2500000004 HC RX 250 GENERAL PHARMACY W/ HCPCS (ALT 636 FOR OP/ED): Performed by: NURSE PRACTITIONER

## 2025-02-03 PROCEDURE — 2500000004 HC RX 250 GENERAL PHARMACY W/ HCPCS (ALT 636 FOR OP/ED)

## 2025-02-03 PROCEDURE — 87070 CULTURE OTHR SPECIMN AEROBIC: CPT | Mod: ELYLAB

## 2025-02-03 PROCEDURE — 2020000001 HC ICU ROOM DAILY

## 2025-02-03 PROCEDURE — 74230 X-RAY XM SWLNG FUNCJ C+: CPT | Performed by: RADIOLOGY

## 2025-02-03 RX ORDER — DEXTROSE MONOHYDRATE 100 MG/ML
50 INJECTION, SOLUTION INTRAVENOUS CONTINUOUS
Status: ACTIVE | OUTPATIENT
Start: 2025-02-03 | End: 2025-02-03

## 2025-02-03 RX ORDER — DEXTROSE 50 % IN WATER (D50W) INTRAVENOUS SYRINGE
25 ONCE
Status: COMPLETED | OUTPATIENT
Start: 2025-02-03 | End: 2025-02-03

## 2025-02-03 RX ORDER — PANTOPRAZOLE SODIUM 40 MG/1
40 TABLET, DELAYED RELEASE ORAL
Status: DISCONTINUED | OUTPATIENT
Start: 2025-02-04 | End: 2025-02-03

## 2025-02-03 RX ORDER — INSULIN LISPRO 100 [IU]/ML
3 INJECTION, SOLUTION INTRAVENOUS; SUBCUTANEOUS ONCE
Status: COMPLETED | OUTPATIENT
Start: 2025-02-03 | End: 2025-02-03

## 2025-02-03 RX ORDER — SODIUM CHLORIDE 9 MG/ML
50 INJECTION, SOLUTION INTRAVENOUS CONTINUOUS
Status: ACTIVE | OUTPATIENT
Start: 2025-02-03 | End: 2025-02-04

## 2025-02-03 RX ORDER — METOPROLOL TARTRATE 1 MG/ML
2.5 INJECTION, SOLUTION INTRAVENOUS ONCE
Status: COMPLETED | OUTPATIENT
Start: 2025-02-03 | End: 2025-02-03

## 2025-02-03 RX ORDER — METOPROLOL SUCCINATE 25 MG/1
25 TABLET, EXTENDED RELEASE ORAL DAILY
Status: DISCONTINUED | OUTPATIENT
Start: 2025-02-04 | End: 2025-02-07 | Stop reason: HOSPADM

## 2025-02-03 RX ORDER — TRAZODONE HYDROCHLORIDE 100 MG/1
100 TABLET ORAL NIGHTLY PRN
Qty: 30 TABLET | Refills: 5 | Status: SHIPPED | OUTPATIENT
Start: 2025-02-03 | End: 2026-02-03

## 2025-02-03 RX ORDER — SODIUM CHLORIDE 9 MG/ML
50 INJECTION, SOLUTION INTRAVENOUS CONTINUOUS
Status: ACTIVE | OUTPATIENT
Start: 2025-02-03 | End: 2025-02-03

## 2025-02-03 RX ORDER — LABETALOL HYDROCHLORIDE 5 MG/ML
10 INJECTION, SOLUTION INTRAVENOUS EVERY 6 HOURS PRN
Status: DISCONTINUED | OUTPATIENT
Start: 2025-02-03 | End: 2025-02-05

## 2025-02-03 RX ORDER — METOPROLOL SUCCINATE 50 MG/1
50 TABLET, EXTENDED RELEASE ORAL DAILY
Status: DISCONTINUED | OUTPATIENT
Start: 2025-02-04 | End: 2025-02-03

## 2025-02-03 RX ORDER — HYDRALAZINE HYDROCHLORIDE 20 MG/ML
10 INJECTION INTRAMUSCULAR; INTRAVENOUS EVERY 6 HOURS PRN
Status: DISCONTINUED | OUTPATIENT
Start: 2025-02-03 | End: 2025-02-05

## 2025-02-03 RX ORDER — IPRATROPIUM BROMIDE AND ALBUTEROL SULFATE 2.5; .5 MG/3ML; MG/3ML
3 SOLUTION RESPIRATORY (INHALATION) EVERY 4 HOURS PRN
Status: DISCONTINUED | OUTPATIENT
Start: 2025-02-03 | End: 2025-02-07 | Stop reason: HOSPADM

## 2025-02-03 RX ORDER — VANCOMYCIN HYDROCHLORIDE 1 G/20ML
INJECTION, POWDER, LYOPHILIZED, FOR SOLUTION INTRAVENOUS DAILY PRN
Status: DISCONTINUED | OUTPATIENT
Start: 2025-02-03 | End: 2025-02-04

## 2025-02-03 RX ORDER — FUROSEMIDE 10 MG/ML
40 INJECTION INTRAMUSCULAR; INTRAVENOUS ONCE
Status: COMPLETED | OUTPATIENT
Start: 2025-02-03 | End: 2025-02-03

## 2025-02-03 RX ORDER — METOPROLOL TARTRATE 25 MG/1
25 TABLET, FILM COATED ORAL ONCE
Status: DISCONTINUED | OUTPATIENT
Start: 2025-02-03 | End: 2025-02-04

## 2025-02-03 RX ADMIN — INSULIN LISPRO 3 UNITS: 100 INJECTION, SOLUTION INTRAVENOUS; SUBCUTANEOUS at 08:34

## 2025-02-03 RX ADMIN — METHYLPREDNISOLONE SODIUM SUCCINATE 125 MG: 125 INJECTION, POWDER, FOR SOLUTION INTRAMUSCULAR; INTRAVENOUS at 05:32

## 2025-02-03 RX ADMIN — IPRATROPIUM BROMIDE AND ALBUTEROL SULFATE 3 ML: .5; 3 SOLUTION RESPIRATORY (INHALATION) at 05:07

## 2025-02-03 RX ADMIN — INSULIN LISPRO 3 UNITS: 100 INJECTION, SOLUTION INTRAVENOUS; SUBCUTANEOUS at 12:14

## 2025-02-03 RX ADMIN — TRAZODONE HYDROCHLORIDE 100 MG: 50 TABLET ORAL at 20:08

## 2025-02-03 RX ADMIN — FUROSEMIDE 40 MG: 10 INJECTION, SOLUTION INTRAVENOUS at 04:58

## 2025-02-03 RX ADMIN — STANDARDIZED SENNA CONCENTRATE 17.2 MG: 8.6 TABLET ORAL at 20:07

## 2025-02-03 RX ADMIN — INSULIN LISPRO 2 UNITS: 100 INJECTION, SOLUTION INTRAVENOUS; SUBCUTANEOUS at 16:48

## 2025-02-03 RX ADMIN — ATORVASTATIN CALCIUM 40 MG: 20 TABLET, FILM COATED ORAL at 20:07

## 2025-02-03 RX ADMIN — SODIUM CHLORIDE 100 ML: 3 INJECTION, SOLUTION INTRAVENOUS at 01:29

## 2025-02-03 RX ADMIN — INSULIN HUMAN 10 UNITS: 100 INJECTION, SOLUTION PARENTERAL at 12:18

## 2025-02-03 RX ADMIN — SODIUM CHLORIDE 50 ML/HR: 9 INJECTION, SOLUTION INTRAVENOUS at 11:59

## 2025-02-03 RX ADMIN — BARIUM SULFATE 10 ML: 400 PASTE ORAL at 11:43

## 2025-02-03 RX ADMIN — POLYETHYLENE GLYCOL 3350 17 G: 17 POWDER, FOR SOLUTION ORAL at 08:34

## 2025-02-03 RX ADMIN — VALSARTAN 40 MG: 80 TABLET, FILM COATED ORAL at 08:34

## 2025-02-03 RX ADMIN — SODIUM ZIRCONIUM CYCLOSILICATE 10 G: 10 POWDER, FOR SUSPENSION ORAL at 12:03

## 2025-02-03 RX ADMIN — APIXABAN 5 MG: 5 TABLET, FILM COATED ORAL at 08:34

## 2025-02-03 RX ADMIN — METOPROLOL TARTRATE 2.5 MG: 5 INJECTION INTRAVENOUS at 06:00

## 2025-02-03 RX ADMIN — TRAZODONE HYDROCHLORIDE 50 MG: 50 TABLET ORAL at 20:07

## 2025-02-03 RX ADMIN — PIPERACILLIN SODIUM AND TAZOBACTAM SODIUM 3.38 G: 3; .375 INJECTION, SOLUTION INTRAVENOUS at 11:58

## 2025-02-03 RX ADMIN — BARIUM SULFATE 150 ML: 0.81 POWDER, FOR SUSPENSION ORAL at 11:44

## 2025-02-03 RX ADMIN — Medication 4 L/MIN: at 09:02

## 2025-02-03 RX ADMIN — SODIUM CHLORIDE 50 ML/HR: 9 INJECTION, SOLUTION INTRAVENOUS at 23:22

## 2025-02-03 RX ADMIN — METOPROLOL SUCCINATE 25 MG: 25 TABLET, EXTENDED RELEASE ORAL at 08:34

## 2025-02-03 RX ADMIN — PIPERACILLIN SODIUM AND TAZOBACTAM SODIUM 3.38 G: 3; .375 INJECTION, SOLUTION INTRAVENOUS at 16:48

## 2025-02-03 RX ADMIN — DEXTROSE MONOHYDRATE 25 G: 25 INJECTION, SOLUTION INTRAVENOUS at 12:31

## 2025-02-03 RX ADMIN — IPRATROPIUM BROMIDE AND ALBUTEROL SULFATE 3 ML: .5; 3 SOLUTION RESPIRATORY (INHALATION) at 19:41

## 2025-02-03 RX ADMIN — APIXABAN 5 MG: 5 TABLET, FILM COATED ORAL at 20:08

## 2025-02-03 RX ADMIN — Medication 4 L/MIN: at 12:44

## 2025-02-03 RX ADMIN — ACETAMINOPHEN 650 MG: 325 TABLET ORAL at 12:33

## 2025-02-03 RX ADMIN — TAMSULOSIN HYDROCHLORIDE 0.8 MG: 0.4 CAPSULE ORAL at 08:34

## 2025-02-03 RX ADMIN — ISOSORBIDE MONONITRATE 60 MG: 60 TABLET, EXTENDED RELEASE ORAL at 08:34

## 2025-02-03 RX ADMIN — SODIUM ZIRCONIUM CYCLOSILICATE 10 G: 10 POWDER, FOR SUSPENSION ORAL at 20:08

## 2025-02-03 RX ADMIN — VANCOMYCIN HYDROCHLORIDE 1500 MG: 1.5 INJECTION, POWDER, LYOPHILIZED, FOR SOLUTION INTRAVENOUS at 11:58

## 2025-02-03 RX ADMIN — Medication 3 L/MIN: at 00:17

## 2025-02-03 RX ADMIN — INSULIN LISPRO 3 UNITS: 100 INJECTION, SOLUTION INTRAVENOUS; SUBCUTANEOUS at 18:38

## 2025-02-03 RX ADMIN — GUAIFENESIN 600 MG: 600 TABLET, EXTENDED RELEASE ORAL at 08:34

## 2025-02-03 RX ADMIN — Medication 4 L/MIN: at 20:08

## 2025-02-03 RX ADMIN — IPRATROPIUM BROMIDE AND ALBUTEROL SULFATE 3 ML: .5; 3 SOLUTION RESPIRATORY (INHALATION) at 12:44

## 2025-02-03 RX ADMIN — HYDRALAZINE HYDROCHLORIDE 10 MG: 20 INJECTION INTRAMUSCULAR; INTRAVENOUS at 04:51

## 2025-02-03 RX ADMIN — GUAIFENESIN 600 MG: 600 TABLET, EXTENDED RELEASE ORAL at 20:07

## 2025-02-03 ASSESSMENT — COGNITIVE AND FUNCTIONAL STATUS - GENERAL
MOBILITY SCORE: 15
DRESSING REGULAR LOWER BODY CLOTHING: A LITTLE
MOVING TO AND FROM BED TO CHAIR: A LOT
CLIMB 3 TO 5 STEPS WITH RAILING: A LOT
STANDING UP FROM CHAIR USING ARMS: A LOT
CLIMB 3 TO 5 STEPS WITH RAILING: TOTAL
DAILY ACTIVITIY SCORE: 15
MOVING TO AND FROM BED TO CHAIR: A LITTLE
TURNING FROM BACK TO SIDE WHILE IN FLAT BAD: A LOT
TOILETING: A LITTLE
EATING MEALS: A LITTLE
TOILETING: A LOT
STANDING UP FROM CHAIR USING ARMS: A LITTLE
CLIMB 3 TO 5 STEPS WITH RAILING: A LOT
DRESSING REGULAR LOWER BODY CLOTHING: A LITTLE
STANDING UP FROM CHAIR USING ARMS: A LOT
MOVING FROM LYING ON BACK TO SITTING ON SIDE OF FLAT BED WITH BEDRAILS: A LITTLE
HELP NEEDED FOR BATHING: A LOT
DRESSING REGULAR LOWER BODY CLOTHING: A LOT
PERSONAL GROOMING: A LITTLE
PERSONAL GROOMING: A LITTLE
WALKING IN HOSPITAL ROOM: A LOT
DAILY ACTIVITIY SCORE: 18
DAILY ACTIVITIY SCORE: 19
WALKING IN HOSPITAL ROOM: A LOT
EATING MEALS: A LITTLE
MOVING FROM LYING ON BACK TO SITTING ON SIDE OF FLAT BED WITH BEDRAILS: A LITTLE
MOBILITY SCORE: 15
DRESSING REGULAR UPPER BODY CLOTHING: A LITTLE
HELP NEEDED FOR BATHING: A LITTLE
DRESSING REGULAR UPPER BODY CLOTHING: A LITTLE
TOILETING: A LITTLE
WALKING IN HOSPITAL ROOM: A LITTLE
HELP NEEDED FOR BATHING: A LITTLE
PERSONAL GROOMING: A LITTLE
MOBILITY SCORE: 15
DRESSING REGULAR UPPER BODY CLOTHING: A LITTLE
TURNING FROM BACK TO SIDE WHILE IN FLAT BAD: A LITTLE
MOVING TO AND FROM BED TO CHAIR: A LOT

## 2025-02-03 ASSESSMENT — PAIN SCALES - GENERAL
PAINLEVEL_OUTOF10: 3
PAINLEVEL_OUTOF10: 7
PAINLEVEL_OUTOF10: 0 - NO PAIN
PAINLEVEL_OUTOF10: 2
PAINLEVEL_OUTOF10: 0 - NO PAIN
PAINLEVEL_OUTOF10: 0 - NO PAIN

## 2025-02-03 ASSESSMENT — PAIN - FUNCTIONAL ASSESSMENT
PAIN_FUNCTIONAL_ASSESSMENT: 0-10

## 2025-02-03 NOTE — NURSING NOTE
Patient attempting to get out of bed several times to void while swinging at staff. Confused and unsteady on feet, C/O shortness of breath, diaphoretic, heart rate 128, /100, O2 increased to 4L/NC, continuously ripping off all monitor wires. Soft wrist restraints applied for safety, maria del carmen NP at bedside, stat ECG, PCXR done at bedside, lasix given.

## 2025-02-03 NOTE — PROGRESS NOTES
1055 Care transitions update:  per request , family would like to revisit and discuss dc planning needs and options.   I met with patients , Daughter at bedside.  She voices concerns about her fathers needs at discharge and states she feels he would benefit from going to snf at time of discharge . Pt resides with daughter at home. Skilled nursing facility list has been printed from [facility] and provided for her to look over.  Agreeable to care transitions follow up for snf choice.

## 2025-02-03 NOTE — CARE PLAN
Problem: Safety - Adult  Goal: Free from fall injury  Outcome: Progressing     Problem: Discharge Planning  Goal: Discharge to home or other facility with appropriate resources  Outcome: Progressing     Problem: Chronic Conditions and Co-morbidities  Goal: Patient's chronic conditions and co-morbidity symptoms are monitored and maintained or improved  Outcome: Progressing     Problem: Nutrition  Goal: Nutrient intake appropriate for maintaining nutritional needs  Outcome: Progressing   The patient's goals for the shift include to be able to get home    The clinical goals for the shift include patient will remain safe and free from falls and sodium levels will improve

## 2025-02-03 NOTE — CARE PLAN
The patient's goals for the shift include to be able to get home    The clinical goals for the shift include patient will remain safe and free from falls and sodium levels will improve    Over the shift, the patient did make progress toward the following goals.

## 2025-02-03 NOTE — CONSULTS
Vancomycin Dosing by Pharmacy- INITIAL    Chris Russell is a 89 y.o. year old male who Pharmacy has been consulted for vancomycin dosing for pneumonia. Based on the patient's indication and renal status this patient will be dosed based on a goal AUC of 400-600.     Renal function is currently stable.    Visit Vitals  BP (!) 175/105   Pulse 88   Temp 36.8 °C (98.2 °F)   Resp 21        Lab Results   Component Value Date    CREATININE 1.29 2025    CREATININE 1.36 (H) 2025    CREATININE 1.31 (H) 2025    CREATININE 1.28 2025        Patient weight is as follows:   Vitals:    25 0500   Weight: 105 kg (231 lb 4.2 oz)       Cultures:  No results found for the encounter in last 14 days.        I/O last 3 completed shifts:  In: 1962 (18.7 mL/kg) [P.O.:1862; IV Piggyback:100]  Out: 2900 (27.6 mL/kg) [Urine:2900 (0.8 mL/kg/hr)]  Weight: 104.9 kg   I/O during current shift:  I/O this shift:  In: -   Out: 700 [Urine:700]    Temp (24hrs), Av.5 °C (97.7 °F), Min:36.2 °C (97.2 °F), Max:36.8 °C (98.2 °F)         Assessment/Plan     Patient will be given a loading dose of 1,500 mg.  Will initiate vancomycin maintenance,  1,250 mg every 24 hours.    This dosing regimen is predicted by [employer] to result in the following pharmacokinetic parameters:  Loading dose: 1500 mg at 11:00 2025.  Regimen: 1250 mg IV every 24 hours.  Start time: 11:00 on 2025  Exposure target: AUC24 (range)400-600 mg/L.hr   [id number]: 413 mg/L.hr  AUC24,ss: 535 mg/L.hr  Probability of AUC24 > 400: 80 %  Ctrough,ss: 17.5 mg/L  Probability of Ctrough,ss > 20: 38 %    Follow-up level will be ordered on 2/3/25 at 1500 unless clinically indicated sooner.  Will continue to monitor renal function daily while on vancomycin and order serum creatinine at least every 48 hours if not already ordered.  Follow for continued vancomycin needs, clinical response, and signs/symptoms of toxicity.     Shweta Gomez, PharmD

## 2025-02-03 NOTE — PROGRESS NOTES
Physical Therapy    Physical Therapy Treatment    Patient Name: Chris Russell  MRN: [id number]  Department: [facility]  Room: 09/09-A  Today's Date: 2/3/2025  Time Calculation  Start Time: 1019  Stop Time: 1043  Time Calculation (min): 24 min         Assessment/Plan   PT Assessment  PT Assessment Results: Decreased endurance, Impaired balance, Decreased mobility  Rehab Prognosis: Good  End of Session Communication: Bedside nurse  Assessment Comment: Pt continues to require increased assist with all activity. Pt c/o feeling weak and SOB. Pt would benefit from continued PT to further progress strength, endurance and functional mobility. Daughter inquiring about SNF options, TCC made aware.  End of Session Patient Position: Bed, 4 rail up, Alarm off, not on at start of session (Seizure pads on bed)  PT Plan  Inpatient/Swing Bed or Outpatient: Inpatient  PT Plan  Treatment/Interventions: Bed mobility, Transfer training  PT Plan: Ongoing PT  PT Frequency: 3 times per week  PT Discharge Recommendations: Low intensity level of continued care  PT Recommended Transfer Status: Assist x1, Assistive device  General Visit Information:   PT  Visit  PT Received On: 02/03/25  General  Reason for Referral: Impaired mobility  Referred By: Dr. Mora (PT/OT 1/27)  Past Medical History Relevant to Rehab: includes: AAA repair, PE, HLD, CAD  Family/Caregiver Present: Yes  Caregiver Feedback: Daughter present and supportive.  Co-Treatment: OT  Co-Treatment Reason: To maximize functional outcomes and patient safety.  Prior to Session Communication: Bedside nurse  Patient Position Received: Bed, 4 rail up, Alarm off, not on at start of session  General Comment: Pt cleared for PT by nursing and pt needing encouragement. Nursing request pt return to bed due to leaving unit for testing this AM. (external cath, oxygen, tele, seizure pads.)    Subjective   Precautions:  Precautions  Medical Precautions: Fall precautions, Seizure precautions  (Seizure pads now on bed.)  Precautions Comment: Per EMR: High fall risk     Date/Time Vitals Session Patient Position Pulse Resp SpO2 BP MAP (mmHg)    02/03/25 1200 --  --  60  14  96 %  --  --                 Objective   Pain:  Pain Assessment  Pain Assessment: 0-10  0-10 (Numeric) Pain Score: 0 - No pain (Pt denies pain but complains of SOB and gets anxious. Pt on 4L oxygen and vitals WNL, nursing made aware.)  Cognition:  Cognition  Orientation Level: Oriented X4  Coordination:     Postural Control:     Extremity/Trunk Assessments:        Activity Tolerance:  Activity Tolerance  Endurance: Decreased tolerance for upright activites  Treatments:  Bed Mobility  Bed Mobility: Yes (Sup>sit with mod A with HOB elevated and hand over hand assist to reach for bed rail. Sit>sup with mod A to bring BLE back into bed.)    Ambulation/Gait Training  Ambulation/Gait Training Performed: Yes (Pt side stepping to HOB with WW and min A x 2. Pt getting anxious about feeling SOB and wanting to sit. Standing ~90sec.)  Transfers  Transfer: Yes (Sit<>stand with WW and min A x 2. Pt needing tactile cues for proper hand placement on bed prior to stand.)    Outcome Measures:  [facility] Basic Mobility  Turning from your back to your side while in a flat bed without using bedrails: A little  Moving from lying on your back to sitting on the side of a flat bed without using bedrails: A lot  Moving to and from bed to chair (including a wheelchair): A little  Standing up from a chair using your arms (e.g. wheelchair or bedside chair): A little  To walk in hospital room: A little  Climbing 3-5 steps with railing: Total  Basic Mobility - Total Score: 15    Education Documentation  Mobility Training, taught by Jocelin Rivera PTA at 2/3/2025 12:27 PM.  Learner: Patient  Readiness: Acceptance  Method: Explanation  Response: Needs Reinforcement    Education Comments  No comments found.        EDUCATION:  Outpatient Education  Individual(s) Educated:  Patient  Education Provided: Body Mechanics, Fall Risk, Home Safety, POC, Posture    Encounter Problems       Encounter Problems (Active)       PT Problem       Pt. will transfer supine/sit with MOD I (Progressing)       Start:  01/28/25    Expected End:  02/11/25            Pt. will transfer sit/stand with safest and least restrictive assistive device with MOD I  (Progressing)       Start:  01/28/25    Expected End:  02/11/25            Pt.will ambulate 60'' with safest and least restrictive assistive device with MOD I  (Progressing)       Start:  01/28/25    Expected End:  02/11/25            Pt. will amb up/down 2 steps with HR and cane with CGA  (Not Progressing)       Start:  01/28/25    Expected End:  02/11/25            Pt. will perform 2 x 15 B LE AROM exercises  (Progressing)       Start:  01/28/25    Expected End:  02/11/25

## 2025-02-03 NOTE — PROGRESS NOTES
Nephrology Progress Note    Assessment:  89 y.o. yo male admitted with sob.  Found to have PNA and new CHF with ef 35% and pulm HTN.  Na 136 on admission but down to 118 when initial consult done.  Looks hypervolemic.  Sodium level was dropping even before he got lasix so cannot blame entirely on lasix.  On SSRI.  Urine sg 1.019 on admission.  NO hydronephrosis noted on renal ultrasound, has nml sized kidneys. Poor response to diuretics, tolvaptan and salt tabs. Only responded to 3% NS      Plan:  Hold on diuretics for now, responding very poorly to this, will give gentle NS rate and monitor response (Note that Khalida 24 and Uosm in the 400s and Cl trending down s/o element of volume depletion however pt appears hypervolemic and endorses orthopnea (CXR doesn't have significant edema) making picture unclear, BNP elevated as well)  - hold on 3% for now pending assessment w/ NS, goal to avoid decreased salt load (also poor response to salt tabs)  - agree w/ stopping diovan and pt getting K cocktail   Outpatient follow up from renal standpoint: TBD       Subjective:  Admit Date: 1/27/2025    Interval History: got lasix yesterday and salt tabs w/ no improvement in Na, improved w/ 3% up to 117 however got lasix for pulmonary edema and Na down to 113, K up to 5.8 as well     Medications:  Scheduled Meds:apixaban, 5 mg, oral, BID  atorvastatin, 40 mg, oral, Nightly  insulin regular, 10 Units, intravenous, Once   Followed by  dextrose, 25 g, intravenous, Once  guaiFENesin, 600 mg, oral, BID  insulin lispro, 0-5 Units, subcutaneous, TID AC  ipratropium-albuteroL, 3 mL, nebulization, TID  isosorbide mononitrate ER, 60 mg, oral, Daily  [START ON 2/4/2025] metoprolol succinate XL, 25 mg, oral, Daily  metoprolol tartrate, 25 mg, oral, Once  oxygen, , inhalation, Continuous - Inhalation  piperacillin-tazobactam, 3.375 g, intravenous, Once  piperacillin-tazobactam, 3.375 g, intravenous, q8h  polyethylene glycol, 17 g, oral,  "Daily  sennosides, 2 tablet, oral, Nightly  [Held by provider] sodium chloride, 2,000 mg, oral, BID  sodium zirconium cyclosilicate, 10 g, oral, q8h  tamsulosin, 0.8 mg, oral, Daily  traZODone, 50 mg, oral, Nightly  [Held by provider] valsartan, 40 mg, oral, Daily  [START ON 2/4/2025] vancomycin, 1,250 mg, intravenous, q24h  vancomycin, 1,500 mg, intravenous, Once      Continuous Infusions:dextrose 10 % in water (D10W), 50 mL/hr        CBC:   Lab Results   Component Value Date    WBC 17.2 (H) 02/03/2025    RBC 3.28 (L) 02/03/2025    HGB 8.3 (L) 02/03/2025    HCT 25.1 (L) 02/03/2025    MCV 77 (L) 02/03/2025    MCH 25.3 (L) 02/03/2025    MCHC 33.1 02/03/2025    RDW 15.2 (H) 02/03/2025     (H) 02/03/2025     BMP:    Lab Results   Component Value Date     (LL) 02/03/2025    K 5.8 (H) 02/03/2025    CL 83 (L) 02/03/2025    CO2 17 (L) 02/03/2025    BUN 38 (H) 02/03/2025    CREATININE 1.26 02/03/2025    CALCIUM 7.1 (L) 02/03/2025    GLUCOSE 234 (H) 02/03/2025       Objective:  Vitals: BP (!) 175/105   Pulse 88   Temp 36.8 °C (98.2 °F)   Resp 21   Ht 1.88 m (6' 2\")   Wt 105 kg (231 lb 4.2 oz)   SpO2 97%   BMI 29.69 kg/m²    Wt Readings from Last 3 Encounters:   02/03/25 105 kg (231 lb 4.2 oz)   11/12/24 97.5 kg (215 lb)   08/13/24 97.6 kg (215 lb 3.2 oz)      24HR INTAKE/OUTPUT:    Intake/Output Summary (Last 24 hours) at 2/3/2025 1131  Last data filed at 2/3/2025 1000  Gross per 24 hour   Intake 1462 ml   Output 2500 ml   Net -1038 ml       General: alert, in no apparent distress  HEENT: normocephalic, atraumatic, anicteric  Lungs: non-labored respirations, rales   Heart: regular rate and rhythm  Abdomen: soft, non-tender, non-distended  Ext: +BLE peripheral edema  Neuro: alert, follows commands      Electronically signed by Nuria Meyers MD              "

## 2025-02-03 NOTE — PROGRESS NOTES
Inpatient Speech-Language Pathology Clinical Swallow Evaluation       Patient Name: Chris Russell  MRN: [id number]  : 1930  Today's Date: 25  Time Calculation  Start Time: 845  Stop Time: 905  Time Calculation (min): 20 min     Room:     Assessment:     Consistencies Trialed: Consistencies Trialed: Thin (IDDSI Level 0) - Straw, Thin (IDDSI Level 0) - Cup, Pureed/extremely thick (IDDSI Level 4), Regular (IDDSI Level 7)   Oral Motor: Within Functional Limits  Lingual Strength: Within Functional Limits   Dentition: Adequate/Natural, Some Missing Teeth         Assessment Results:   Patient was seen during his breakfast today.    DYSPHAGIA EVALUATION: (Consistencies attempted: Puree, banana trial, scrambled egg trial,thin liquids via cup and straws)    Oral Status: Patient had adequate dentition for mastication.  He had several missing upper and lower teeth.  No asymmetry was noted.  Patient had adequate lingual range of motion and strength.    Oral Phase: Patient's oral phase of the swallow characterized by within functional limits ability to tolerate purée consistency foods without oral delay or oral holding.  He had a mild-moderate oral delay with both banana trial and scrambled eggs with mild oral residual post swallow.  Patient able to clear oral residual with sips of thin liquids.  No leakage of food or liquid seen out of oral cavity.    Pharyngeal Phase: Patient's pharyngeal phase of the swallow characterized by no overt coughing during clinical swallow evaluation.  However, patient had significant wet breath quality prior to p.o. trials with SLP.      DIAGNOSTIC IMPRESSIONS:  Patient presents with a minimal to mild oral phase dysphagia secondary to prolonged mastication and mild oral residual post swallow with solid foods.  No overt coughing seen with food or thin liquid trials via cup or straw during clinical swallow evaluation.  However, patient had significant wet breath quality  before during and after p.o. trials.  Chest x-ray also revealed left lower lobe consolidation.  Recommended to intensivist team that a modified barium swallow study be completed to further assess the physiology of the swallow and rule out possibility of silent aspiration.  Intensivist team in agreement with this plan.  Modified barium swallow study was ordered and will be completed later today.    Medical Staff Made Aware: Yes      Plan:     SLP Plan: Skilled SLP Skilled speech therapy for dysphagia treatment is warranted in order to provide training and instruction regarding the use of compensatory swallow strategies, oropharyngeal strengthening exercises, and pt/caregiver education in order to reduce risk of aspiration, dehydration and malnutrition.  SLP Frequency: 3x per week   Duration: 30 days   Next Treatment Priority: MBS   Discussed POC: Patient, Nursing, Caregiver/family, Physician   Discussed Risks/Benefits: Yes   Patient/Caregiver Agreeable: Yes   SLP Discharge Recommendations: unable to determine at this time, refer to subsequent notes     Subjective:      Patient was seen at bedside during his breakfast today. He was sitting upright feeding himself his breakfast.  Patient's daughter came in later in a.m. and speech therapist spoke with her regarding recommendation for modified barium swallow study test to be completed later today.  She was in agreement with this plan.     General Visit Information:     Pt. Admitted  89 y.o. year old male patient who presented with complaints of difficulty breathing and confusion.  Patient informed staff that he was not feeling well over period of several days prior to presentation.  He does have cough with chest congestion.  Patient's daughter noted that the patient appeared confused to her which is what prompted evaluation in the emergency department.  Imaging suggesting pneumonia.   Past medical history: COPD, CAD AD, CHF, HTN, abdominal aortic aneurysm, GERD,  hyperlipidemia, BPH, pulmonary embolism, current smoker, and obesity.              Reason for Referral: Dysphagia/pneumonia      Current Diet : Regular with thin liquids   Prior to Session Communication: Bedside nurse      Pain:     Pain Assessment  Pain Assessment: 0-10  0-10 (Numeric) Pain Score: 0 - No pain      Baseline Assessment:     Respiratory Status: Oxygen via nasal cannula   Patient Positioning: Upright in Bed      Goals:     Short term goals established 02/03/25:     - Patient will tolerate current diet without noted pulmonary compromise or evidence of pulmonary sequela as noted in patient chart and/or reported by patient/family.  - Pt will independently implement safe swallow strategies to aide in oral clearance as measured by SLP assessment in 90% of therapeutic trials with SLP.  - Pt will demonstrate adequate oral motor skills and cognitive function to participate in a Modified Barium Swallow Study within 1 week of SLP recommendation to fully assess swallow function and determine further treatment needs.     Long term goals 02/03/25:   Patient will tolerate the least restrictive diet without overt difficulty or further pulmonary compromise by time of discharge.      In Patient Education:   Pt. educated on safe swallow strategies, role of SLP, S/S of aspiration to be aware of, risks of aspiration, current swallow function, recommended diet, recommendation of Modified Barium swallow study and procedure  Patient gave verbal understanding  Patient's daughter also gave verbal understanding of recommendation for modified barium swallow study to be completed.    *This documentation was completed using the Dragon Dictation system. There may be spelling and/or grammatical errors that were not corrected prior to final submission.

## 2025-02-03 NOTE — PROGRESS NOTES
Occupational Therapy    OT Treatment    Patient Name: Chris Russell  MRN: [id number]  Department: [facility]  Room: 09/09-A  Today's Date: 2/3/2025  Time Calculation  Start Time: 1018  Stop Time: 1042  Time Calculation (min): 24 min        Assessment:  End of Session Communication: Bedside nurse, PCT/NA/CTA  End of Session Patient Position: Alarm off, not on at start of session (four rails up d/t seizure precautions.  Bed in chair position, all needs met.  Call light within reach.)     Plan:  Treatment Interventions: ADL retraining, Functional transfer training, Endurance training  OT Frequency: 2 times per week  OT Discharge Recommendations: Low intensity level of continued care  OT - OK to Discharge: Yes (Once medically appropriate.)  Treatment Interventions: ADL retraining, Functional transfer training, Endurance training    Subjective   Previous Visit Info:  OT Last Visit  OT Received On: 02/03/25  General:  General  Past Medical History Relevant to Rehab: includes: AAA repair, PE, HLD, CAD  Family/Caregiver Present: Yes  Caregiver Feedback: Daughter present, supportive of care  Co-Treatment: PT  Co-Treatment Reason: To maximize functional outcomes and patient safety.  Prior to Session Communication: Bedside nurse (cleared for participation)  Patient Position Received: Bed, 4 rail up, Alarm off, not on at start of session (external cath, oxygen, tele, seizure pads.)  General Comment: Pt cleared for PT by nursing and pt needing encouragement. Nursing request pt return to bed due to leaving unit for testing this AM.  Precautions:  Hearing/Visual Limitations: [place], B/L hearing aides.  Medical Precautions: Fall precautions, Seizure precautions      Vital Signs Comment: Vitals obtained, c/o dizziness throughout tx.  Supine position: /71, HR 97, 97% on 4L; sitting position: /66, HR , SpO2 94-97% on 4L     Pain:  Pain Assessment  Pain Assessment: 0-10  0-10 (Numeric) Pain Score: 0 - No pain    Objective     Cognition:  Cognition  Orientation Level: Disoriented to situation  Attention: Exceptions to WFL  Problem Solving: Exceptions to WFL  Safety/Judgement: Exceptions to WFL  Planning: Reduced planning skills  Processing Speed: Delayed      Functional Standing Tolerance:  Functional Standing Tolerance Comments: Stand tolerance x 1 1/2 min with b/l UE support.  Standing trial performed to increase tolerance for adl completion  Bed Mobility/Transfers: Bed Mobility 1  Bed Mobility Comments 1: Sup>sit with mod A with HOB elevated and hand over hand assist to reach for bed rail. Sit>sup with mod A to bring BLE back into bed.    Transfer 1  Technique 1: Sit to stand, Stand to sit  Transfer Device 1:  (fww)  Transfer Level of Assistance 1: Minimum assistance, +2 (from elevated bed height)  Trials/Comments 1: verbal cues  Transfers 2  Trials/Comments 2: side step towards HOB with min assist x 2.  Assist to manuever ww, vc's for sequencing       Standing Balance:  Static Standing Balance  Static Standing-Comment/Number of Minutes: fair-  Dynamic Standing Balance  Dynamic Standing-Comments: fair-      Outcome Measures:[facility] Daily Activity  Putting on and taking off regular lower body clothing: A lot  Bathing (including washing, rinsing, drying): A lot  Putting on and taking off regular upper body clothing: A little  Toileting, which includes using toilet, bedpan or urinal: A lot  Taking care of personal grooming such as brushing teeth: A little  Eating Meals: A little  Daily Activity - Total Score: 15        Education Documentation  Body Mechanics, taught by Jennifer L Felty, OTA at 2/3/2025  2:24 PM.  Learner: Patient  Readiness: Acceptance  Method: Explanation, Demonstration  Response: Needs Reinforcement    EDUCATION: Fall prevention; balance and safety improvement strategies; safe transfer techniques; safe ww management.        Goals:  Encounter Problems       Encounter Problems (Active)       OT Goals       Patient will  complete household distance functional mobility at a mod I level.  (Progressing)       Start:  01/28/25    Expected End:  02/11/25            Patient will complete functional transfers at a mod I level.  (Progressing)       Start:  01/28/25    Expected End:  02/11/25            Patient will demonstrate fair + dynamic standing balance during functional tasks. (Progressing)       Start:  01/28/25    Expected End:  02/11/25            Patient will complete toileting at a mod I level.  (Progressing)       Start:  01/28/25    Expected End:  02/11/25            Patient will tolerate standing greater than 3 minutes during functional tasks.  (Progressing)       Start:  01/28/25    Expected End:  02/11/25

## 2025-02-03 NOTE — PROGRESS NOTES
[facility] Critical Care Medicine       Date:  2/3/2025  Patient:  Chris Russell  YOB: 1930  MRN:  [id number]   Admit Date:  1/27/2025  ========================================================================================================    Chief Complaint   Patient presents with    Altered Mental Status     Pt was at home and daughter states about 3-4 hours ago he became disoriented and not making sense with his words. Daughter states he was having a hard time getting words out. Pt daughter states this episode lasted around 5 minutes and he went back to normal. Pt daughter also states that he has had a cough for 3 days which is affecting his breathing.          History of Present Illness:  Chris Russell is a 89 y.o. year old male patient with Past Medical History of PE on Eliquis, DM, HLD, BPH who presented with complaints of difficulty breathing and confusion.  Patient informed staff in the emergency department that he was not feeling well over period of several days prior to presentation.  He does have cough with chest congestion.  Patient's daughter noted that the patient appeared confused to her which is what prompted evaluation in the emergency department.  Imaging suggesting pneumonia.  Patient was started on empiric antibiotic coverage with ceftriaxone/azithromycin.  From a respiratory standpoint his condition was making slow steady progress.  However, the patient began having a precipitous drop in his sodium level.  Initial value was 136 and the most recent value this morning at 112.  Patient has received tolvaptan without significant response.  I was notified this morning that they would like to initiate hypertonic saline and will need transfer to ICU.     When I came to assess the patient he was looking comfortable on nasal cannula.  He did had a fairly extensive edema on his lower extremities.  His appetite is good and he had just finished his breakfast when I entered the room.   Currently afebrile and hemodynamically stable.  Review of the patient's orders show that he did not have a volume restriction but did have a salt restriction.         Interval ICU Events:  2/2 -transfer to ICU for hypertonic saline.  Otherwise hemodynamically stable and saturating well on nasal cannula.     2/3: Started on hypertonic saline overnight for 2 hours.  Repeat sodium level is 117.  Had some respiratory distress overnight with chest x-ray showing continued pneumonia.  Given 40 of Lasix with 800 of output in 3 hours.  White blood cell count is up to 18 today.  Received 3 days of Rocephin and 2 days of Zithromax.  Down to 4 L nasal cannula this morning.  Still states he is short of breath with congested and wet lung sounds.  Also had some confusion overnight.  Became combative and was placed in restraints.    Medical History:  Past Medical History:   Diagnosis Date    Coronary artery disease      Past Surgical History:   Procedure Laterality Date    ABDOMINAL AORTIC ANEURYSM REPAIR      CHOLECYSTECTOMY       Medications Prior to Admission   Medication Sig Dispense Refill Last Dose/Taking    albuterol (Ventolin HFA) 90 mcg/actuation inhaler Inhale 2 puffs every 4 hours if needed for wheezing or shortness of breath. 18 g 2 1/27/2025    atorvastatin (Lipitor) 40 mg tablet TAKE 1 TABLET BY MOUTH EVERY DAY 90 tablet 3 1/26/2025    Eliquis 5 mg tablet Take 1 tablet (5 mg) by mouth 2 times a day. 60 tablet 3 1/27/2025    multivitamin capsule Take 1 capsule by mouth once daily.   1/27/2025    tamsulosin (Flomax) 0.4 mg 24 hr capsule TAKE 2 CAPSULES BY MOUTH ONCE DAILY 180 capsule 1 1/26/2025    apixaban (Eliquis DVT-PE Treat 30D Start) 5 mg (74 tabs) tablets,dose pack Take 2 tablets (10 mg) by mouth every 12 hours.       benzocaine-menthoL (Cepacol Sore Throat, lyndsey-men,) 15-2.3 mg lozenge Place into mouth between cheek and gum.   Unknown    traZODone (Desyrel) 50 mg tablet TAKE 1 TABLET (50 MG) BY MOUTH ONCE DAILY  AT BEDTIME. 90 tablet 0      Patient has no known allergies.  Social History     Tobacco Use    Smoking status: Never     Passive exposure: Never    Smokeless tobacco: Never   Substance Use Topics    Alcohol use: Never    Drug use: Never     Family History   Problem Relation Name Age of Onset    No Known Problems Mother      No Known Problems Father         Review of Systems:  14 point review of systems was completed and negative except for those specially mention in my HPI    Physical Exam:    Heart Rate:  [0-128]   Temp:  [36.2 °C (97.2 °F)-36.8 °C (98.2 °F)]   Resp:  [15-43]   BP: ()/()   Weight:  [105 kg (231 lb 4.2 oz)]   SpO2:  [87 %-100 %]     Physical Exam  Vitals reviewed.   Constitutional:       Appearance: Normal appearance.   HENT:      Head: Normocephalic.      Nose: Nose normal.      Mouth/Throat:      Mouth: Mucous membranes are moist.      Pharynx: Oropharynx is clear.   Eyes:      Extraocular Movements: Extraocular movements intact.      Pupils: Pupils are equal, round, and reactive to light.   Cardiovascular:      Pulses: Normal pulses.      Heart sounds: Normal heart sounds.      Comments: Sinus tachycardia  Pulmonary:      Comments: Coarse crackles with rhonchi throughout, congested and productive cough, no wheezing, mild tachypnea  Abdominal:      General: Abdomen is flat.      Palpations: Abdomen is soft.   Musculoskeletal:         General: Normal range of motion.      Cervical back: Normal range of motion and neck supple.   Skin:     General: Skin is warm and dry.      Capillary Refill: Capillary refill takes less than 2 seconds.   Neurological:      General: No focal deficit present.      Mental Status: He is alert.      Comments: Disoriented to place and time, oriented to self, awakens easily and answers questions appropriately         Objective:    I have reviewed all medications, laboratory results, and imaging pertinent for today's encounter    Assessment/Plan:    I am  currently managing this critically ill patient for the following problems:    Neuro/Psych/Pain Ctrl/Sedation:  #Delirium versus hypoxic encephalopathy  -- Now back to baseline mental status  -- Delirium precautions  -- Maintain sleep-wake cycle  -- Out of restraints    Respiratory/ENT:  #Acute hypoxic respiratory failure  #Pneumonia  #Pulmonary edema  #Pulmonary hypertension  #Every day smoker, likely undiagnosed COPD  --Continue titrate oxygen to maintain saturation greater than 92%  -- Spot dosing of Lasix for pulmonary edema, watch sodium level  -- Sputum sample  -- Zosyn, vancomycin for continued pneumonia, prolonged QTc and avoiding cefepime in the patient with altered mental status  -- Inhaled bronchodilators  -- Hold off on systemic steroids  -- Speech eval for possible dysphagia    Cardiovascular:  #Acute on chronic systolic heart failure, last known EF 35%  #Hypertension  #Sinus tachycardia  -- On metoprolol 25 XL, initially was going to increase to 50 XL daily however patient became normotensive with heart rates in the 80s, continue 25 XL daily  -- Received Lasix this morning, chest x-ray with what looks like pneumonia, sodium went from 117-113  -- Hold off on further diuresis    GI:  #Possible dysphagia  -- Speech eval saw, recommending MBS  -- Okay to continue eating and drinking  -- Sodium restriction stopped    Renal/Volume Status (Intra & Extravascular):  #Hyponatremia   #Hyperkalemia  -- Sodium dropped from 1 36-1 12, 117 overnight, now 113 at 10 AM  -- Nephrology would like gentle hydration with 50 mL/h x 10 hours with continued every 6 hour BMP for sodium level  -- Insulin/dextrose, Lokelma  -- Stop Diovan    Endocrine  #DM 2  -- Was mildly hyperglycemic this morning however received a dose of steroids last night  -- New SSI  -- Continue hypoglycemia protocol    Infectious Disease:  #Pneumonia  -- Start Zosyn and vancomycin  -- MRSA PCR for de-escalation    Heme/Onc:  #History of pulmonary embolism  on Eliquis  -- Continue Eliquis    OBGYN/MSK:  #Generalized deconditioning  -- PT OT    Ethics/Code Status:  #Full code    :  DVT Prophylaxis: Eliquis  GI Prophylaxis: None  Bowel Regimen: As needed  Diet: Regular  CVC: None  [place]: None  Short: External  Restraints: Off  Dispo: ICU    Critical Care Time:  56    Loren Bansal APRN-CNP   Pulmonology and critical care  [facility]

## 2025-02-03 NOTE — PROGRESS NOTES
Speech-Language Pathology    Inpatient Modified Barium Swallow Study    Patient Name: Chris Russell  MRN: [id number]  : 1930  Today's Date: 25  Time Calculation  Start Time: 1100  Stop Time: 1135  Time Calculation (min): 35 min      -A    Modified Barium Swallow Study completed. Informed verbal consent obtained prior to completion of exam. The study was completed per protocol with various liquid barium consistencies, pudding, solids and a 13mm barium tablet. A 1.9 cm or .75 inch (outer diameter) ring was placed on the chin in the lateral view and on the lateral, left side of the neck in the a-p view in order to complete objective measurements during swallowing. The anatomic structures and function of the oropharynx, larynx, hypopharynx and cervical esophagus were evaluated.    SLP: BHARAT Garcia   Contact info: [employer] secure chat; phone: [phone]    Reason for Referral: Chest x-ray revealing pneumonia.  Patient Hx: Pt. Admitted  89 y.o. year old male patient who presented with complaints of difficulty breathing and confusion.  Patient informed staff that he was not feeling well over period of several days prior to presentation.  He does have cough with chest congestion.  Patient's daughter noted that the patient appeared confused to her which is what prompted evaluation in the emergency department.  Imaging suggesting pneumonia.   Past medical history: COPD, CAD AD, CHF, HTN, abdominal aortic aneurysm, GERD, hyperlipidemia, BPH, pulmonary embolism, current smoker, and obesity.    Respiratory Status: Nasal cannula  Current diet: Regular diet with thin liquids    Pain:  Pain Scale: 0-10  Ratin    FINAL SPEECH RECOMMENDATIONS    DIET:   - Regular (IDDSI Level 7)  - Thin liquids (IDDSI Level 0)    STRATEGIES:  - Small bites  - Small, single sips  - Alternate consistencies  - Add moisture to dry foods  - Upright for all PO intake  - Reflux Precautions  -Decreased rate of intake  -[place]  diet strategy    Plan:  Treatment/Interventions: Pharyngeal exercises, Patient/family education, Bolus trials, Compensatory strategy training, Diet tolerance/advancement  SLP Plan: Skilled SLP warranted  SLP Frequency: 3x per week  Duration: 30 days    Discussed POC: Patient  Discussed Risks/Benefits: Yes  Patient/Caregiver Agreeable: Yes    Short term goals established 02/03/25:     - Patient will tolerate current diet without noted pulmonary compromise or evidence of pulmonary sequela as noted in patient chart and/or reported by patient/family.  - Patient will independently implement safe swallowing strategies to reduce risk of aspiration with use of compensatory strategies during 90% of therapeutic trials.  - Pt will independently implement safe swallow strategies to improve esophageal clearance as measured by pt report in 90% of therapeutic trials with SLP.     Long term goals 02/03/25:   Patient will tolerate the least restrictive diet without overt difficulty or further pulmonary compromise by time of discharge.    Education Provided: Results and recommendations per MBSS, with video review; recommendations and POC at this time. Verbal understanding and agreement given on all accounts.     Mechanics of the Swallow Summary:  ORAL PHASE:  Lip Closure - Escape progressing to mid-chin   (thin liquids rapid cup sip)  Tongue Control During Bolus Hold - Posterior escape of less than half of the bolus   Bolus prep/mastication - Slow prolonged mastication with complete re-collection necessary   Bolus transport/lingual motion - Brisk tongue motion for A-P movement of the bolus   Oral residue - Residue collection on oral structure     PHARYNGEAL PHASE:  Initiation of pharyngeal swallow - Bolus head at vallecular pit   Soft palate elevation - No bolus between soft palate/pharyngeal wall   Laryngeal elevation - Complete superior movement of thyroid cartilage with contact of arytenoids to epiglottic petiole   Anterior hyoid  excursion - Complete anterior movement   Epiglottic movement - Complete inversion    Laryngeal vestibule closure - Complete - no air/contrast in laryngeal vestibule   Pharyngeal stripping wave - Complete  Pharyngeal contraction (A/P view) - Complete  Pharyngoesophageal segment opening - Complete distension and complete duration/no obstruction of flow of bolus   Tongue base retraction - Trace column of contrast or air between tongue base and pharyngeal wall   Pharyngeal residue - Collection of residue within or on the pharyngeal structures     ESOPHAGEAL PHASE:  Esophageal clearance -  20 mL thin liquid trial Esophageal retention with retrograde flow below the pharyngoesophageal segment   20 mL nectar thick liquid trial Not evaluated   Puree consistency trial Esophageal retention with retrograde flow below the pharyngoesophageal segment   Barium tablet trial Not evaluated     SLP Impressions with Severity Rating:   Pt presents with mild oropharyngeal dysphagia and mild to moderate esophageal phase dysphagia upon completion of modified barium swallow study this date. Swallowing physiology is detailed above. Impairments most impacting swallowing safety and efficiency include posterior escape of bolus, slow prolonged mastication, residual collection on oral and pharyngeal structures, and retention with retrograde flow of barium contrast in the esophagus.. Patient demonstrated no evidence of penetration or aspiration during the study. Patient demonstrated minimal to mild oral and pharyngeal residue that was reduced with second swallow.  Recommending a regular diet with thin liquids and use of slick diet strategy when needed.    Strategies attempted-   Effortful swallow   Double swallow  Purdy diet strategy    OUTCOME MEASURES:  Functional Oral Intake Scale  Functional Oral Intake Scale: Level 7        total oral diet with no restrictions     Rosenbek's Penetration Aspiration Scale  Thin Liquids: 1. NO ASPIRATION & NO  PENETRATION - no aspiration, contrast does not enter airway  Puree: 1. NO ASPIRATION & NO PENETRATION - no aspiration, contrast does not enter airway  Soft Solids: 1. NO ASPIRATION & NO PENETRATION - no aspiration, contrast does not enter airway  Solids: 1. NO ASPIRATION & NO PENETRATION - no aspiration, contrast does not enter airway    This portion of the study signed by BHARAT Garcia on 02/03/25.      *This documentation was completed using the Dragon Dictation system. There may be spelling and/or grammatical errors that were not corrected prior to final submission.

## 2025-02-03 NOTE — CONSULTS
"Consults    Reason For Consult  Goals of care     History Of Present Illness  Chris Russell is a 89 y.o. year old male patient with Past Medical History of PE on Eliquis, DM, HLD, BPH who presented with complaints of difficulty breathing and confusion.  Patient informed staff in the emergency department that he was not feeling well over period of several days prior to presentation.  Being treated for CAP However, developed hyponatremia without improvement and for unknown reason.  Nephrology following.   Given pt's known heart failure and frail state palliative care is consulted for goals of care given advanced age and FULL CODE status.      ROS: difficult to obtain with good reliability d/t [place].  Pt denies pain.  Reports SOB at times.  Denies CP currently    Personal/Social History  He reports that he has never smoked. He has never been exposed to tobacco smoke. He has never used smokeless tobacco. He reports that he does not drink alcohol and does not use drugs.    Past Medical History  He has a past medical history of Coronary artery disease.    Surgical History  He has a past surgical history that includes Cholecystectomy and Abdominal aortic aneurysm repair.     Family History  Family History   Problem Relation Name Age of Onset    No Known Problems Mother      No Known Problems Father       Allergies  Patient has no known allergies.    Review of Systems     Physical Exam  GEN: awake, elderly, ill appearing gentleman. +conversational dyspnea  HEENT: MMM; PERRL  CV: RRR  PULM: wet breath sounds noted; even and regular  ABD: soft, NT/ND  MSK: moves all ext  SKIN: no rashes  NEURO: follow commands, alert and oriented x2-3 and somewhat to situation    Last Recorded Vitals  Blood pressure 109/53, pulse 60, temperature 36.6 °C (97.8 °F), temperature source Temporal, resp. rate 16, height 1.88 m (6' 2\"), weight 105 kg (231 lb 4.2 oz), SpO2 97%.    Relevant Results  Scheduled medications  apixaban, 5 mg, oral, " BID  atorvastatin, 40 mg, oral, Nightly  guaiFENesin, 600 mg, oral, BID  insulin lispro, 0-5 Units, subcutaneous, TID AC  insulin lispro, 3 Units, subcutaneous, Once  ipratropium-albuteroL, 3 mL, nebulization, TID  isosorbide mononitrate ER, 60 mg, oral, Daily  [START ON 2/4/2025] metoprolol succinate XL, 25 mg, oral, Daily  metoprolol tartrate, 25 mg, oral, Once  oxygen, , inhalation, Continuous - Inhalation  piperacillin-tazobactam, 3.375 g, intravenous, q8h  polyethylene glycol, 17 g, oral, Daily  sennosides, 2 tablet, oral, Nightly  sodium zirconium cyclosilicate, 10 g, oral, q8h  tamsulosin, 0.8 mg, oral, Daily  traZODone, 50 mg, oral, Nightly  [Held by provider] valsartan, 40 mg, oral, Daily  [START ON 2/4/2025] vancomycin, 1,250 mg, intravenous, q24h      Continuous medications  dextrose 10 % in water (D10W), 50 mL/hr  sodium chloride 0.9%, 50 mL/hr, Last Rate: 50 mL/hr (02/03/25 1159)      PRN medications  PRN medications: acetaminophen **OR** acetaminophen **OR** acetaminophen, albuterol, benzonatate, bisacodyl, calcium carbonate, dextrose, dextrose, glucagon, glucagon, hydrALAZINE, ipratropium-albuteroL, labetaloL, magnesium hydroxide, melatonin, nitroglycerin, ondansetron, traMADol, traZODone, vancomycin     Results for orders placed or performed during the hospital encounter of 01/27/25 (from the past 24 hours)   Basic metabolic panel   Result Value Ref Range    Glucose 165 (H) 74 - 99 mg/dL    Sodium 112 (LL) 136 - 145 mmol/L    Potassium 5.4 (H) 3.5 - 5.3 mmol/L    Chloride 79 (L) 98 - 107 mmol/L    Bicarbonate 23 21 - 32 mmol/L    Anion Gap 15 10 - 20 mmol/L    Urea Nitrogen 33 (H) 6 - 23 mg/dL    Creatinine 1.31 (H) 0.50 - 1.30 mg/dL    eGFR 50 (L) >60 mL/min/1.73m*2    Calcium 7.4 (L) 8.6 - 10.3 mg/dL   ECG 12 Lead   Result Value Ref Range    Ventricular Rate 65 BPM    Atrial Rate 65 BPM    FL Interval 290 ms    QRS Duration 86 ms    QT Interval 374 ms    QTC Calculation(Bazett) 388 ms    P Axis 66  degrees    R Axis -61 degrees    T Axis 247 degrees    QRS Count 11 beats    Q Onset 213 ms    P Onset 68 ms    P Offset 108 ms    T Offset 400 ms    QTC Fredericia 383 ms   POCT GLUCOSE   Result Value Ref Range    POCT Glucose 124 (H) 74 - 99 mg/dL   Basic metabolic panel   Result Value Ref Range    Glucose 159 (H) 74 - 99 mg/dL    Sodium 112 (LL) 136 - 145 mmol/L    Potassium 5.3 3.5 - 5.3 mmol/L    Chloride 81 (L) 98 - 107 mmol/L    Bicarbonate 22 21 - 32 mmol/L    Anion Gap 14 10 - 20 mmol/L    Urea Nitrogen 35 (H) 6 - 23 mg/dL    Creatinine 1.36 (H) 0.50 - 1.30 mg/dL    eGFR 48 (L) >60 mL/min/1.73m*2    Calcium 7.1 (L) 8.6 - 10.3 mg/dL   Magnesium   Result Value Ref Range    Magnesium 2.22 1.60 - 2.40 mg/dL   Basic Metabolic Panel   Result Value Ref Range    Glucose 143 (H) 74 - 99 mg/dL    Sodium 117 (LL) 136 - 145 mmol/L    Potassium 5.2 3.5 - 5.3 mmol/L    Chloride 83 (L) 98 - 107 mmol/L    Bicarbonate 26 21 - 32 mmol/L    Anion Gap 13 10 - 20 mmol/L    Urea Nitrogen 33 (H) 6 - 23 mg/dL    Creatinine 1.29 0.50 - 1.30 mg/dL    eGFR 51 (L) >60 mL/min/1.73m*2    Calcium 7.5 (L) 8.6 - 10.3 mg/dL   CBC and Auto Differential   Result Value Ref Range    WBC 17.2 (H) 4.4 - 11.3 x10*3/uL    nRBC 0.0 0.0 - 0.0 /100 WBCs    RBC 3.28 (L) 4.50 - 5.90 x10*6/uL    Hemoglobin 8.3 (L) 13.5 - 17.5 g/dL    Hematocrit 25.1 (L) 41.0 - 52.0 %    MCV 77 (L) 80 - 100 fL    MCH 25.3 (L) 26.0 - 34.0 pg    MCHC 33.1 32.0 - 36.0 g/dL    RDW 15.2 (H) 11.5 - 14.5 %    Platelets 497 (H) 150 - 450 x10*3/uL    Neutrophils % 63.3 40.0 - 80.0 %    Immature Granulocytes %, Automated 4.2 (H) 0.0 - 0.9 %    Lymphocytes % 23.4 13.0 - 44.0 %    Monocytes % 8.7 2.0 - 10.0 %    Eosinophils % 0.2 0.0 - 6.0 %    Basophils % 0.2 0.0 - 2.0 %    Neutrophils Absolute 10.87 (H) 1.60 - 5.50 x10*3/uL    Immature Granulocytes Absolute, Automated 0.72 (H) 0.00 - 0.50 x10*3/uL    Lymphocytes Absolute 4.02 (H) 0.80 - 3.00 x10*3/uL    Monocytes Absolute 1.49 (H)  0.05 - 0.80 x10*3/uL    Eosinophils Absolute 0.03 0.00 - 0.40 x10*3/uL    Basophils Absolute 0.03 0.00 - 0.10 x10*3/uL   ECG 12 Lead   Result Value Ref Range    Ventricular Rate 128 BPM    Atrial Rate 128 BPM    GA Interval 158 ms    QRS Duration 90 ms    QT Interval 346 ms    QTC Calculation(Bazett) 505 ms    R Axis 14 degrees    T Axis -43 degrees    QRS Count 21 beats    Q Onset 213 ms    P Onset 134 ms    P Offset 188 ms    T Offset 386 ms    QTC Fredericia 445 ms   POCT GLUCOSE   Result Value Ref Range    POCT Glucose 242 (H) 74 - 99 mg/dL   MRSA Surveillance for Vancomycin De-escalation, PCR    Specimen: Anterior Nares; Swab   Result Value Ref Range    MRSA PCR Not Detected Not Detected   Procalcitonin   Result Value Ref Range    Procalcitonin 3.00 (H) <=0.07 ng/mL   Basic metabolic panel   Result Value Ref Range    Glucose 234 (H) 74 - 99 mg/dL    Sodium 113 (LL) 136 - 145 mmol/L    Potassium 5.8 (H) 3.5 - 5.3 mmol/L    Chloride 83 (L) 98 - 107 mmol/L    Bicarbonate 17 (L) 21 - 32 mmol/L    Anion Gap 19 10 - 20 mmol/L    Urea Nitrogen 38 (H) 6 - 23 mg/dL    Creatinine 1.26 0.50 - 1.30 mg/dL    eGFR 53 (L) >60 mL/min/1.73m*2    Calcium 7.1 (L) 8.6 - 10.3 mg/dL   POCT GLUCOSE   Result Value Ref Range    POCT Glucose 235 (H) 74 - 99 mg/dL   Vancomycin   Result Value Ref Range    Vancomycin 17.7 5.0 - 20.0 ug/mL   Basic metabolic panel   Result Value Ref Range    Glucose 255 (H) 74 - 99 mg/dL    Sodium 115 (LL) 136 - 145 mmol/L    Potassium 5.1 3.5 - 5.3 mmol/L    Chloride 81 (L) 98 - 107 mmol/L    Bicarbonate 22 21 - 32 mmol/L    Anion Gap 17 10 - 20 mmol/L    Urea Nitrogen 39 (H) 6 - 23 mg/dL    Creatinine 1.32 (H) 0.50 - 1.30 mg/dL    eGFR 50 (L) >60 mL/min/1.73m*2    Calcium 7.1 (L) 8.6 - 10.3 mg/dL   POCT GLUCOSE   Result Value Ref Range    POCT Glucose 239 (H) 74 - 99 mg/dL          Assessment/Plan   -Briefly met with patient, no family present at time of encounter.  Pt is alert to self, place, and somewhat  to his situation.  He is very hard of hearing but was able to understand resuscitation questions and states if he dies to just leave him alone and would not want intubation, even if temporary.  He did segundo me permission to contact his daughter Ayala.  I spoke with Ayala by phone and she is aware of pt's wishes and thought it was already on file because he did speak with someone about wishes.  I will place an order for DNR+DNI.  More extended goals of care conversation can take place with daughter presence.  I spent 45 minutes in the professional and overall care of this patient.      Flex Meyer, APRN-CNP

## 2025-02-03 NOTE — PROGRESS NOTES
[facility] Heart Progress Note           Rounding JW/Cardiologist:  Griffin Gardner, APRN-CNP, Dr. Venkata Stubbs  Primary Cardiologist: Dr. Venkata Stubbs    Date:  2/3/2025  Patient:  Chris Russell  YOB: 1930  MRN:  [id number]   Admit Date:  1/27/2025      SUBJECTIVE:    2/3/25  Patient is awake and alert his daughter is at his bedside I spoke to them both at length they are well-informed of plan of care.  He is short of breath at rest  EKG is sinus tachycardia    1/31/25  Patient states that last night he did have an episode where he felt chest pain,.  He states that this morning he feels much better I spoke to him at length I did offer him a heart catheterization today he absolutely refuses to have any invasive testing.  I did talk to his daughter Ayala and inform her that he absolutely does not want any further cardiac testing she is aware and she is going to talk to him.  Currently sitting up in chair he states he is feeling much better and answered all of his questions  EKG shows some ST changes inferior depression    CONCLUSIONS:   1. The left ventricular systolic function is moderately decreased, with a visually estimated ejection fraction of 35%.   2. There is global hypokinesis of the left ventricle with minor regional variations.   3. Spectral Doppler shows a Grade II (pseudonormal pattern) of left ventricular diastolic filling with an elevated left atrial pressure.   4. There is normal right ventricular global systolic function.   5. Right ventricle is upper limits of normal in size.   6. There is no evidence of mitral valve stenosis.   7. Mild mitral valve regurgitation.   8. Mild tricuspid regurgitation is visualized.   9. Mild aortic valve stenosis.  10. Pulmonary hypertension is present.  11. Severely elevated pulmonary artery pressure.  12. The inferior vena cava appears mildly dilated.      VITALS:     Vitals:    02/03/25 0700 02/03/25 0800 02/03/25 0900 02/03/25 1000   BP:        Pulse: (!) 122 (!) 122 110 88   Resp: (!) 30 (!) 29 22 21   Temp:       TempSrc:       SpO2: 98% 95% 96% 97%   Weight:       Height:           Intake/Output Summary (Last 24 hours) at 2/3/2025 1026  Last data filed at 2/3/2025 0800  Gross per 24 hour   Intake 1262 ml   Output 2400 ml   Net -1138 ml       Wt Readings from Last 4 Encounters:   02/03/25 105 kg (231 lb 4.2 oz)   11/12/24 97.5 kg (215 lb)   08/13/24 97.6 kg (215 lb 3.2 oz)   05/14/24 96.3 kg (212 lb 6.4 oz)       CURRENT HOSPITAL MEDICATIONS:   apixaban, 5 mg, oral, BID  atorvastatin, 40 mg, oral, Nightly  guaiFENesin, 600 mg, oral, BID  insulin lispro, 0-5 Units, subcutaneous, TID AC  ipratropium-albuteroL, 3 mL, nebulization, TID  isosorbide mononitrate ER, 60 mg, oral, Daily  [START ON 2/4/2025] metoprolol succinate XL, 50 mg, oral, Daily  metoprolol tartrate, 25 mg, oral, Once  oxygen, , inhalation, Continuous - Inhalation  piperacillin-tazobactam, 3.375 g, intravenous, Once  piperacillin-tazobactam, 3.375 g, intravenous, q8h  polyethylene glycol, 17 g, oral, Daily  sennosides, 2 tablet, oral, Nightly  [Held by provider] sodium chloride, 2,000 mg, oral, BID  tamsulosin, 0.8 mg, oral, Daily  traZODone, 50 mg, oral, Nightly  valsartan, 40 mg, oral, Daily  [START ON 2/4/2025] vancomycin, 1,250 mg, intravenous, q24h  vancomycin, 1,500 mg, intravenous, Once         Current Outpatient Medications   Medication Instructions    albuterol (Ventolin HFA) 90 mcg/actuation inhaler 2 puffs, inhalation, Every 4 hours PRN    apixaban (Eliquis DVT-PE Treat 30D Start) 5 mg (74 tabs) tablets,dose pack 2 tablets, Every 12 hours    atorvastatin (LIPITOR) 40 mg, oral, Daily    benzocaine-menthoL (Cepacol Sore Throat, lyndsey-men,) 15-2.3 mg lozenge Place into mouth between cheek and gum.    Eliquis 5 mg, oral, 2 times daily    multivitamin capsule 1 capsule, Daily RT    tamsulosin (FLOMAX) 0.8 mg, oral, Daily    traZODone (DESYREL) 50 mg, oral, Nightly    traZODone  (DESYREL) 100 mg, oral, Nightly PRN        PHYSICAL EXAMINATION:   GENERAL:  Well developed, well nourished, in no acute distress.  Hard of hearing  CHEST:  Symmetric and nontender.  NEURO/PSYCH:  Alert and oriented times three with approppriate behavior and responses.  NECK:  Supple, no JVD, no bruit.  LUNGS:  scattered rales  HEART: 1, S2 irregular  EXTREMITIES:  Warm with good color, no clubbing or cyanosis.  Trace edema  PERIPHERAL VASCULAR:  Pulses present and equally palpable; 1+ throughout.      LAB DATA:     CBC:   Results from last 7 days   Lab Units 02/03/25  0420 02/02/25  0548 02/01/25  0541   WBC AUTO x10*3/uL 17.2* 14.8* 13.8*   RBC AUTO x10*6/uL 3.28* 3.44* 3.39*   HEMOGLOBIN g/dL 8.3* 8.6* 8.7*   HEMATOCRIT % 25.1* 26.0* 26.4*   MCV fL 77* 76* 78*   MCH pg 25.3* 25.0* 25.7*   MCHC g/dL 33.1 33.1 33.0   RDW % 15.2* 15.0* 15.2*   PLATELETS AUTO x10*3/uL 497* 551* 495*     CMP:    Results from last 7 days   Lab Units 02/03/25  0420 02/03/25  0009 02/02/25  1809 01/28/25  0614 01/27/25  1436   SODIUM mmol/L 117* 112* 112*   < > 136   POTASSIUM mmol/L 5.2 5.3 5.4*   < > 4.4   CHLORIDE mmol/L 83* 81* 79*   < > 98   CO2 mmol/L 26 22 23   < > 27   BUN mg/dL 33* 35* 33*   < > 32*   CREATININE mg/dL 1.29 1.36* 1.31*   < > 1.53*   GLUCOSE mg/dL 143* 159* 165*   < > 164*   PROTEIN TOTAL g/dL  --   --   --   --  7.3   CALCIUM mg/dL 7.5* 7.1* 7.4*   < > 9.2   BILIRUBIN TOTAL mg/dL  --   --   --   --  0.4   ALK PHOS U/L  --   --   --   --  139*   AST U/L  --   --   --   --  71*   ALT U/L  --   --   --   --  124*    < > = values in this interval not displayed.     BMP:    Results from last 7 days   Lab Units 02/03/25  0420 02/03/25  0009 02/02/25  1809   SODIUM mmol/L 117* 112* 112*   POTASSIUM mmol/L 5.2 5.3 5.4*   CHLORIDE mmol/L 83* 81* 79*   CO2 mmol/L 26 22 23   BUN mg/dL 33* 35* 33*   CREATININE mg/dL 1.29 1.36* 1.31*   CALCIUM mg/dL 7.5* 7.1* 7.4*   GLUCOSE mg/dL 143* 159* 165*     Magnesium:  Results from  last 7 days   Lab Units 02/03/25  0420 02/02/25  0548 02/01/25  0541   MAGNESIUM mg/dL 2.22 2.08 2.05     Troponin:    Results from last 7 days   Lab Units 01/31/25  0459 01/27/25  1601 01/27/25  1436   TROPHS ng/L 232* 19 23*     BNP:   Results from last 7 days   Lab Units 02/01/25  1614 01/29/25  1253   BNP pg/mL 2,512* 2,465*     Lipid Panel:         DIAGNOSTIC TESTING:   @No results found for this or any previous visit.    ECG 12 Lead    Result Date: 1/27/2025  Sinus rhythm with Blocked Premature atrial complexes with occasional Premature ventricular complexes Low voltage QRS Left anterior fascicular block Possible Anterolateral infarct (cited on or before 20-JUN-2023) Abnormal ECG When compared with ECG of 20-JUN-2023 02:35, Premature ventricular complexes are now Present Premature atrial complexes are now Present Left anterior fascicular block is now Present Questionable change in initial forces of Anterolateral leads    XR chest 1 view    Result Date: 1/27/2025  Interpreted By:  Darrion Crowley, STUDY: XR CHEST 1 VIEW  1/27/2025 2:25 pm   INDICATION: Signs/Symptoms:AMS   COMPARISON: 06/04/2024   ACCESSION NUMBER(S): [id number]   ORDERING CLINICIAN: PAL THORNTON   TECHNIQUE: A single AP portable radiograph of the chest was obtained.   FINDINGS: Multiple cardiac monitoring leads are seen over the chest.  Bibasilar airspace consolidations are seen and may represent small pleural effusions, atelectasis and/or pneumonia. No pneumothorax is identified. The cardiac silhouette is within normal limits for size.       Bibasilar airspace opacities, as above. Clinical correlation and continued follow-up until clearing is recommended.   MACRO: None.   Signed by: Darrion Crowley 1/27/2025 2:37 PM Dictation workstation:   [id number]    CT head wo IV contrast    Result Date: 1/27/2025  Interpreted By:  Darrion Crowley, STUDY: CT HEAD WO IV CONTRAST; 1/27/2025 2:22 pm   INDICATION: Signs/Symptoms:AMS.   COMPARISON: None.    ACCESSION NUMBER(S): [id number]   ORDERING CLINICIAN: PAL THORNTON   TECHNIQUE: Contiguous axial CT images were obtained through the head at 5 mm slice thickness without contrast administration.   FINDINGS: INTRACRANIAL: The ventricles, sulci and basal cisterns are within normal limits for size and configuration. The grey-white differentiation is intact. There is no mass effect or midline shift. There is no extraaxial fluid collection. There is no intracranial hemorrhage.  The calvarium is unremarkable.   EXTRACRANIAL: Visualized paranasal sinuses and mastoids are clear.       No evidence of acute cortical infarct or intracranial hemorrhage.   MACRO: None     Signed by: Darrion Crowley 1/27/2025 2:36 PM Dictation workstation:   [id number]       XR chest 1 view   Final Result   Poor definition of the left hemidiaphragm suggesting left lower lobe   consolidation. Coarse lung markings.        Enlarged cardiac silhouette        MACRO:   none        Signed by: Awa Connell 2/3/2025 8:04 AM   Dictation workstation:   [id number]      XR chest 1 view   Final Result   Unchanged bibasilar atelectasis.        MACRO:   None        Signed by: Omaira Santamaria 2/2/2025 11:32 AM   Dictation workstation:   [id number]      US renal complete   Final Result   No hydronephrosis. Chronic medical renal disease.             MACRO:   None        Signed by: Joseph Schoenberger 1/31/2025 4:11 PM   Dictation workstation:   [id number]      CT chest w IV contrast   Final Result   1.  Bilateral dependent lower lobe atelectasis   2. 2.4 cm pleural-based ground-glass opacity right upper lobe is new.   Consider follow up non contrast chest CT at 6-12 months to confirm   persistence, then consider CT chest every 2 years until 5   years.(Gary Cortez et al., Guidelines for management of incidental   pulmonary nodules detected on CT images: From the Fleischner Society   2017, Radiology. 2017 Jul;284 (1):228-243.) [email or web address]         Signed by: Merline Quinn 1/31/2025 2:20 PM   Dictation workstation:   [id number]      Transthoracic Echo (TTE) Complete   Final Result      XR chest 1 view   Final Result   Bibasilar airspace opacities, as above. Clinical correlation and   continued follow-up until clearing is recommended.        MACRO:   None.        Signed by: Darrion Crowley 1/27/2025 2:37 PM   Dictation workstation:   [id number]      CT head wo IV contrast   Final Result   No evidence of acute cortical infarct or intracranial hemorrhage.        MACRO:   None             Signed by: Darrion Crowley 1/27/2025 2:36 PM   Dictation workstation:   [id number]      FL modified barium swallow study    (Results Pending)       Transthoracic Echo (TTE) Complete    Result Date: 1/30/2025          [place] [address]  Tel [phone] Fax [phone] TRANSTHORACIC ECHOCARDIOGRAM REPORT Patient Name:       MCKENZIE DE LOS SANTOS     Reading Physician:    [id number] Michael Felipe DO Study Date:         1/30/2025           Ordering Provider:    [id number] BANEGAS MRN/PID:            [id number]            Fellow: Accession#:         [id number]        Nurse: Date of Birth/Age:  1/25/1930 / 89      Sonographer:          Sisi MCMULLEN Gender Assigned at  M                   Additional Staff: Birth: Height:             187.96 cm           Admit Date:           1/27/2025 Weight:             99.79 kg            Admission Status:     Inpatient -                                                               Routine BSA / BMI:          2.26 m2 / 28.25     Department Location:  [place] [address]                                     Echo Lab Blood Pressure: 116 /74 mmHg Study Type:    TRANSTHORACIC ECHO (TTE) COMPLETE  Diagnosis/ICD: Shortness of breath-R06.02 Indication:    SOB, PNEUMONIA CPT Codes:     Echo Complete w Full Doppler-[id number] Patient History: Diabetes:          Yes Pertinent History: Hyperlipidemia, COLE and Dyspnea. Study Detail: The following Echo studies were performed: 2D, M-Mode, Doppler and               color flow. Technically challenging study due to prominent lung               artifact. Definity used as a contrast agent for endocardial border               definition. Total contrast used for this procedure was 2 mL via IV               push. The patient was awake.  PHYSICIAN INTERPRETATION: Left Ventricle: The left ventricular systolic function is moderately decreased, with a visually estimated ejection fraction of 35%. There is global hypokinesis of the left ventricle with minor regional variations. The left ventricular cavity size is normal. There is mild increased septal and mildly increased posterior left ventricular wall thickness. Spectral Doppler shows a Grade II (pseudonormal pattern) of left ventricular diastolic filling with an elevated left atrial pressure. Left Atrium: The left atrial size is mildly dilated. Right Ventricle: The right ventricle is upper limits of normal in size. There is normal right ventricular global systolic function. Right Atrium: The right atrial size is mildly dilated. Aortic Valve: The aortic valve appears structurally normal. There is mild to moderate aortic valve cusp calcification. There is evidence of mild aortic valve stenosis. The aortic valve dimensionless index is 0.53. There is no evidence of aortic valve regurgitation. The peak instantaneous gradient of the aortic valve is 9 mmHg. The mean gradient of the aortic valve is 4 mmHg. Mitral Valve: The mitral valve is normal in structure. There is no evidence of mitral valve stenosis. There is normal mitral valve leaflet mobility. There is mild mitral annular calcification. There is mild mitral valve regurgitation.  Tricuspid Valve: The tricuspid valve is structurally normal. There is normal tricuspid valve leaflet mobility. There is mild tricuspid regurgitation. Pulmonic Valve: The pulmonic valve is structurally normal. There is no indication of pulmonic valve regurgitation. Pericardium: Trivial to small pericardial effusion. The effusion is circumferential. Aorta: The aortic root is normal. Pulmonary Artery: Pulmonary hypertension is present. The tricuspid regurgitant velocity is 3.88 m/s, and with an estimated right atrial pressure of 15 mmHg, the estimated pulmonary artery pressure is severely elevated with the RVSP at 75.2 mmHg. Systemic Veins: The inferior vena cava appears mildly dilated. In comparison to the previous echocardiogram(s): There are no prior studies on this patient for comparison purposes.  CONCLUSIONS:  1. The left ventricular systolic function is moderately decreased, with a visually estimated ejection fraction of 35%.  2. There is global hypokinesis of the left ventricle with minor regional variations.  3. Spectral Doppler shows a Grade II (pseudonormal pattern) of left ventricular diastolic filling with an elevated left atrial pressure.  4. There is normal right ventricular global systolic function.  5. Right ventricle is upper limits of normal in size.  6. There is no evidence of mitral valve stenosis.  7. Mild mitral valve regurgitation.  8. Mild tricuspid regurgitation is visualized.  9. Mild aortic valve stenosis. 10. Pulmonary hypertension is present. 11. Severely elevated pulmonary artery pressure. 12. The inferior vena cava appears mildly dilated. RECOMMENDATIONS: Technically suboptimal and limited study, therefore accuracy of above interpretation could be substantially diminished. Clinical correlation is advised. Consider additional imaging modalities if clinically indicated.  QUANTITATIVE DATA SUMMARY:  2D MEASUREMENTS:             Normal Ranges: Ao Root d:       3.70 cm     (2.0-3.7cm) LAs:              4.60 cm     (2.7-4.0cm) IVSd:            1.00 cm     (0.6-1.1cm) LVPWd:           0.90 cm     (0.6-1.1cm) LVIDd:           5.80 cm     (3.9-5.9cm) LVIDs:           4.80 cm LV Mass Index:   96.4 g/m2 LVEDV Index:     98.41 ml/m2 LV % FS          17.2 %  LEFT ATRIUM:                  Normal Ranges: LA Vol A4C:        91.5 ml    (22+/-6mL/m2) LA Vol A2C:        94.8 ml LA Vol BP:         97.9 ml LA Vol Index A4C:  40.4ml/m2 LA Vol Index A2C:  41.9 ml/m2 LA Vol Index BP:   43.3 ml/m2 LA Area A4C:       28.3 cm2 LA Area A2C:       27.4 cm2 LA Major Axis A4C: 7.4 cm LA Major Axis A2C: 6.7 cm LA Volume Index:   41.2 ml/m2  RIGHT ATRIUM:                 Normal Ranges: RA Vol A4C:        58.1 ml    (8.3-19.5ml) RA Vol Index A4C:  25.7 ml/m2 RA Area A4C:       21.8 cm2 RA Major Axis A4C: 7.0 cm  AORTA MEASUREMENTS:         Normal Ranges: Asc Ao, d:          3.10 cm (2.1-3.4cm)  LV SYSTOLIC FUNCTION:                      Normal Ranges: EF-A4C View:    40 % (>=55%) EF-A2C View:    36 % EF-Biplane:     38 % EF-Visual:      35 % LV EF Reported: 35 %  LV DIASTOLIC FUNCTION:           Normal Ranges: MV Peak E:             1.32 m/s  (0.7-1.2 m/s) MV Peak A:             0.91 m/s  (0.42-0.7 m/s) E/A Ratio:             1.45      (1.0-2.2) MV e'                  0.100 m/s (>8.0) MV lateral e'          0.12 m/s MV medial e'           0.08 m/s E/e' Ratio:            13.19     (<8.0)  MITRAL VALVE:          Normal Ranges: MV DT:        121 msec (150-240msec)  AORTIC VALVE:                     Normal Ranges: AoV Vmax:                1.53 m/s (<=1.7m/s) AoV Peak P.4 mmHg (<20mmHg) AoV Mean P.0 mmHg (1.7-11.5mmHg) LVOT Max Benja:            0.82 m/s (<=1.1m/s) AoV VTI:                 28.90 cm (18-25cm) LVOT VTI:                15.40 cm LVOT Diameter:           2.00 cm  (1.8-2.4cm) AoV Area, VTI:           1.67 cm2 (2.5-5.5cm2) AoV Area,Vmax:           1.67 cm2 (2.5-4.5cm2) AoV Dimensionless Index: 0.53   RIGHT VENTRICLE: RV Basal 3.90 cm RV Mid   3.40 cm RV Major 9.2 cm TAPSE:   20.7 mm RV s'    0.18 m/s  TRICUSPID VALVE/RVSP:          Normal Ranges: Peak TR Velocity:     3.88 m/s RV Syst Pressure:     75 mmHg  (< 30mmHg) IVC Diam:             1.90 cm  PULMONIC VALVE:          Normal Ranges: PV Accel Time:  92 msec  (>120ms) PV Max Benja:     1.0 m/s  (0.6-0.9m/s) PV Max P.0 mmHg  [id number] Michael Felipe  Electronically signed on 2025 at 10:14:55 AM  ** Final **      RADIOLOGY:     XR chest 1 view   Final Result   Poor definition of the left hemidiaphragm suggesting left lower lobe   consolidation. Coarse lung markings.        Enlarged cardiac silhouette        MACRO:   none        Signed by: Awa Connell 2/3/2025 8:04 AM   Dictation workstation:   [id number]      XR chest 1 view   Final Result   Unchanged bibasilar atelectasis.        MACRO:   None        Signed by: Omaira Santamaria 2025 11:32 AM   Dictation workstation:   [id number]      US renal complete   Final Result   No hydronephrosis. Chronic medical renal disease.             MACRO:   None        Signed by: Joseph Schoenberger 2025 4:11 PM   Dictation workstation:   [id number]      CT chest w IV contrast   Final Result   1.  Bilateral dependent lower lobe atelectasis   2. 2.4 cm pleural-based ground-glass opacity right upper lobe is new.   Consider follow up non contrast chest CT at 6-12 months to confirm   persistence, then consider CT chest every 2 years until 5   years.(Gary Ghoshhowong et al., Guidelines for management of incidental   pulmonary nodules detected on CT images: From the Fleischner Society   2017, Radiology. 2017 Jul;284 (1):228-243.) [email or web address]        Signed by: Merline Quinn 2025 2:20 PM   Dictation workstation:   [id number]      Transthoracic Echo (TTE) Complete   Final Result      XR chest 1 view   Final Result   Bibasilar airspace opacities, as above. Clinical correlation and   continued  follow-up until clearing is recommended.        MACRO:   None.        Signed by: Darrion Crowley 1/27/2025 2:37 PM   Dictation workstation:   [id number]      CT head wo IV contrast   Final Result   No evidence of acute cortical infarct or intracranial hemorrhage.        MACRO:   None             Signed by: Darrion Crowley 1/27/2025 2:36 PM   Dictation workstation:   [id number]      FL modified barium swallow study    (Results Pending)       PROBLEM LIST     Patient Active Problem List   Diagnosis    Mixed hyperlipidemia    Benign prostatic hyperplasia with urinary frequency    AK (actinic keratosis)    Vertigo    Bilateral tinnitus    Controlled type 2 diabetes mellitus without complication, with long-term current use of insulin (Multi)    Obstructive sleep apnea syndrome    Bilateral hearing loss    Type 2 diabetes mellitus with hyperglycemia, without long-term current use of insulin    Community acquired pneumonia, unspecified laterality       ASSESSMENT:   Community-acquired pneumonia  Acute systolic heart failure NYHA class III EF 35%  Hyponatremia  Hypertension  Dyslipidemia  COLE  History of PE currently on Eliquis  BPH  DM  New Onset atrial flutter      PLAN:     Patient seen and examined in conjunction with QING Rod and agree with the evaluation as noted above.  I have personally interviewed and examined the patient.   I have personally and independently reviewed the pertinentlabs and diagnostic testing.  I have personally verified the elements of the history and physical listed above and changes, if any, are noted below.     89-year-old gentleman with history of CAD, hypertension, dyslipidemia, COLE as well as multiple PVCs who presented increased shortness of breath and fatigue for which she had an echocardiogram that showed moderate LV dysfunction with EF of about 35% for which cardiology was consulted.  He had a remote history of cardiac catheterization that showed an LAD lesion for which the  patient opted for medical management only.  Patient denied any ongoing chest pain but he had increased shortness of breath and was found to have recurrent acquired pneumonia for which she is currently on antibiotics.  He denies orthopnea proximal nocturnal dyspnea.  His initial labs showed BNP of greater than 2400, with a borderline mildly elevated troponin at 23.  EKG shows sinus rhythm with occasional PVCs and low voltage QRS, but no acute ST-T wave changes.     Agree with exam as noted above.  Cardiac exam reveals regular.  Second heart sound with frequent PACs and a soft 2/6 systolic murmur at the left sternal border.  There is reduced breath sounds in the lung bases bilaterally.  There is 2+ bilateral ankle edema.     ASSESSMENT AND PLAN:  New onset cardiomyopathy: Unclear whether this is ischemic or nonischemic, probably mixed, but patient declined to have invasive evaluation with cardiac catheterization and opts for medical management.  We will initiate GDMT with beta-blockers and ARB and uptitrate as blood pressure tolerates.  Will hold off on Aldactone at this time because of a increased risk of hyperkalemia in view of his CKD and other comorbidities.  Will continue with gentle diuresis and transition to p.o. Lasix prior to discharge.  Community acquired pneumonia: Currently on IV antibiotics as noted above, will defer to primary team for continued management.  History of CAD: As reported by the patient with no definite records to confirm, but we will continue with current medical management since patient does decline invasive cardiac catheterization.    1/31/25  Tele monitoring  Spoke at length with patient he is refusing heart catheterization at this time.  I did call his daughter Ange and she is well aware and she states that it is his decision and she will respect that that when she does follow-up with Dr Stubbs if they change his mind that they could sell up as an outpatient  Eliquis 5 mg p.o. twice  daily  Lipitor 40 mg daily  Lasix 20 mg IV push every 12  Toprol-XL 25 mg daily  Diovan 40 mg daily  Add Imdur 60 mg daily  Nephrology for hyponatremia  Due to patient's status medical management only at this present time  Further recommendations per Dr Stubbs    2/2/25  Acute CHF exacerbation: With gross volume overload leading to dilutional hyponatremia, currently being treated with fluid restriction and IV Lasix.  Will defer to nephrology with regards to use of hypertonic saline.  Paroxysmal atrial flutter: The patient had intermittent episodes of atrial flutter, but with adequate rate control and he is already on Eliquis for anticoagulation which we will continue.  Chest pain: Likely secondary to underlying CAD but patient has declined invasive cardiac catheterization as previously noted.  We will continue with current cardiac medications.    2/3/25  ICU monitoring  Renal input thank  you  Eliquis 5 mg p.o. twice daily  Toprol XL 50 mg daily  Lipitor 40 mg daily  Lasix per renal  Diovan 40 mg daily  Imdur 60 mg daily  Medical management only from cardiac standpoint  Further recommendations per Dr Enoc Gardner CNP  [facility]      Of note, this documentation is completed using the Dragon Dictation system (voice recognition software). There may be spelling and/or grammatical errors that were not corrected prior to final submission.    Please do not hesitate to call with questions.  Electronically signed by QING Shore-CNP, on 2/3/2025 at 10:26 AM    Patient seen and examined in conjunction with QING Rod and agree with the evaluation as noted above.  Patient remains in status quo, appears moderately volume overloaded but responding to diuretics.  Once again, he has declined to have any invasive cardiac catheterization so we will continue with current supportive treatment.

## 2025-02-04 ENCOUNTER — APPOINTMENT (OUTPATIENT)
Dept: RADIOLOGY | Facility: HOSPITAL | Age: OVER 89
End: 2025-02-04
Payer: MEDICARE

## 2025-02-04 LAB
ALBUMIN SERPL BCP-MCNC: 3.2 G/DL (ref 3.4–5)
ALP SERPL-CCNC: 119 U/L (ref 33–136)
ALT SERPL W P-5'-P-CCNC: 185 U/L (ref 10–52)
ANION GAP SERPL CALC-SCNC: 14 MMOL/L (ref 10–20)
ANION GAP SERPL CALC-SCNC: 15 MMOL/L (ref 10–20)
ANION GAP SERPL CALC-SCNC: 16 MMOL/L (ref 10–20)
ANION GAP SERPL CALC-SCNC: 16 MMOL/L (ref 10–20)
ANION GAP SERPL CALC-SCNC: 17 MMOL/L (ref 10–20)
AST SERPL W P-5'-P-CCNC: 106 U/L (ref 9–39)
ATRIAL RATE: 128 BPM
BASOPHILS # BLD AUTO: 0.02 X10*3/UL (ref 0–0.1)
BASOPHILS NFR BLD AUTO: 0.1 %
BILIRUB DIRECT SERPL-MCNC: 0.1 MG/DL (ref 0–0.3)
BILIRUB SERPL-MCNC: 0.4 MG/DL (ref 0–1.2)
BUN SERPL-MCNC: 37 MG/DL (ref 6–23)
BUN SERPL-MCNC: 38 MG/DL (ref 6–23)
BUN SERPL-MCNC: 40 MG/DL (ref 6–23)
BUN SERPL-MCNC: 42 MG/DL (ref 6–23)
BUN SERPL-MCNC: 43 MG/DL (ref 6–23)
CALCIUM SERPL-MCNC: 7.2 MG/DL (ref 8.6–10.3)
CALCIUM SERPL-MCNC: 7.2 MG/DL (ref 8.6–10.3)
CALCIUM SERPL-MCNC: 7.3 MG/DL (ref 8.6–10.3)
CHLORIDE SERPL-SCNC: 81 MMOL/L (ref 98–107)
CHLORIDE SERPL-SCNC: 82 MMOL/L (ref 98–107)
CHLORIDE SERPL-SCNC: 83 MMOL/L (ref 98–107)
CHLORIDE SERPL-SCNC: 83 MMOL/L (ref 98–107)
CHLORIDE SERPL-SCNC: 85 MMOL/L (ref 98–107)
CHOLEST SERPL-MCNC: 144 MG/DL (ref 0–199)
CHOLESTEROL/HDL RATIO: 3.3
CO2 SERPL-SCNC: 21 MMOL/L (ref 21–32)
CO2 SERPL-SCNC: 22 MMOL/L (ref 21–32)
CO2 SERPL-SCNC: 22 MMOL/L (ref 21–32)
CO2 SERPL-SCNC: 24 MMOL/L (ref 21–32)
CO2 SERPL-SCNC: 24 MMOL/L (ref 21–32)
CORTIS SERPL-MCNC: 44 UG/DL (ref 2.5–20)
CREAT SERPL-MCNC: 1.25 MG/DL (ref 0.5–1.3)
CREAT SERPL-MCNC: 1.33 MG/DL (ref 0.5–1.3)
CREAT SERPL-MCNC: 1.35 MG/DL (ref 0.5–1.3)
EGFRCR SERPLBLD CKD-EPI 2021: 48 ML/MIN/1.73M*2
EGFRCR SERPLBLD CKD-EPI 2021: 49 ML/MIN/1.73M*2
EGFRCR SERPLBLD CKD-EPI 2021: 53 ML/MIN/1.73M*2
EOSINOPHIL # BLD AUTO: 0 X10*3/UL (ref 0–0.4)
EOSINOPHIL NFR BLD AUTO: 0 %
ERYTHROCYTE [DISTWIDTH] IN BLOOD BY AUTOMATED COUNT: 15.4 % (ref 11.5–14.5)
GLUCOSE BLD MANUAL STRIP-MCNC: 138 MG/DL (ref 74–99)
GLUCOSE BLD MANUAL STRIP-MCNC: 171 MG/DL (ref 74–99)
GLUCOSE BLD MANUAL STRIP-MCNC: 176 MG/DL (ref 74–99)
GLUCOSE BLD MANUAL STRIP-MCNC: 197 MG/DL (ref 74–99)
GLUCOSE SERPL-MCNC: 160 MG/DL (ref 74–99)
GLUCOSE SERPL-MCNC: 171 MG/DL (ref 74–99)
GLUCOSE SERPL-MCNC: 179 MG/DL (ref 74–99)
GLUCOSE SERPL-MCNC: 189 MG/DL (ref 74–99)
GLUCOSE SERPL-MCNC: 205 MG/DL (ref 74–99)
HCT VFR BLD AUTO: 24.5 % (ref 41–52)
HDLC SERPL-MCNC: 43.6 MG/DL
HGB BLD-MCNC: 8 G/DL (ref 13.5–17.5)
HOLD SPECIMEN: NORMAL
IMM GRANULOCYTES # BLD AUTO: 0.61 X10*3/UL (ref 0–0.5)
IMM GRANULOCYTES NFR BLD AUTO: 3.5 % (ref 0–0.9)
LDLC SERPL CALC-MCNC: 84 MG/DL
LYMPHOCYTES # BLD AUTO: 2.27 X10*3/UL (ref 0.8–3)
LYMPHOCYTES NFR BLD AUTO: 13 %
MAGNESIUM SERPL-MCNC: 2.14 MG/DL (ref 1.6–2.4)
MCH RBC QN AUTO: 25 PG (ref 26–34)
MCHC RBC AUTO-ENTMCNC: 32.7 G/DL (ref 32–36)
MCV RBC AUTO: 77 FL (ref 80–100)
MONOCYTES # BLD AUTO: 1.44 X10*3/UL (ref 0.05–0.8)
MONOCYTES NFR BLD AUTO: 8.2 %
NEUTROPHILS # BLD AUTO: 13.17 X10*3/UL (ref 1.6–5.5)
NEUTROPHILS NFR BLD AUTO: 75.2 %
NON HDL CHOLESTEROL: 100 MG/DL (ref 0–149)
NRBC BLD-RTO: 0.2 /100 WBCS (ref 0–0)
OSMOLALITY SERPL: 472 MOSM/KG (ref 280–300)
P OFFSET: 188 MS
P ONSET: 134 MS
PLATELET # BLD AUTO: 446 X10*3/UL (ref 150–450)
POTASSIUM SERPL-SCNC: 4.8 MMOL/L (ref 3.5–5.3)
POTASSIUM SERPL-SCNC: 4.9 MMOL/L (ref 3.5–5.3)
POTASSIUM SERPL-SCNC: 5 MMOL/L (ref 3.5–5.3)
POTASSIUM SERPL-SCNC: 5.2 MMOL/L (ref 3.5–5.3)
POTASSIUM SERPL-SCNC: 5.3 MMOL/L (ref 3.5–5.3)
PR INTERVAL: 158 MS
PROT SERPL-MCNC: 6 G/DL (ref 6.4–8.2)
Q ONSET: 213 MS
QRS COUNT: 21 BEATS
QRS DURATION: 90 MS
QT INTERVAL: 346 MS
QTC CALCULATION(BAZETT): 505 MS
QTC FREDERICIA: 445 MS
R AXIS: 14 DEGREES
RBC # BLD AUTO: 3.2 X10*6/UL (ref 4.5–5.9)
SODIUM SERPL-SCNC: 114 MMOL/L (ref 136–145)
SODIUM SERPL-SCNC: 115 MMOL/L (ref 136–145)
SODIUM SERPL-SCNC: 116 MMOL/L (ref 136–145)
SODIUM SERPL-SCNC: 117 MMOL/L (ref 136–145)
SODIUM SERPL-SCNC: 118 MMOL/L (ref 136–145)
T AXIS: -43 DEGREES
T OFFSET: 386 MS
TRIGL SERPL-MCNC: 83 MG/DL (ref 0–149)
TSH SERPL-ACNC: 1.11 MIU/L (ref 0.44–3.98)
VENTRICULAR RATE: 128 BPM
VLDL: 17 MG/DL (ref 0–40)
WBC # BLD AUTO: 17.5 X10*3/UL (ref 4.4–11.3)

## 2025-02-04 PROCEDURE — 2500000004 HC RX 250 GENERAL PHARMACY W/ HCPCS (ALT 636 FOR OP/ED)

## 2025-02-04 PROCEDURE — 36415 COLL VENOUS BLD VENIPUNCTURE: CPT | Performed by: STUDENT IN AN ORGANIZED HEALTH CARE EDUCATION/TRAINING PROGRAM

## 2025-02-04 PROCEDURE — 83930 ASSAY OF BLOOD OSMOLALITY: CPT | Mod: ELYLAB | Performed by: STUDENT IN AN ORGANIZED HEALTH CARE EDUCATION/TRAINING PROGRAM

## 2025-02-04 PROCEDURE — 2500000001 HC RX 250 WO HCPCS SELF ADMINISTERED DRUGS (ALT 637 FOR MEDICARE OP): Performed by: STUDENT IN AN ORGANIZED HEALTH CARE EDUCATION/TRAINING PROGRAM

## 2025-02-04 PROCEDURE — 80048 BASIC METABOLIC PNL TOTAL CA: CPT | Mod: CCI | Performed by: STUDENT IN AN ORGANIZED HEALTH CARE EDUCATION/TRAINING PROGRAM

## 2025-02-04 PROCEDURE — 82947 ASSAY GLUCOSE BLOOD QUANT: CPT

## 2025-02-04 PROCEDURE — 71045 X-RAY EXAM CHEST 1 VIEW: CPT | Performed by: RADIOLOGY

## 2025-02-04 PROCEDURE — 2500000004 HC RX 250 GENERAL PHARMACY W/ HCPCS (ALT 636 FOR OP/ED): Performed by: STUDENT IN AN ORGANIZED HEALTH CARE EDUCATION/TRAINING PROGRAM

## 2025-02-04 PROCEDURE — 80048 BASIC METABOLIC PNL TOTAL CA: CPT | Performed by: STUDENT IN AN ORGANIZED HEALTH CARE EDUCATION/TRAINING PROGRAM

## 2025-02-04 PROCEDURE — 2500000005 HC RX 250 GENERAL PHARMACY W/O HCPCS: Performed by: STUDENT IN AN ORGANIZED HEALTH CARE EDUCATION/TRAINING PROGRAM

## 2025-02-04 PROCEDURE — 99232 SBSQ HOSP IP/OBS MODERATE 35: CPT | Performed by: NURSE PRACTITIONER

## 2025-02-04 PROCEDURE — 2500000002 HC RX 250 W HCPCS SELF ADMINISTERED DRUGS (ALT 637 FOR MEDICARE OP, ALT 636 FOR OP/ED): Performed by: STUDENT IN AN ORGANIZED HEALTH CARE EDUCATION/TRAINING PROGRAM

## 2025-02-04 PROCEDURE — 80061 LIPID PANEL: CPT

## 2025-02-04 PROCEDURE — 83935 ASSAY OF URINE OSMOLALITY: CPT | Mod: ELYLAB | Performed by: STUDENT IN AN ORGANIZED HEALTH CARE EDUCATION/TRAINING PROGRAM

## 2025-02-04 PROCEDURE — 2500000002 HC RX 250 W HCPCS SELF ADMINISTERED DRUGS (ALT 637 FOR MEDICARE OP, ALT 636 FOR OP/ED): Performed by: NURSE PRACTITIONER

## 2025-02-04 PROCEDURE — 99291 CRITICAL CARE FIRST HOUR: CPT

## 2025-02-04 PROCEDURE — 2020000001 HC ICU ROOM DAILY

## 2025-02-04 PROCEDURE — 71045 X-RAY EXAM CHEST 1 VIEW: CPT

## 2025-02-04 PROCEDURE — 2500000002 HC RX 250 W HCPCS SELF ADMINISTERED DRUGS (ALT 637 FOR MEDICARE OP, ALT 636 FOR OP/ED)

## 2025-02-04 PROCEDURE — 85025 COMPLETE CBC W/AUTO DIFF WBC: CPT | Performed by: STUDENT IN AN ORGANIZED HEALTH CARE EDUCATION/TRAINING PROGRAM

## 2025-02-04 PROCEDURE — 94640 AIRWAY INHALATION TREATMENT: CPT

## 2025-02-04 PROCEDURE — 2500000001 HC RX 250 WO HCPCS SELF ADMINISTERED DRUGS (ALT 637 FOR MEDICARE OP)

## 2025-02-04 PROCEDURE — 83735 ASSAY OF MAGNESIUM: CPT | Performed by: STUDENT IN AN ORGANIZED HEALTH CARE EDUCATION/TRAINING PROGRAM

## 2025-02-04 PROCEDURE — 84443 ASSAY THYROID STIM HORMONE: CPT

## 2025-02-04 PROCEDURE — 84075 ASSAY ALKALINE PHOSPHATASE: CPT

## 2025-02-04 PROCEDURE — 51701 INSERT BLADDER CATHETER: CPT

## 2025-02-04 RX ORDER — QUETIAPINE FUMARATE 25 MG/1
25 TABLET, FILM COATED ORAL ONCE
Status: COMPLETED | OUTPATIENT
Start: 2025-02-04 | End: 2025-02-04

## 2025-02-04 RX ORDER — QUETIAPINE FUMARATE 25 MG/1
25 TABLET, FILM COATED ORAL NIGHTLY
Status: DISCONTINUED | OUTPATIENT
Start: 2025-02-04 | End: 2025-02-05

## 2025-02-04 RX ORDER — INSULIN LISPRO 100 [IU]/ML
0-5 INJECTION, SOLUTION INTRAVENOUS; SUBCUTANEOUS
Status: DISCONTINUED | OUTPATIENT
Start: 2025-02-04 | End: 2025-02-07 | Stop reason: HOSPADM

## 2025-02-04 RX ORDER — SODIUM CHLORIDE 9 MG/ML
65 INJECTION, SOLUTION INTRAVENOUS CONTINUOUS
Status: ACTIVE | OUTPATIENT
Start: 2025-02-04 | End: 2025-02-04

## 2025-02-04 RX ORDER — TOLVAPTAN 15 MG/1
30 TABLET ORAL ONCE
Status: COMPLETED | OUTPATIENT
Start: 2025-02-04 | End: 2025-02-04

## 2025-02-04 RX ADMIN — IPRATROPIUM BROMIDE AND ALBUTEROL SULFATE 3 ML: .5; 3 SOLUTION RESPIRATORY (INHALATION) at 07:07

## 2025-02-04 RX ADMIN — APIXABAN 5 MG: 5 TABLET, FILM COATED ORAL at 08:04

## 2025-02-04 RX ADMIN — ALBUTEROL SULFATE 2 PUFF: 108 INHALANT RESPIRATORY (INHALATION) at 14:47

## 2025-02-04 RX ADMIN — PIPERACILLIN SODIUM AND TAZOBACTAM SODIUM 3.38 G: 3; .375 INJECTION, SOLUTION INTRAVENOUS at 08:07

## 2025-02-04 RX ADMIN — PIPERACILLIN SODIUM AND TAZOBACTAM SODIUM 3.38 G: 3; .375 INJECTION, SOLUTION INTRAVENOUS at 00:44

## 2025-02-04 RX ADMIN — GUAIFENESIN 600 MG: 600 TABLET, EXTENDED RELEASE ORAL at 20:04

## 2025-02-04 RX ADMIN — SODIUM ZIRCONIUM CYCLOSILICATE 10 G: 10 POWDER, FOR SUSPENSION ORAL at 12:02

## 2025-02-04 RX ADMIN — Medication 4 L/MIN: at 20:13

## 2025-02-04 RX ADMIN — IPRATROPIUM BROMIDE AND ALBUTEROL SULFATE 3 ML: .5; 3 SOLUTION RESPIRATORY (INHALATION) at 13:25

## 2025-02-04 RX ADMIN — Medication 40 PERCENT: at 07:08

## 2025-02-04 RX ADMIN — METOPROLOL SUCCINATE 25 MG: 25 TABLET, EXTENDED RELEASE ORAL at 08:04

## 2025-02-04 RX ADMIN — TAMSULOSIN HYDROCHLORIDE 0.8 MG: 0.4 CAPSULE ORAL at 08:04

## 2025-02-04 RX ADMIN — QUETIAPINE FUMARATE 25 MG: 25 TABLET, FILM COATED ORAL at 20:04

## 2025-02-04 RX ADMIN — ATORVASTATIN CALCIUM 40 MG: 20 TABLET, FILM COATED ORAL at 20:04

## 2025-02-04 RX ADMIN — TOLVAPTAN 30 MG: 15 TABLET ORAL at 12:01

## 2025-02-04 RX ADMIN — INSULIN LISPRO 1 UNITS: 100 INJECTION, SOLUTION INTRAVENOUS; SUBCUTANEOUS at 08:04

## 2025-02-04 RX ADMIN — ISOSORBIDE MONONITRATE 60 MG: 60 TABLET, EXTENDED RELEASE ORAL at 08:04

## 2025-02-04 RX ADMIN — SODIUM CHLORIDE 50 ML/HR: 9 INJECTION, SOLUTION INTRAVENOUS at 10:47

## 2025-02-04 RX ADMIN — PIPERACILLIN SODIUM AND TAZOBACTAM SODIUM 3.38 G: 3; .375 INJECTION, SOLUTION INTRAVENOUS at 20:22

## 2025-02-04 RX ADMIN — GUAIFENESIN 600 MG: 600 TABLET, EXTENDED RELEASE ORAL at 08:04

## 2025-02-04 RX ADMIN — IPRATROPIUM BROMIDE AND ALBUTEROL SULFATE 3 ML: .5; 3 SOLUTION RESPIRATORY (INHALATION) at 20:17

## 2025-02-04 RX ADMIN — BENZONATATE 200 MG: 100 CAPSULE ORAL at 14:47

## 2025-02-04 RX ADMIN — PIPERACILLIN SODIUM AND TAZOBACTAM SODIUM 3.38 G: 3; .375 INJECTION, SOLUTION INTRAVENOUS at 12:02

## 2025-02-04 RX ADMIN — INSULIN LISPRO 1 UNITS: 100 INJECTION, SOLUTION INTRAVENOUS; SUBCUTANEOUS at 20:19

## 2025-02-04 RX ADMIN — POLYETHYLENE GLYCOL 3350 17 G: 17 POWDER, FOR SOLUTION ORAL at 08:04

## 2025-02-04 RX ADMIN — QUETIAPINE FUMARATE 25 MG: 25 TABLET, FILM COATED ORAL at 01:31

## 2025-02-04 RX ADMIN — Medication 5 MG: at 20:04

## 2025-02-04 RX ADMIN — SODIUM ZIRCONIUM CYCLOSILICATE 10 G: 10 POWDER, FOR SUSPENSION ORAL at 03:44

## 2025-02-04 RX ADMIN — ACETAMINOPHEN 650 MG: 325 TABLET ORAL at 09:09

## 2025-02-04 RX ADMIN — SODIUM ZIRCONIUM CYCLOSILICATE 10 G: 10 POWDER, FOR SUSPENSION ORAL at 20:04

## 2025-02-04 RX ADMIN — INSULIN LISPRO 1 UNITS: 100 INJECTION, SOLUTION INTRAVENOUS; SUBCUTANEOUS at 17:07

## 2025-02-04 RX ADMIN — STANDARDIZED SENNA CONCENTRATE 17.2 MG: 8.6 TABLET ORAL at 20:04

## 2025-02-04 RX ADMIN — APIXABAN 5 MG: 5 TABLET, FILM COATED ORAL at 20:04

## 2025-02-04 ASSESSMENT — COGNITIVE AND FUNCTIONAL STATUS - GENERAL
MOVING TO AND FROM BED TO CHAIR: A LOT
EATING MEALS: A LITTLE
CLIMB 3 TO 5 STEPS WITH RAILING: A LOT
TURNING FROM BACK TO SIDE WHILE IN FLAT BAD: A LITTLE
MOVING TO AND FROM BED TO CHAIR: A LITTLE
TURNING FROM BACK TO SIDE WHILE IN FLAT BAD: A LITTLE
PERSONAL GROOMING: A LITTLE
DRESSING REGULAR UPPER BODY CLOTHING: A LITTLE
DRESSING REGULAR LOWER BODY CLOTHING: A LITTLE
DAILY ACTIVITIY SCORE: 18
CLIMB 3 TO 5 STEPS WITH RAILING: A LOT
HELP NEEDED FOR BATHING: A LITTLE
MOBILITY SCORE: 17
WALKING IN HOSPITAL ROOM: A LOT
PERSONAL GROOMING: A LITTLE
DRESSING REGULAR UPPER BODY CLOTHING: A LITTLE
HELP NEEDED FOR BATHING: A LITTLE
STANDING UP FROM CHAIR USING ARMS: A LITTLE
MOBILITY SCORE: 15
DRESSING REGULAR LOWER BODY CLOTHING: A LITTLE
DAILY ACTIVITIY SCORE: 19
TOILETING: A LITTLE
WALKING IN HOSPITAL ROOM: A LOT
TOILETING: A LITTLE
STANDING UP FROM CHAIR USING ARMS: A LOT

## 2025-02-04 ASSESSMENT — PAIN SCALES - GENERAL
PAINLEVEL_OUTOF10: 0 - NO PAIN
PAINLEVEL_OUTOF10: 5 - MODERATE PAIN
PAINLEVEL_OUTOF10: 0 - NO PAIN
PAINLEVEL_OUTOF10: 0 - NO PAIN
PAINLEVEL_OUTOF10: 3
PAINLEVEL_OUTOF10: 8
PAINLEVEL_OUTOF10: 0 - NO PAIN
PAINLEVEL_OUTOF10: 0 - NO PAIN

## 2025-02-04 ASSESSMENT — PAIN - FUNCTIONAL ASSESSMENT
PAIN_FUNCTIONAL_ASSESSMENT: 0-10

## 2025-02-04 NOTE — PROGRESS NOTES
Vancomycin Dosing by Pharmacy- Cessation of Therapy    Consult to pharmacy for vancomycin dosing has been discontinued by the prescriber, pharmacy will sign off at this time.    Please call pharmacy if there are further questions or re-enter a consult if vancomycin is resumed.     Shweta Gomez, PharmD

## 2025-02-04 NOTE — PROGRESS NOTES
[facility] Critical Care Medicine       Date:  2/4/2025  Patient:  Chris Russell  YOB: 1930  MRN:  [id number]   Admit Date:  1/27/2025  ========================================================================================================    Chief Complaint   Patient presents with    Altered Mental Status     Pt was at home and daughter states about 3-4 hours ago he became disoriented and not making sense with his words. Daughter states he was having a hard time getting words out. Pt daughter states this episode lasted around 5 minutes and he went back to normal. Pt daughter also states that he has had a cough for 3 days which is affecting his breathing.          History of Present Illness:  Chris Russell is a 89 y.o. year old male patient with Past Medical History of PE on Eliquis, DM, HLD, BPH who presented with complaints of difficulty breathing and confusion.  Patient informed staff in the emergency department that he was not feeling well over period of several days prior to presentation.  He does have cough with chest congestion.  Patient's daughter noted that the patient appeared confused to her which is what prompted evaluation in the emergency department.  Imaging suggesting pneumonia.  Patient was started on empiric antibiotic coverage with ceftriaxone/azithromycin.  From a respiratory standpoint his condition was making slow steady progress.  However, the patient began having a precipitous drop in his sodium level.  Initial value was 136 and the most recent value this morning at 112.  Patient has received tolvaptan without significant response.  I was notified this morning that they would like to initiate hypertonic saline and will need transfer to ICU.     When I came to assess the patient he was looking comfortable on nasal cannula.  He did had a fairly extensive edema on his lower extremities.  His appetite is good and he had just finished his breakfast when I entered the room.   Currently afebrile and hemodynamically stable.  Review of the patient's orders show that he did not have a volume restriction but did have a salt restriction.         Interval ICU Events:  2/2: -transfer to ICU for hypertonic saline.  Otherwise hemodynamically stable and saturating well on nasal cannula.     2/3: Started on hypertonic saline overnight for 2 hours.  Repeat sodium level is 117.  Had some respiratory distress overnight with chest x-ray showing continued pneumonia.  Given 40 of Lasix with 800 of output in 3 hours.  White blood cell count is up to 18 today.  Received 3 days of Rocephin and 2 days of Zithromax.  Down to 4 L nasal cannula this morning.  Still states he is short of breath with congested and wet lung sounds.  Also had some confusion overnight.  Became combative and was placed in restraints.    2/4: Patient with agitation overnight requiring 25 mg Seroquel with improvement.  This morning patient is awake, alert, oriented x 3.  Had multiple episodes of urinary retention >500, will place Short catheter today.  Renal function slightly improved with IVF, creatinine 1.25 today.  Sodium remains low at 115.  WBC 17.5.  MRSA PCR nares negative, de-escalate antibiotics to Zosyn alone.    Medical History:  Past Medical History:   Diagnosis Date    Coronary artery disease      Past Surgical History:   Procedure Laterality Date    ABDOMINAL AORTIC ANEURYSM REPAIR      CHOLECYSTECTOMY       Medications Prior to Admission   Medication Sig Dispense Refill Last Dose/Taking    albuterol (Ventolin HFA) 90 mcg/actuation inhaler Inhale 2 puffs every 4 hours if needed for wheezing or shortness of breath. 18 g 2 1/27/2025    atorvastatin (Lipitor) 40 mg tablet TAKE 1 TABLET BY MOUTH EVERY DAY 90 tablet 3 1/26/2025    Eliquis 5 mg tablet Take 1 tablet (5 mg) by mouth 2 times a day. 60 tablet 3 1/27/2025    multivitamin capsule Take 1 capsule by mouth once daily.   1/27/2025    tamsulosin (Flomax) 0.4 mg 24 hr  capsule TAKE 2 CAPSULES BY MOUTH ONCE DAILY 180 capsule 1 1/26/2025    apixaban (Eliquis DVT-PE Treat 30D Start) 5 mg (74 tabs) tablets,dose pack Take 2 tablets (10 mg) by mouth every 12 hours.       benzocaine-menthoL (Cepacol Sore Throat, lyndsey-men,) 15-2.3 mg lozenge Place into mouth between cheek and gum.   Unknown    traZODone (Desyrel) 50 mg tablet TAKE 1 TABLET (50 MG) BY MOUTH ONCE DAILY AT BEDTIME. 90 tablet 0      Patient has no known allergies.  Social History     Tobacco Use    Smoking status: Never     Passive exposure: Never    Smokeless tobacco: Never   Substance Use Topics    Alcohol use: Never    Drug use: Never     Family History   Problem Relation Name Age of Onset    No Known Problems Mother      No Known Problems Father         Review of Systems:  14 point review of systems was completed and negative except for those specially mention in my HPI    Physical Exam:    Heart Rate:  []   Temp:  [36 °C (96.8 °F)-36.6 °C (97.8 °F)]   Resp:  [14-27]   BP: (102-153)/(47-71)   Weight:  [105 kg (231 lb 4.2 oz)-107 kg (234 lb 12.6 oz)]   SpO2:  [87 %-99 %]     Physical Exam  Vitals reviewed.   Constitutional:       Appearance: Normal appearance.   HENT:      Head: Normocephalic.      Nose: Nose normal.      Mouth/Throat:      Mouth: Mucous membranes are moist.      Pharynx: Oropharynx is clear.   Eyes:      Extraocular Movements: Extraocular movements intact.      Pupils: Pupils are equal, round, and reactive to light.   Cardiovascular:      Pulses: Normal pulses.      Heart sounds: Normal heart sounds.      Comments: Sinus tachycardia  Pulmonary:      Comments: Coarse crackles with rhonchi throughout, congested and productive cough, no wheezing, mild tachypnea  Abdominal:      General: Abdomen is flat.      Palpations: Abdomen is soft.   Musculoskeletal:         General: Normal range of motion.      Cervical back: Normal range of motion and neck supple.   Skin:     General: Skin is warm and dry.       Capillary Refill: Capillary refill takes less than 2 seconds.   Neurological:      General: No focal deficit present.      Mental Status: He is alert.      Comments: Disoriented to place and time, oriented to self, awakens easily and answers questions appropriately         Objective:    I have reviewed all medications, laboratory results, and imaging pertinent for today's encounter    Assessment/Plan:    I am currently managing this critically ill patient for the following problems:    Neuro/Psych/Pain Ctrl/Sedation:  #Hospital-acquired delirium  -- Neurochecks, CAM assessment, delirium precautions  -- DC trazodone and tramadol as can contribute to hyponatremia  -- Initiate Seroquel 25 mg nightly    Respiratory/ENT:  #Acute hypoxic respiratory failure  #Hospital Acquired Pneumonia  #Every day smoker, likely undiagnosed COPD  :CXR 2/3 with LLL infiltrate  -- Abx as per ID section  -- Inhaled bronchodilators  -- Pulmonary hygiene  -- On 4L nasal cannula, wean supplemental O2 for SpO2 goal >92%    Cardiovascular:  #Acute on chronic systolic heart failure, last known EF 35%  #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  Did not significantly improve with salt load or diuretics  -- Continue NS at 50 mL/h  -- Lokelma  -- Every 6 hours BMP  -- Nephrology following    #Urinary retention  -- Continue home Flomax  -- Short catheter placed 2/4 -> will void trial after 24-48 hours    Endocrine  #DM 2  -- Lispro #1 SSI ACHS    Infectious Disease:  #Hospital Acquired Pneumonia  -- MRSA nares negative  -- Zosyn for  empiric coverage, plan for 7 day course    Heme/Onc:  #History of pulmonary embolism on Eliquis  -- Continue Eliquis  -- Daily CBC    OBGYN/MSK:  #Generalized deconditioning  -- PT OT    Ethics/Code Status:  DNR/DNI    :  DVT Prophylaxis: Eliquis  GI Prophylaxis: None  Bowel Regimen: As needed  Diet: Regular  CVC: None  Ligia: None  Short: Yes (2/4)  Restraints: Off  Dispo: ICU    Critical Care Time:  35 minutes spent in preparing to see patient (I.e. review of medical records), evaluation of diagnostics (I.e. labs, imaging, etc.), documentation, discussing plan of care with patient/ family/ caregiver, and/ or coordination of care with multidisciplinary team. Time does not include completion of procedure time.     QING Simon-CNP  Pulmonary & Critical Care Medicine  [facility]

## 2025-02-04 NOTE — PROGRESS NOTES
Occupational Therapy                 Therapy Communication Note    Patient Name: Chris Russell  MRN: [id number]  Department: [facility]  Room: 09/09-A  Today's Date: 2/4/2025     Discipline: Occupational Therapy    Missed Visit Reason: Missed Visit Reason:  (Attempted OT tx, pt currently receiving nsg care and has not ate breakfast.  RN also needs to place vickers prior to OT.  Prabhjot lreattempt as schedule allows)    Missed Time: Attempt at 8:25

## 2025-02-04 NOTE — CARE PLAN
Problem: Safety - Adult  Goal: Free from fall injury  Outcome: Progressing     Problem: Discharge Planning  Goal: Discharge to home or other facility with appropriate resources  Outcome: Progressing     Problem: Chronic Conditions and Co-morbidities  Goal: Patient's chronic conditions and co-morbidity symptoms are monitored and maintained or improved  Outcome: Progressing     Problem: Nutrition  Goal: Nutrient intake appropriate for maintaining nutritional needs  Outcome: Progressing     Problem: Diabetes  Goal: Achieve decreasing blood glucose levels by end of shift  Outcome: Progressing  Goal: Increase stability of blood glucose readings by end of shift  Outcome: Progressing  Goal: Decrease in ketones present in urine by end of shift  Outcome: Progressing  Goal: Maintain electrolyte levels within acceptable range throughout shift  Outcome: Progressing  Goal: Maintain glucose levels >70mg/dl to <250mg/dl throughout shift  Outcome: Progressing  Goal: No changes in neurological exam by end of shift  Outcome: Progressing  Goal: Learn about and adhere to nutrition recommendations by end of shift  Outcome: Progressing  Goal: Vital signs within normal range for age by end of shift  Outcome: Progressing  Goal: Increase self care and/or family involovement by end of shift  Outcome: Progressing  Goal: Receive DSME education by end of shift  Outcome: Progressing     Problem: Fall/Injury  Goal: Not fall by end of shift  Outcome: Progressing  Goal: Be free from injury by end of the shift  Outcome: Progressing  Goal: Verbalize understanding of personal risk factors for fall in the hospital  Outcome: Progressing  Goal: Verbalize understanding of risk factor reduction measures to prevent injury from fall in the home  Outcome: Progressing  Goal: Use assistive devices by end of the shift  Outcome: Progressing  Goal: Pace activities to prevent fatigue by end of the shift  Outcome: Progressing   The patient's goals for the shift  include to be able to get home    The clinical goals for the shift include Patient will remain free of falls throughout this shift

## 2025-02-04 NOTE — PROGRESS NOTES
"Chris Russell is a 89 y.o. male on day 8 of admission presenting with Community acquired pneumonia, unspecified laterality.    Subjective   Pt appears more confused this am.  Attempting to pick at vickers catheter.  Recognizes family at bedside.      Objective     Physical Exam  GEN: awake and alert, appears more confused this am    Last Recorded Vitals  Blood pressure 114/56, pulse 66, temperature 36.4 °C (97.6 °F), temperature source Temporal, resp. rate 16, height 1.88 m (6' 2\"), weight 107 kg (234 lb 12.6 oz), SpO2 99%.  Intake/Output last 3 Shifts:  I/O last 3 completed shifts:  In: 3122.5 (29.3 mL/kg) [P.O.:1540; I.V.:832.5 (7.8 mL/kg); IV Piggyback:750]  Out: 3900 (36.6 mL/kg) [Urine:3900 (1 mL/kg/hr)]  Weight: 106.5 kg     Relevant Results  Scheduled medications  apixaban, 5 mg, oral, BID  atorvastatin, 40 mg, oral, Nightly  guaiFENesin, 600 mg, oral, BID  insulin lispro, 0-5 Units, subcutaneous, Before meals & nightly  ipratropium-albuteroL, 3 mL, nebulization, TID  isosorbide mononitrate ER, 60 mg, oral, Daily  metoprolol succinate XL, 25 mg, oral, Daily  oxygen, , inhalation, Continuous - Inhalation  piperacillin-tazobactam, 3.375 g, intravenous, q8h  polyethylene glycol, 17 g, oral, Daily  QUEtiapine, 25 mg, oral, Nightly  sennosides, 2 tablet, oral, Nightly  sodium zirconium cyclosilicate, 10 g, oral, q8h  tamsulosin, 0.8 mg, oral, Daily  tolvaptan, 30 mg, oral, Once  [Held by provider] valsartan, 40 mg, oral, Daily      Continuous medications  sodium chloride 0.9%, 50 mL/hr, Last Rate: 50 mL/hr (02/04/25 1047)      PRN medications  PRN medications: acetaminophen **OR** acetaminophen **OR** acetaminophen, albuterol, benzonatate, bisacodyl, calcium carbonate, dextrose, dextrose, glucagon, glucagon, hydrALAZINE, ipratropium-albuteroL, labetaloL, magnesium hydroxide, melatonin, nitroglycerin, ondansetron     Results for orders placed or performed during the hospital encounter of 01/27/25 (from the past 24 " hours)   POCT GLUCOSE   Result Value Ref Range    POCT Glucose 235 (H) 74 - 99 mg/dL   Vancomycin   Result Value Ref Range    Vancomycin 17.7 5.0 - 20.0 ug/mL   Basic metabolic panel   Result Value Ref Range    Glucose 255 (H) 74 - 99 mg/dL    Sodium 115 (LL) 136 - 145 mmol/L    Potassium 5.1 3.5 - 5.3 mmol/L    Chloride 81 (L) 98 - 107 mmol/L    Bicarbonate 22 21 - 32 mmol/L    Anion Gap 17 10 - 20 mmol/L    Urea Nitrogen 39 (H) 6 - 23 mg/dL    Creatinine 1.32 (H) 0.50 - 1.30 mg/dL    eGFR 50 (L) >60 mL/min/1.73m*2    Calcium 7.1 (L) 8.6 - 10.3 mg/dL   POCT GLUCOSE   Result Value Ref Range    POCT Glucose 239 (H) 74 - 99 mg/dL   Vancomycin   Result Value Ref Range    Vancomycin 11.8 5.0 - 20.0 ug/mL   Basic metabolic panel   Result Value Ref Range    Glucose 205 (H) 74 - 99 mg/dL    Sodium 114 (LL) 136 - 145 mmol/L    Potassium 5.3 3.5 - 5.3 mmol/L    Chloride 81 (L) 98 - 107 mmol/L    Bicarbonate 22 21 - 32 mmol/L    Anion Gap 16 10 - 20 mmol/L    Urea Nitrogen 43 (H) 6 - 23 mg/dL    Creatinine 1.35 (H) 0.50 - 1.30 mg/dL    eGFR 48 (L) >60 mL/min/1.73m*2    Calcium 7.3 (L) 8.6 - 10.3 mg/dL   Magnesium   Result Value Ref Range    Magnesium 2.14 1.60 - 2.40 mg/dL   Basic Metabolic Panel   Result Value Ref Range    Glucose 189 (H) 74 - 99 mg/dL    Sodium 115 (LL) 136 - 145 mmol/L    Potassium 5.0 3.5 - 5.3 mmol/L    Chloride 82 (L) 98 - 107 mmol/L    Bicarbonate 22 21 - 32 mmol/L    Anion Gap 16 10 - 20 mmol/L    Urea Nitrogen 42 (H) 6 - 23 mg/dL    Creatinine 1.25 0.50 - 1.30 mg/dL    eGFR 53 (L) >60 mL/min/1.73m*2    Calcium 7.2 (L) 8.6 - 10.3 mg/dL   CBC and Auto Differential   Result Value Ref Range    WBC 17.5 (H) 4.4 - 11.3 x10*3/uL    nRBC 0.2 (H) 0.0 - 0.0 /100 WBCs    RBC 3.20 (L) 4.50 - 5.90 x10*6/uL    Hemoglobin 8.0 (L) 13.5 - 17.5 g/dL    Hematocrit 24.5 (L) 41.0 - 52.0 %    MCV 77 (L) 80 - 100 fL    MCH 25.0 (L) 26.0 - 34.0 pg    MCHC 32.7 32.0 - 36.0 g/dL    RDW 15.4 (H) 11.5 - 14.5 %    Platelets 446  150 - 450 x10*3/uL    Neutrophils % 75.2 40.0 - 80.0 %    Immature Granulocytes %, Automated 3.5 (H) 0.0 - 0.9 %    Lymphocytes % 13.0 13.0 - 44.0 %    Monocytes % 8.2 2.0 - 10.0 %    Eosinophils % 0.0 0.0 - 6.0 %    Basophils % 0.1 0.0 - 2.0 %    Neutrophils Absolute 13.17 (H) 1.60 - 5.50 x10*3/uL    Immature Granulocytes Absolute, Automated 0.61 (H) 0.00 - 0.50 x10*3/uL    Lymphocytes Absolute 2.27 0.80 - 3.00 x10*3/uL    Monocytes Absolute 1.44 (H) 0.05 - 0.80 x10*3/uL    Eosinophils Absolute 0.00 0.00 - 0.40 x10*3/uL    Basophils Absolute 0.02 0.00 - 0.10 x10*3/uL   TSH with reflex to Free T4 if abnormal   Result Value Ref Range    Thyroid Stimulating Hormone 1.11 0.44 - 3.98 mIU/L   POCT GLUCOSE   Result Value Ref Range    POCT Glucose 171 (H) 74 - 99 mg/dL   SST TOP   Result Value Ref Range    Extra Tube Hold for add-ons.    Basic Metabolic Panel   Result Value Ref Range    Glucose 171 (H) 74 - 99 mg/dL    Sodium 116 (LL) 136 - 145 mmol/L    Potassium 4.9 3.5 - 5.3 mmol/L    Chloride 83 (L) 98 - 107 mmol/L    Bicarbonate 24 21 - 32 mmol/L    Anion Gap 14 10 - 20 mmol/L    Urea Nitrogen 40 (H) 6 - 23 mg/dL    Creatinine 1.33 (H) 0.50 - 1.30 mg/dL    eGFR 49 (L) >60 mL/min/1.73m*2    Calcium 7.2 (L) 8.6 - 10.3 mg/dL      Assessment/Plan   -Met with patient and his son and daughter at bedside.  Introduced myself as I had spoken to the daughter on the phone yesterday.  She sent POA paperwork but does not cover healthcare.  She believes it may be at home so requested they provide a copy if they have access to it.  Discussed plan of care.  They appear unsure if would want to pursue dialysis but explained would likely be temporary to correct electrolytes and remove fluid, not for renal failure.  Further discussions pending Nephrology recs in the coming days.  I spent 25minutes in the professional and overall care of this patient.      Flex Meyer, APRN-CNP

## 2025-02-04 NOTE — CARE PLAN
"The patient's goals for the shift include \"To not drive you guys as crazy tonight\"  Problem: Safety - Adult  Goal: Free from fall injury  Flowsheets (Taken 2/3/2025 2133)  Free from fall injury: Instruct family/caregiver on patient safety     Problem: Discharge Planning  Goal: Discharge to home or other facility with appropriate resources  Flowsheets (Taken 2/3/2025 2133)  Discharge to home or other facility with appropriate resources:   Identify barriers to discharge with patient and caregiver   Identify discharge learning needs (meds, wound care, etc)     Problem: Chronic Conditions and Co-morbidities  Goal: Patient's chronic conditions and co-morbidity symptoms are monitored and maintained or improved  Flowsheets (Taken 2/3/2025 2133)  Care Plan - Patient's Chronic Conditions and Co-Morbidity Symptoms are Monitored and Maintained or Improved:   Monitor and assess patient's chronic conditions and comorbid symptoms for stability, deterioration, or improvement   Collaborate with multidisciplinary team to address chronic and comorbid conditions and prevent exacerbation or deterioration     Problem: Diabetes  Goal: Achieve decreasing blood glucose levels by end of shift  Flowsheets (Taken 2/3/2025 2133)  Achieve decreasing blood glucose levels by end of shift:   Med administration/monitoring of effect   Self monitor blood glucose with staff oversight  Goal: Increase stability of blood glucose readings by end of shift  Flowsheets (Taken 2/3/2025 2133)  Increase stability of blood glucose readings by end of shift: Med administration/monitoring of effect     Problem: Skin  Goal: Decreased wound size/increased tissue granulation at next dressing change  Flowsheets (Taken 2/3/2025 2133)  Decreased wound size/increased tissue granulation at next dressing change:   Protective dressings over bony prominences   Promote sleep for wound healing  Goal: Participates in plan/prevention/treatment measures  Flowsheets (Taken 2/3/2025 " 2133)  Participates in plan/prevention/treatment measures:   Discuss with provider PT/OT consult   Elevate heels   Increase activity/out of bed for meals  Goal: Prevent/manage excess moisture  Flowsheets (Taken 2/3/2025 2133)  Prevent/manage excess moisture:   Cleanse incontinence/protect with barrier cream   Monitor for/manage infection if present  Goal: Prevent/minimize sheer/friction injuries  Flowsheets (Taken 2/3/2025 2133)  Prevent/minimize sheer/friction injuries:   Use pull sheet   Increase activity/out of bed for meals  Goal: Promote/optimize nutrition  Flowsheets (Taken 2/3/2025 2133)  Promote/optimize nutrition:   Offer water/supplements/favorite foods   Consume > 50% meals/supplements   Monitor/record intake including meals  Goal: Promote skin healing  Flowsheets (Taken 2/3/2025 2133)  Promote skin healing:   Turn/reposition every 2 hours/use positioning/transfer devices   Assess skin/pad under line(s)/device(s)   Protective dressings over bony prominences       The clinical goals for the shift include Patient will remain free of falls throughout shift

## 2025-02-04 NOTE — PROGRESS NOTES
Vancomycin Dosing by Pharmacy- FOLLOW UP    Chris Russell is a 89 y.o. year old male who Pharmacy has been consulted for vancomycin dosing for pneumonia. Based on the patient's indication and renal status this patient is being dosed based on a goal AUC of 400-600.     Renal function is currently stable.    Current vancomycin dose: 1250 mg given every 24 hours    Estimated vancomycin AUC on current dose: 465-528 mg/L.hr     Visit Vitals  /64   Pulse 70   Temp 36.5 °C (97.7 °F) (Temporal)   Resp 23        Lab Results   Component Value Date    CREATININE 1.32 (H) 2025    CREATININE 1.26 2025    CREATININE 1.29 2025    CREATININE 1.36 (H) 2025        Patient weight is as follows:   Vitals:    25 1029   Weight: 105 kg (231 lb 4.2 oz)       Cultures:  No results found for the encounter in last 14 days.       I/O last 3 completed shifts:  In: 2512.8 (24 mL/kg) [P.O.:1762; I.V.:100.8 (1 mL/kg); IV Piggyback:650]  Out: 3400 (32.4 mL/kg) [Urine:3400 (0.9 mL/kg/hr)]  Weight: 104.9 kg   I/O during current shift:  No intake/output data recorded.    Temp (24hrs), Av.5 °C (97.7 °F), Min:36.2 °C (97.2 °F), Max:36.8 °C (98.2 °F)      Assessment/Plan    Within goal AUC range. Continue current vancomycin regimen.    This dosing regimen is predicted by [employer] to result in the following pharmacokinetic parameters:    Loading dose: N/A  Regimen: 1250 mg IV every 24 hours.  Start time: 12:00 on 2025  Exposure target: AUC24 (range)400-600 mg/L.hr   [id number]: 465 mg/L.hr  AUC24,ss: 528 mg/L.hr  Probability of AUC24 > 400: 90 %  Ctrough,ss: 16.3 mg/L  Probability of Ctrough,ss > 20: 25 %    The next level will be obtained on 2025 at 0500. May be obtained sooner if clinically indicated.   Will continue to monitor renal function daily while on vancomycin and order serum creatinine at least every 48 hours if not already ordered.  Follow for continued vancomycin needs, clinical response,  and signs/symptoms of toxicity.       Lety Mabry, [facility]

## 2025-02-04 NOTE — PROGRESS NOTES
Nephrology Progress Note    Assessment:  89 y.o. yo male admitted with sob.  Found to have PNA and new CHF with ef 35% and pulm HTN.  Na 136 on admission but down to 118 when initial consult done.  Looks hypervolemic.  Sodium level was dropping even before he got lasix so cannot blame entirely on lasix.  On SSRI.  Urine sg 1.019 on admission.  NO hydronephrosis noted on renal ultrasound, has nml sized kidneys. Poor response to diuretics, tolvaptan and salt tabs. Only responded to 3% NS      Plan:  - continue NS at 50 ml/hr for now (Na improving slowly with this even though patient appears fluid overloaded)  - will give another dose of tolvaptan 30 mg and reassess (failed multiple doses in the past)  - keep holding on diuretics for now, responded very poorly to this, (Note that Khalida 24 and Uosm in the 400s and Cl trending down s/o element of volume depletion however pt appears hypervolemic and endorses orthopnea (CXR doesn't have significant edema) making picture unclear, BNP elevated as well)  - hold on 3% as well to avoid fluid overload   - if Na doesn't improve to 120s by tomorrow will opt for having the patient on RRT for electrolyte correction and fluid removal   - keep valsartan on hold as well      Outpatient follow up from renal standpoint: TBD       Subjective:  Admit Date: 1/27/2025    Interval History: continues on NS, Na slowly uptrending, voiding more, renal function stable, slightly more edematous on CXR    Medications:  Scheduled Meds:apixaban, 5 mg, oral, BID  atorvastatin, 40 mg, oral, Nightly  guaiFENesin, 600 mg, oral, BID  insulin lispro, 0-5 Units, subcutaneous, Before meals & nightly  ipratropium-albuteroL, 3 mL, nebulization, TID  isosorbide mononitrate ER, 60 mg, oral, Daily  metoprolol succinate XL, 25 mg, oral, Daily  oxygen, , inhalation, Continuous - Inhalation  piperacillin-tazobactam, 3.375 g, intravenous, q8h  polyethylene glycol, 17 g, oral, Daily  QUEtiapine, 25 mg, oral,  "Nightly  sennosides, 2 tablet, oral, Nightly  sodium zirconium cyclosilicate, 10 g, oral, q8h  tamsulosin, 0.8 mg, oral, Daily  tolvaptan, 30 mg, oral, Once  [Held by provider] valsartan, 40 mg, oral, Daily      Continuous Infusions:sodium chloride 0.9%, 50 mL/hr        CBC:   Lab Results   Component Value Date    WBC 17.5 (H) 02/04/2025    RBC 3.20 (L) 02/04/2025    HGB 8.0 (L) 02/04/2025    HCT 24.5 (L) 02/04/2025    MCV 77 (L) 02/04/2025    MCH 25.0 (L) 02/04/2025    MCHC 32.7 02/04/2025    RDW 15.4 (H) 02/04/2025     02/04/2025     BMP:    Lab Results   Component Value Date     (LL) 02/04/2025    K 4.9 02/04/2025    CL 83 (L) 02/04/2025    CO2 24 02/04/2025    BUN 40 (H) 02/04/2025    CREATININE 1.33 (H) 02/04/2025    CALCIUM 7.2 (L) 02/04/2025    GLUCOSE 171 (H) 02/04/2025       Objective:  Vitals: /56   Pulse 66   Temp 36.4 °C (97.6 °F) (Temporal)   Resp 17   Ht 1.88 m (6' 2\")   Wt 107 kg (234 lb 12.6 oz)   SpO2 98%   BMI 30.15 kg/m²    Wt Readings from Last 3 Encounters:   02/04/25 107 kg (234 lb 12.6 oz)   11/12/24 97.5 kg (215 lb)   08/13/24 97.6 kg (215 lb 3.2 oz)      24HR INTAKE/OUTPUT:    Intake/Output Summary (Last 24 hours) at 2/4/2025 1047  Last data filed at 2/4/2025 0600  Gross per 24 hour   Intake 2122.5 ml   Output 1800 ml   Net 322.5 ml       General: alert, in no apparent distress  HEENT: normocephalic, atraumatic, anicteric  Lungs: non-labored respirations, rales   Heart: regular rate and rhythm  Abdomen: soft, non-tender, non-distended  Ext: +BLE peripheral edema  Neuro: alert, follows commands      Electronically signed by Nuria Meyers MD              "

## 2025-02-05 LAB
ANION GAP SERPL CALC-SCNC: 12 MMOL/L (ref 10–20)
ANION GAP SERPL CALC-SCNC: 13 MMOL/L (ref 10–20)
BACTERIA SPEC RESP CULT: NORMAL
BASOPHILS # BLD AUTO: 0.01 X10*3/UL (ref 0–0.1)
BASOPHILS NFR BLD AUTO: 0.1 %
BUN SERPL-MCNC: 33 MG/DL (ref 6–23)
BUN SERPL-MCNC: 34 MG/DL (ref 6–23)
CALCIUM SERPL-MCNC: 7.2 MG/DL (ref 8.6–10.3)
CALCIUM SERPL-MCNC: 7.5 MG/DL (ref 8.6–10.3)
CHLORIDE SERPL-SCNC: 88 MMOL/L (ref 98–107)
CHLORIDE SERPL-SCNC: 90 MMOL/L (ref 98–107)
CO2 SERPL-SCNC: 26 MMOL/L (ref 21–32)
CO2 SERPL-SCNC: 27 MMOL/L (ref 21–32)
CREAT SERPL-MCNC: 1.27 MG/DL (ref 0.5–1.3)
CREAT SERPL-MCNC: 1.3 MG/DL (ref 0.5–1.3)
EGFRCR SERPLBLD CKD-EPI 2021: 51 ML/MIN/1.73M*2
EGFRCR SERPLBLD CKD-EPI 2021: 52 ML/MIN/1.73M*2
EOSINOPHIL # BLD AUTO: 0.04 X10*3/UL (ref 0–0.4)
EOSINOPHIL NFR BLD AUTO: 0.3 %
ERYTHROCYTE [DISTWIDTH] IN BLOOD BY AUTOMATED COUNT: 15.4 % (ref 11.5–14.5)
GLUCOSE BLD MANUAL STRIP-MCNC: 129 MG/DL (ref 74–99)
GLUCOSE BLD MANUAL STRIP-MCNC: 131 MG/DL (ref 74–99)
GLUCOSE BLD MANUAL STRIP-MCNC: 147 MG/DL (ref 74–99)
GLUCOSE BLD MANUAL STRIP-MCNC: 154 MG/DL (ref 74–99)
GLUCOSE BLD MANUAL STRIP-MCNC: 162 MG/DL (ref 74–99)
GLUCOSE BLD MANUAL STRIP-MCNC: 177 MG/DL (ref 74–99)
GLUCOSE SERPL-MCNC: 123 MG/DL (ref 74–99)
GLUCOSE SERPL-MCNC: 145 MG/DL (ref 74–99)
GRAM STN SPEC: NORMAL
GRAM STN SPEC: NORMAL
HCT VFR BLD AUTO: 24.7 % (ref 41–52)
HGB BLD-MCNC: 8.1 G/DL (ref 13.5–17.5)
HOLD SPECIMEN: NORMAL
IMM GRANULOCYTES # BLD AUTO: 0.2 X10*3/UL (ref 0–0.5)
IMM GRANULOCYTES NFR BLD AUTO: 1.3 % (ref 0–0.9)
LYMPHOCYTES # BLD AUTO: 2.53 X10*3/UL (ref 0.8–3)
LYMPHOCYTES NFR BLD AUTO: 16.9 %
MAGNESIUM SERPL-MCNC: 2.44 MG/DL (ref 1.6–2.4)
MCH RBC QN AUTO: 25 PG (ref 26–34)
MCHC RBC AUTO-ENTMCNC: 32.8 G/DL (ref 32–36)
MCV RBC AUTO: 76 FL (ref 80–100)
MONOCYTES # BLD AUTO: 1.26 X10*3/UL (ref 0.05–0.8)
MONOCYTES NFR BLD AUTO: 8.4 %
NEUTROPHILS # BLD AUTO: 10.91 X10*3/UL (ref 1.6–5.5)
NEUTROPHILS NFR BLD AUTO: 73 %
NRBC BLD-RTO: 0 /100 WBCS (ref 0–0)
OSMOLALITY SERPL: 269 MOSM/KG (ref 280–300)
OSMOLALITY UR: 122 MOSM/KG (ref 200–1200)
PLATELET # BLD AUTO: 508 X10*3/UL (ref 150–450)
POTASSIUM SERPL-SCNC: 4.4 MMOL/L (ref 3.5–5.3)
POTASSIUM SERPL-SCNC: 4.5 MMOL/L (ref 3.5–5.3)
RBC # BLD AUTO: 3.24 X10*6/UL (ref 4.5–5.9)
SODIUM SERPL-SCNC: 122 MMOL/L (ref 136–145)
SODIUM SERPL-SCNC: 125 MMOL/L (ref 136–145)
WBC # BLD AUTO: 15 X10*3/UL (ref 4.4–11.3)

## 2025-02-05 PROCEDURE — 94640 AIRWAY INHALATION TREATMENT: CPT

## 2025-02-05 PROCEDURE — 83735 ASSAY OF MAGNESIUM: CPT | Performed by: STUDENT IN AN ORGANIZED HEALTH CARE EDUCATION/TRAINING PROGRAM

## 2025-02-05 PROCEDURE — 2500000001 HC RX 250 WO HCPCS SELF ADMINISTERED DRUGS (ALT 637 FOR MEDICARE OP)

## 2025-02-05 PROCEDURE — 2500000001 HC RX 250 WO HCPCS SELF ADMINISTERED DRUGS (ALT 637 FOR MEDICARE OP): Performed by: STUDENT IN AN ORGANIZED HEALTH CARE EDUCATION/TRAINING PROGRAM

## 2025-02-05 PROCEDURE — 2500000002 HC RX 250 W HCPCS SELF ADMINISTERED DRUGS (ALT 637 FOR MEDICARE OP, ALT 636 FOR OP/ED): Performed by: STUDENT IN AN ORGANIZED HEALTH CARE EDUCATION/TRAINING PROGRAM

## 2025-02-05 PROCEDURE — 2500000004 HC RX 250 GENERAL PHARMACY W/ HCPCS (ALT 636 FOR OP/ED): Performed by: STUDENT IN AN ORGANIZED HEALTH CARE EDUCATION/TRAINING PROGRAM

## 2025-02-05 PROCEDURE — 85025 COMPLETE CBC W/AUTO DIFF WBC: CPT | Performed by: STUDENT IN AN ORGANIZED HEALTH CARE EDUCATION/TRAINING PROGRAM

## 2025-02-05 PROCEDURE — 97530 THERAPEUTIC ACTIVITIES: CPT | Mod: GP,CQ

## 2025-02-05 PROCEDURE — 2500000004 HC RX 250 GENERAL PHARMACY W/ HCPCS (ALT 636 FOR OP/ED)

## 2025-02-05 PROCEDURE — 97535 SELF CARE MNGMENT TRAINING: CPT | Mod: GO,CO

## 2025-02-05 PROCEDURE — 80048 BASIC METABOLIC PNL TOTAL CA: CPT | Performed by: STUDENT IN AN ORGANIZED HEALTH CARE EDUCATION/TRAINING PROGRAM

## 2025-02-05 PROCEDURE — 36415 COLL VENOUS BLD VENIPUNCTURE: CPT | Performed by: STUDENT IN AN ORGANIZED HEALTH CARE EDUCATION/TRAINING PROGRAM

## 2025-02-05 PROCEDURE — 99232 SBSQ HOSP IP/OBS MODERATE 35: CPT | Performed by: NURSE PRACTITIONER

## 2025-02-05 PROCEDURE — 2500000005 HC RX 250 GENERAL PHARMACY W/O HCPCS: Performed by: STUDENT IN AN ORGANIZED HEALTH CARE EDUCATION/TRAINING PROGRAM

## 2025-02-05 PROCEDURE — 99232 SBSQ HOSP IP/OBS MODERATE 35: CPT

## 2025-02-05 PROCEDURE — 82947 ASSAY GLUCOSE BLOOD QUANT: CPT

## 2025-02-05 PROCEDURE — 1200000002 HC GENERAL ROOM WITH TELEMETRY DAILY

## 2025-02-05 PROCEDURE — 2500000002 HC RX 250 W HCPCS SELF ADMINISTERED DRUGS (ALT 637 FOR MEDICARE OP, ALT 636 FOR OP/ED)

## 2025-02-05 RX ORDER — QUETIAPINE FUMARATE 25 MG/1
25 TABLET, FILM COATED ORAL NIGHTLY PRN
Status: DISCONTINUED | OUTPATIENT
Start: 2025-02-05 | End: 2025-02-07 | Stop reason: HOSPADM

## 2025-02-05 RX ORDER — AMOXICILLIN AND CLAVULANATE POTASSIUM 875; 125 MG/1; MG/1
1 TABLET, FILM COATED ORAL EVERY 12 HOURS SCHEDULED
Status: DISCONTINUED | OUTPATIENT
Start: 2025-02-05 | End: 2025-02-07 | Stop reason: HOSPADM

## 2025-02-05 RX ADMIN — PIPERACILLIN SODIUM AND TAZOBACTAM SODIUM 3.38 G: 3; .375 INJECTION, SOLUTION INTRAVENOUS at 05:09

## 2025-02-05 RX ADMIN — Medication 5 MG: at 23:42

## 2025-02-05 RX ADMIN — METOPROLOL SUCCINATE 25 MG: 25 TABLET, EXTENDED RELEASE ORAL at 08:19

## 2025-02-05 RX ADMIN — TAMSULOSIN HYDROCHLORIDE 0.8 MG: 0.4 CAPSULE ORAL at 08:19

## 2025-02-05 RX ADMIN — QUETIAPINE FUMARATE 25 MG: 25 TABLET, FILM COATED ORAL at 20:14

## 2025-02-05 RX ADMIN — ISOSORBIDE MONONITRATE 60 MG: 60 TABLET, EXTENDED RELEASE ORAL at 08:19

## 2025-02-05 RX ADMIN — Medication 4 L/MIN: at 20:15

## 2025-02-05 RX ADMIN — APIXABAN 5 MG: 5 TABLET, FILM COATED ORAL at 08:19

## 2025-02-05 RX ADMIN — INSULIN LISPRO 1 UNITS: 100 INJECTION, SOLUTION INTRAVENOUS; SUBCUTANEOUS at 12:26

## 2025-02-05 RX ADMIN — Medication 4 L/MIN: at 08:31

## 2025-02-05 RX ADMIN — APIXABAN 5 MG: 5 TABLET, FILM COATED ORAL at 20:14

## 2025-02-05 RX ADMIN — ATORVASTATIN CALCIUM 40 MG: 20 TABLET, FILM COATED ORAL at 20:14

## 2025-02-05 RX ADMIN — IPRATROPIUM BROMIDE AND ALBUTEROL SULFATE 3 ML: .5; 3 SOLUTION RESPIRATORY (INHALATION) at 20:24

## 2025-02-05 RX ADMIN — IPRATROPIUM BROMIDE AND ALBUTEROL SULFATE 3 ML: .5; 3 SOLUTION RESPIRATORY (INHALATION) at 13:42

## 2025-02-05 RX ADMIN — GUAIFENESIN 600 MG: 600 TABLET, EXTENDED RELEASE ORAL at 08:19

## 2025-02-05 RX ADMIN — AMOXICILLIN AND CLAVULANATE POTASSIUM 1 TABLET: 875; 125 TABLET, FILM COATED ORAL at 20:14

## 2025-02-05 RX ADMIN — INSULIN LISPRO 1 UNITS: 100 INJECTION, SOLUTION INTRAVENOUS; SUBCUTANEOUS at 20:14

## 2025-02-05 RX ADMIN — IPRATROPIUM BROMIDE AND ALBUTEROL SULFATE 3 ML: .5; 3 SOLUTION RESPIRATORY (INHALATION) at 08:30

## 2025-02-05 RX ADMIN — Medication 4 L/MIN: at 13:43

## 2025-02-05 RX ADMIN — GUAIFENESIN 600 MG: 600 TABLET, EXTENDED RELEASE ORAL at 20:14

## 2025-02-05 RX ADMIN — STANDARDIZED SENNA CONCENTRATE 17.2 MG: 8.6 TABLET ORAL at 20:14

## 2025-02-05 ASSESSMENT — COGNITIVE AND FUNCTIONAL STATUS - GENERAL
DAILY ACTIVITIY SCORE: 15
MOVING TO AND FROM BED TO CHAIR: A LITTLE
WALKING IN HOSPITAL ROOM: A LITTLE
PERSONAL GROOMING: A LITTLE
DRESSING REGULAR LOWER BODY CLOTHING: A LOT
HELP NEEDED FOR BATHING: A LOT
TURNING FROM BACK TO SIDE WHILE IN FLAT BAD: A LOT
EATING MEALS: A LITTLE
MOBILITY SCORE: 13
TOILETING: A LOT
MOVING FROM LYING ON BACK TO SITTING ON SIDE OF FLAT BED WITH BEDRAILS: A LOT
DRESSING REGULAR UPPER BODY CLOTHING: A LITTLE
CLIMB 3 TO 5 STEPS WITH RAILING: TOTAL
STANDING UP FROM CHAIR USING ARMS: A LOT

## 2025-02-05 ASSESSMENT — PAIN SCALES - GENERAL
PAINLEVEL_OUTOF10: 0 - NO PAIN

## 2025-02-05 ASSESSMENT — PAIN - FUNCTIONAL ASSESSMENT
PAIN_FUNCTIONAL_ASSESSMENT: 0-10

## 2025-02-05 ASSESSMENT — ACTIVITIES OF DAILY LIVING (ADL): HOME_MANAGEMENT_TIME_ENTRY: 12

## 2025-02-05 NOTE — PROGRESS NOTES
Physical Therapy    Physical Therapy Treatment    Patient Name: Chris Russell  MRN: [id number]  Today's Date: 2/5/2025  Time Calculation  Start Time: 0857  Stop Time: 0921  Time Calculation (min): 24 min     MICU 09/09-A    Assessment/Plan   PT Assessment  PT Assessment Results: Decreased endurance, Impaired balance, Decreased mobility  Rehab Prognosis: Good  Barriers to Discharge Home: No anticipated barriers  Treatment Tolerance: Patient limited by fatigue  Medical Staff Made Aware: Yes  Strengths: Support of Caregivers, Living arrangement secure, Housing layout  Barriers to Participation: Comorbidities  End of Session Communication: Bedside nurse  Assessment Comment: Patient continues to require (A) for safety with bed mobility/transfers. Patient will benefit from additional PT to address deficits and improve mobility.  End of Session Patient Position: Up in chair, Alarm off, not on at start of session (Set up with breakfast; Call light, phone, and tray table within reach.)  PT Plan  Inpatient/Swing Bed or Outpatient: Inpatient  Treatment/Interventions: Bed mobility, Transfer training  PT Plan: Ongoing PT  PT Frequency: 3 times per week  PT Discharge Recommendations: Low intensity level of continued care    PT Recommended Transfer Status: Assist x2, ww    General Visit Information:   PT  Visit  PT Received On: 02/05/25  General  Family/Caregiver Present: No  Co-Treatment: OT  Co-Treatment Reason: Co-treat with OT to maximize patient and staff safety with bed mobility/transfers.  Prior to Session Communication: Bedside nurse  Patient Position Received: Bed, 3 rail up, Alarm off, not on at start of session  General Comment: Cleared by nursing to get patient into the recliner for breakfast. Patient agreeable to therapy. c/o urge to urinate, but not able to(vickers draining; nursing aware) and multiple episodes of diarrhea.    General Observations:   General Observation: Tele; 4.5L O2 via NC; Vickers catheter; IV;  Multiple monitor lines.    Subjective     Precautions:  Precautions  Hearing/Visual Limitations: [place], B/L hearing aides.  Medical Precautions: Fall precautions, Seizure precautions  Precautions Comment: Per EMR: High fall risk    Vital Signs:  Vital Signs  Heart Rate:  (73-86bpm during session.)  SpO2:  (4.5L O2 via NC. SPO2 96% at rest. SPO2 87-88% with activities. Mild JACOBS. ~1-2min recovery with seated rest break to improve SPO2 to 92-94%. Nursing aware of readings.)  BP:  (Supine BP: 124/67mmHg. Seated BP: 126/53mmHg. After transfer to chair BP: 130/59mmHg.)    Objective     Pain:  Pain Assessment  Pain Assessment: 0-10  0-10 (Numeric) Pain Score: 0 - No pain    Cognition:  Cognition  Overall Cognitive Status: Within Functional Limits (Increased time provided to give responses([place]).)  Processing Speed: Delayed    Balance:   Static Sitting Balance  Static Sitting-Comment/Number of Minutes: F+  Dynamic Sitting Balance  Dynamic Sitting-Comments: F  Static Standing Balance  Static Standing-Comment/Number of Minutes: F- with ww  Dynamic Standing Balance  Dynamic Standing-Comments: P+ with ww    Treatments:           Bed Mobility  Bed Mobility: Yes  Bed Mobility 1  Bed Mobility 1: Supine to sitting  Level of Assistance 1: Moderate assistance  Bed Mobility Comments 1: HOB ~40°. Hand over hand (A) to reach across body to use the bed rail for support. (A) to lift UB from HOB while moving LEs over the EOB. c/o dizziness with positional changes(vitals stable).  Ambulation/Gait Training  Ambulation/Gait Training Performed: Yes  Ambulation/Gait Training 1  Surface 1: Level tile  Device 1: Rolling walker  Assistance 1: Minimum assistance (x2)  Comments/Distance (ft) 1: ~3min standing(2x) and ~3ft bed to chair. WBOS. v/c for direction of movement. Slow with wt shifting. Forward flexed posture. Decreased step height/length B. v/c and (A) for safer maneuvering of ww with transfer to the chair.  Transfers  Transfer: Yes  Transfer  1  Technique 1: Sit to stand, Stand to sit  Transfer Device 1:  (ww)  Transfer Level of Assistance 1: Moderate assistance, +2  Trials/Comments 1: (3x). v/c for safe hand placement and technique; occasionally requiring hand over hand assist for moving hands to/from ww. Benefits from elevated surfaces. Retropulsive; v/c and (A) for shifting COG forward over YSABEL.             Outcome Measures:     [facility] Basic Mobility  Turning from your back to your side while in a flat bed without using bedrails: A lot  Moving from lying on your back to sitting on the side of a flat bed without using bedrails: A lot  Moving to and from bed to chair (including a wheelchair): A little  Standing up from a chair using your arms (e.g. wheelchair or bedside chair): A lot  To walk in hospital room: A little  Climbing 3-5 steps with railing: Total  Basic Mobility - Total Score: 13                                      Education Documentation  Mobility Training, taught by Brianne Alvarado PTA at 2/5/2025  1:41 PM.  Learner: Patient  Readiness: Acceptance  Method: Explanation, Demonstration, Teach-back  Response: Verbalizes Understanding, Needs Reinforcement  Comment: See therapy note.         EDUCATION:  Individual(s) Educated: Patient  Education Provided: Body Mechanics, Fall Risk, POC, Posture (Balance; Safety with bed mobility/transfers; Activity modification depending on fatigue/dizziness.)  Patient Response to Education: Patient/Caregiver Verbalized Understanding of Information (However, carry-over is limited.)    Encounter Problems       Encounter Problems (Active)       PT Problem       Pt. will transfer supine/sit with MOD I (Progressing)       Start:  01/28/25    Expected End:  02/11/25            Pt. will transfer sit/stand with safest and least restrictive assistive device with MOD I  (Progressing)       Start:  01/28/25    Expected End:  02/11/25            Pt.will ambulate 60'' with safest and least restrictive assistive device with  MOD I  (Not Progressing)       Start:  01/28/25    Expected End:  02/11/25            Pt. will amb up/down 2 steps with HR and cane with CGA  (Not Progressing)       Start:  01/28/25    Expected End:  02/11/25            Pt. will perform 2 x 15 B LE AROM exercises  (Not Progressing)       Start:  01/28/25    Expected End:  02/11/25

## 2025-02-05 NOTE — PROGRESS NOTES
"Chris Russell is a 89 y.o. male on day 9 of admission presenting with Community acquired pneumonia, unspecified laterality.    Subjective   Pt sleeping.     Objective     Physical Exam  GEN: sleeping, did not arouse to conversation    Last Recorded Vitals  Blood pressure 119/52, pulse 70, temperature 35.8 °C (96.4 °F), temperature source Temporal, resp. rate 11, height 1.88 m (6' 2\"), weight 102 kg (225 lb 1.4 oz), SpO2 98%.  Intake/Output last 3 Shifts:  I/O last 3 completed shifts:  In: 3233.3 (31.7 mL/kg) [P.O.:1260; I.V.:1773.3 (17.4 mL/kg); IV Piggyback:200]  Out: 6675 (65.4 mL/kg) [Urine:6675 (1.8 mL/kg/hr)]  Weight: 102.1 kg     Relevant Results     Scheduled medications  amoxicillin-pot clavulanate, 1 tablet, oral, q12h FREDDY  apixaban, 5 mg, oral, BID  atorvastatin, 40 mg, oral, Nightly  guaiFENesin, 600 mg, oral, BID  insulin lispro, 0-5 Units, subcutaneous, Before meals & nightly  ipratropium-albuteroL, 3 mL, nebulization, TID  isosorbide mononitrate ER, 60 mg, oral, Daily  metoprolol succinate XL, 25 mg, oral, Daily  oxygen, , inhalation, Continuous - Inhalation  polyethylene glycol, 17 g, oral, Daily  sennosides, 2 tablet, oral, Nightly  tamsulosin, 0.8 mg, oral, Daily  [Held by provider] valsartan, 40 mg, oral, Daily      Continuous medications     PRN medications  PRN medications: acetaminophen **OR** acetaminophen **OR** acetaminophen, albuterol, calcium carbonate, dextrose, dextrose, ipratropium-albuteroL, melatonin, nitroglycerin, ondansetron, QUEtiapine     Results for orders placed or performed during the hospital encounter of 01/27/25 (from the past 24 hours)   Basic metabolic panel   Result Value Ref Range    Glucose 145 (H) 74 - 99 mg/dL    Sodium 122 (L) 136 - 145 mmol/L    Potassium 4.5 3.5 - 5.3 mmol/L    Chloride 88 (L) 98 - 107 mmol/L    Bicarbonate 26 21 - 32 mmol/L    Anion Gap 13 10 - 20 mmol/L    Urea Nitrogen 34 (H) 6 - 23 mg/dL    Creatinine 1.27 0.50 - 1.30 mg/dL    eGFR 52 (L) >60 " mL/min/1.73m*2    Calcium 7.2 (L) 8.6 - 10.3 mg/dL   SST TOP   Result Value Ref Range    Extra Tube Hold for add-ons.    Magnesium   Result Value Ref Range    Magnesium 2.44 (H) 1.60 - 2.40 mg/dL   Basic Metabolic Panel   Result Value Ref Range    Glucose 123 (H) 74 - 99 mg/dL    Sodium 125 (L) 136 - 145 mmol/L    Potassium 4.4 3.5 - 5.3 mmol/L    Chloride 90 (L) 98 - 107 mmol/L    Bicarbonate 27 21 - 32 mmol/L    Anion Gap 12 10 - 20 mmol/L    Urea Nitrogen 33 (H) 6 - 23 mg/dL    Creatinine 1.30 0.50 - 1.30 mg/dL    eGFR 51 (L) >60 mL/min/1.73m*2    Calcium 7.5 (L) 8.6 - 10.3 mg/dL   CBC and Auto Differential   Result Value Ref Range    WBC 15.0 (H) 4.4 - 11.3 x10*3/uL    nRBC 0.0 0.0 - 0.0 /100 WBCs    RBC 3.24 (L) 4.50 - 5.90 x10*6/uL    Hemoglobin 8.1 (L) 13.5 - 17.5 g/dL    Hematocrit 24.7 (L) 41.0 - 52.0 %    MCV 76 (L) 80 - 100 fL    MCH 25.0 (L) 26.0 - 34.0 pg    MCHC 32.8 32.0 - 36.0 g/dL    RDW 15.4 (H) 11.5 - 14.5 %    Platelets 508 (H) 150 - 450 x10*3/uL    Neutrophils % 73.0 40.0 - 80.0 %    Immature Granulocytes %, Automated 1.3 (H) 0.0 - 0.9 %    Lymphocytes % 16.9 13.0 - 44.0 %    Monocytes % 8.4 2.0 - 10.0 %    Eosinophils % 0.3 0.0 - 6.0 %    Basophils % 0.1 0.0 - 2.0 %    Neutrophils Absolute 10.91 (H) 1.60 - 5.50 x10*3/uL    Immature Granulocytes Absolute, Automated 0.20 0.00 - 0.50 x10*3/uL    Lymphocytes Absolute 2.53 0.80 - 3.00 x10*3/uL    Monocytes Absolute 1.26 (H) 0.05 - 0.80 x10*3/uL    Eosinophils Absolute 0.04 0.00 - 0.40 x10*3/uL    Basophils Absolute 0.01 0.00 - 0.10 x10*3/uL   POCT GLUCOSE   Result Value Ref Range    POCT Glucose 131 (H) 74 - 99 mg/dL   POCT GLUCOSE   Result Value Ref Range    POCT Glucose 129 (H) 74 - 99 mg/dL   POCT GLUCOSE   Result Value Ref Range    POCT Glucose 177 (H) 74 - 99 mg/dL   POCT GLUCOSE   Result Value Ref Range    POCT Glucose 147 (H) 74 - 99 mg/dL   POCT GLUCOSE   Result Value Ref Range    POCT Glucose 162 (H) 74 - 99 mg/dL           Assessment/Plan    -Met with pt's family at bedside after review of TCC note.  Family is interested in a hospice consult.  Also considering short trial of SNF as he is home alone during the day.  Family will discuss amongst themselves if can get enough support to get patient home with hospice.  They would like to arrange a meeting with [facility].  Pt's spouse  in April and they were very pleased with their care/services.  SW notified of hospice consult   I spent 45 minutes in the professional and overall care of this patient.      Flex Meyer, APRN-CNP

## 2025-02-05 NOTE — PROGRESS NOTES
Nephrology Progress Note    Assessment:  89 y.o. yo male admitted with sob.  Found to have PNA and new CHF with ef 35% and pulm HTN.  Na 136 on admission but down to 118 when initial consult done.  Looks hypervolemic.  Sodium level was dropping even before he got lasix so cannot blame entirely on lasix.  On SSRI.  Urine sg 1.019 on admission.  NO hydronephrosis noted on renal ultrasound, has nml sized kidneys. Poor response to diuretics, tolvaptan and salt tabs. Only responded to 3% NS however due to c/f fluid overload put patient on NS at low rate. Has had significant improvement      Plan:  - would hold off of from further IVFs at this time due to fluid overload  - monitor UOP (diuresing well due to correction of Na), no diuretics for now  - will give lasix/tolvaptan PRN  - keep valsartan on hold as well  - ok to transfer to [facility]      Outpatient follow up from renal standpoint: TBD       Subjective:  Admit Date: 1/27/2025    Interval History: na now up to 125 this AM, has been off of IVFs since yesterday, ? Low BP this AM     Medications:  Scheduled Meds:apixaban, 5 mg, oral, BID  atorvastatin, 40 mg, oral, Nightly  guaiFENesin, 600 mg, oral, BID  insulin lispro, 0-5 Units, subcutaneous, Before meals & nightly  ipratropium-albuteroL, 3 mL, nebulization, TID  isosorbide mononitrate ER, 60 mg, oral, Daily  metoprolol succinate XL, 25 mg, oral, Daily  oxygen, , inhalation, Continuous - Inhalation  piperacillin-tazobactam, 3.375 g, intravenous, q8h  polyethylene glycol, 17 g, oral, Daily  QUEtiapine, 25 mg, oral, Nightly  sennosides, 2 tablet, oral, Nightly  tamsulosin, 0.8 mg, oral, Daily  [Held by provider] valsartan, 40 mg, oral, Daily      Continuous Infusions:       CBC:   Lab Results   Component Value Date    WBC 15.0 (H) 02/05/2025    RBC 3.24 (L) 02/05/2025    HGB 8.1 (L) 02/05/2025    HCT 24.7 (L) 02/05/2025    MCV 76 (L) 02/05/2025    MCH 25.0 (L) 02/05/2025    MCHC 32.8 02/05/2025    RDW 15.4 (H) 02/05/2025  "    (H) 02/05/2025     BMP:    Lab Results   Component Value Date     (L) 02/05/2025    K 4.4 02/05/2025    CL 90 (L) 02/05/2025    CO2 27 02/05/2025    BUN 33 (H) 02/05/2025    CREATININE 1.30 02/05/2025    CALCIUM 7.5 (L) 02/05/2025    GLUCOSE 123 (H) 02/05/2025       Objective:  Vitals: BP (!) 82/47   Pulse 76   Temp 36.2 °C (97.2 °F) (Temporal)   Resp 13   Ht 1.88 m (6' 2\")   Wt 102 kg (225 lb 1.4 oz)   SpO2 92%   BMI 28.90 kg/m²    Wt Readings from Last 3 Encounters:   02/05/25 102 kg (225 lb 1.4 oz)   11/12/24 97.5 kg (215 lb)   08/13/24 97.6 kg (215 lb 3.2 oz)      24HR INTAKE/OUTPUT:    Intake/Output Summary (Last 24 hours) at 2/5/2025 1031  Last data filed at 2/5/2025 0600  Gross per 24 hour   Intake 1761.58 ml   Output 4975 ml   Net -3213.42 ml       General: alert, in no apparent distress  HEENT: normocephalic, atraumatic, anicteric  Lungs: non-labored respirations, rales   Heart: regular rate and rhythm  Abdomen: soft, non-tender, non-distended  Ext: +BLE peripheral edema  Neuro: alert, follows commands      Electronically signed by Nuria Meyers MD              "

## 2025-02-05 NOTE — CARE PLAN
The clinical goals for the shift include Patient will remain free of falls throughout shift

## 2025-02-05 NOTE — PROGRESS NOTES
Snf list provided, family is still uncertain as to plan. Daughter will be contact. Awaiting PT /OT  input at time of conversation. Daughter advised patient was independent until recently and was now requires some additional personal care assistance just prior to admit. Inquires were made regarding Palliative Care vs Hospice as well if discharge team can assist in providing list of area home care services or agencies for aids. Daughter works but has been primary contact for patient. Agreed to await PT>OT updates.

## 2025-02-05 NOTE — CARE PLAN
"The patient's goals for the shift include \"To get some sleep\"     The clinical goals for the shift include Patient will remain free of falls throughout shift    Problem: Safety - Adult  Goal: Free from fall injury  Flowsheets (Taken 2/5/2025 0033)  Free from fall injury: Instruct family/caregiver on patient safety     Problem: Discharge Planning  Goal: Discharge to home or other facility with appropriate resources  Flowsheets (Taken 2/5/2025 0033)  Discharge to home or other facility with appropriate resources: Identify barriers to discharge with patient and caregiver     Problem: Chronic Conditions and Co-morbidities  Goal: Patient's chronic conditions and co-morbidity symptoms are monitored and maintained or improved  Flowsheets (Taken 2/5/2025 0033)  Care Plan - Patient's Chronic Conditions and Co-Morbidity Symptoms are Monitored and Maintained or Improved: Monitor and assess patient's chronic conditions and comorbid symptoms for stability, deterioration, or improvement     Problem: Diabetes  Goal: Achieve decreasing blood glucose levels by end of shift  Flowsheets (Taken 2/5/2025 0033)  Achieve decreasing blood glucose levels by end of shift: Med administration/monitoring of effect  Goal: Increase stability of blood glucose readings by end of shift  Flowsheets (Taken 2/5/2025 0033)  Increase stability of blood glucose readings by end of shift: Med administration/monitoring of effect  Goal: Decrease in ketones present in urine by end of shift  Flowsheets (Taken 2/5/2025 0033)  Decrease in ketones present in urine by end of shift: Med administration/monitoring of effect     Problem: Skin  Goal: Decreased wound size/increased tissue granulation at next dressing change  Flowsheets (Taken 2/5/2025 0033)  Decreased wound size/increased tissue granulation at next dressing change: Protective dressings over bony prominences  Goal: Participates in plan/prevention/treatment measures  Flowsheets (Taken 2/5/2025 " 0033)  Participates in plan/prevention/treatment measures:   Elevate heels   Discuss with provider PT/OT consult   Increase activity/out of bed for meals  Goal: Prevent/manage excess moisture  Flowsheets (Taken 2/5/2025 0033)  Prevent/manage excess moisture:   Cleanse incontinence/protect with barrier cream   Moisturize dry skin  Goal: Prevent/minimize sheer/friction injuries  Flowsheets (Taken 2/5/2025 0033)  Prevent/minimize sheer/friction injuries:   Use pull sheet   Increase activity/out of bed for meals   Turn/reposition every 2 hours/use positioning/transfer devices  Goal: Promote/optimize nutrition  Flowsheets (Taken 2/5/2025 0033)  Promote/optimize nutrition:   Offer water/supplements/favorite foods   Consume > 50% meals/supplements   Monitor/record intake including meals  Goal: Promote skin healing  Flowsheets (Taken 2/5/2025 0033)  Promote skin healing:   Turn/reposition every 2 hours/use positioning/transfer devices   Assess skin/pad under line(s)/device(s)   Protective dressings over bony prominences

## 2025-02-05 NOTE — PROGRESS NOTES
[facility] Critical Care Medicine       Date:  2/5/2025  Patient:  Chris Russell  YOB: 1930  MRN:  [id number]   Admit Date:  1/27/2025  ========================================================================================================    Chief Complaint   Patient presents with    Altered Mental Status     Pt was at home and daughter states about 3-4 hours ago he became disoriented and not making sense with his words. Daughter states he was having a hard time getting words out. Pt daughter states this episode lasted around 5 minutes and he went back to normal. Pt daughter also states that he has had a cough for 3 days which is affecting his breathing.          History of Present Illness:  Chris Russell is a 89 y.o. year old male patient with Past Medical History of PE on Eliquis, DM, HLD, BPH who presented with complaints of difficulty breathing and confusion.  Patient informed staff in the emergency department that he was not feeling well over period of several days prior to presentation.  He does have cough with chest congestion.  Patient's daughter noted that the patient appeared confused to her which is what prompted evaluation in the emergency department.  Imaging suggesting pneumonia.  Patient was started on empiric antibiotic coverage with ceftriaxone/azithromycin.  From a respiratory standpoint his condition was making slow steady progress.  However, the patient began having a precipitous drop in his sodium level.  Initial value was 136 and the most recent value this morning at 112.  Patient has received tolvaptan without significant response.  I was notified this morning that they would like to initiate hypertonic saline and will need transfer to ICU.     When I came to assess the patient he was looking comfortable on nasal cannula.  He did had a fairly extensive edema on his lower extremities.  His appetite is good and he had just finished his breakfast when I entered the room.   Currently afebrile and hemodynamically stable.  Review of the patient's orders show that he did not have a volume restriction but did have a salt restriction.         Interval ICU Events:  2/2: -transfer to ICU for hypertonic saline.  Otherwise hemodynamically stable and saturating well on nasal cannula.     2/3: Started on hypertonic saline overnight for 2 hours.  Repeat sodium level is 117.  Had some respiratory distress overnight with chest x-ray showing continued pneumonia.  Given 40 of Lasix with 800 of output in 3 hours.  White blood cell count is up to 18 today.  Received 3 days of Rocephin and 2 days of Zithromax.  Down to 4 L nasal cannula this morning.  Still states he is short of breath with congested and wet lung sounds.  Also had some confusion overnight.  Became combative and was placed in restraints.    2/4: Patient with agitation overnight requiring 25 mg Seroquel with improvement.  This morning patient is awake, alert, oriented x 3.  Had multiple episodes of urinary retention >500, will place Short catheter today.  Renal function slightly improved with IVF, creatinine 1.25 today.  Sodium remains low at 115.  WBC 17.5.  MRSA PCR nares negative, de-escalate antibiotics to Zosyn alone.    2/5: No significant changes overnight.  This morning patient awake, alert, orient x 3.  No complaints.  Will remove Short catheter and attempt void trial today.  Sodium improved to 125.  Renal function stable.  Can transfer to general medicine.    Medical History:  Past Medical History:   Diagnosis Date    Coronary artery disease      Past Surgical History:   Procedure Laterality Date    ABDOMINAL AORTIC ANEURYSM REPAIR      CHOLECYSTECTOMY       Medications Prior to Admission   Medication Sig Dispense Refill Last Dose/Taking    albuterol (Ventolin HFA) 90 mcg/actuation inhaler Inhale 2 puffs every 4 hours if needed for wheezing or shortness of breath. 18 g 2 1/27/2025    atorvastatin (Lipitor) 40 mg tablet TAKE 1  TABLET BY MOUTH EVERY DAY 90 tablet 3 1/26/2025    Eliquis 5 mg tablet Take 1 tablet (5 mg) by mouth 2 times a day. 60 tablet 3 1/27/2025    multivitamin capsule Take 1 capsule by mouth once daily.   1/27/2025    tamsulosin (Flomax) 0.4 mg 24 hr capsule TAKE 2 CAPSULES BY MOUTH ONCE DAILY 180 capsule 1 1/26/2025    apixaban (Eliquis DVT-PE Treat 30D Start) 5 mg (74 tabs) tablets,dose pack Take 2 tablets (10 mg) by mouth every 12 hours.       benzocaine-menthoL (Cepacol Sore Throat, lyndsey-men,) 15-2.3 mg lozenge Place into mouth between cheek and gum.   Unknown    traZODone (Desyrel) 50 mg tablet TAKE 1 TABLET (50 MG) BY MOUTH ONCE DAILY AT BEDTIME. 90 tablet 0      Patient has no known allergies.  Social History     Tobacco Use    Smoking status: Never     Passive exposure: Never    Smokeless tobacco: Never   Substance Use Topics    Alcohol use: Never    Drug use: Never     Family History   Problem Relation Name Age of Onset    No Known Problems Mother      No Known Problems Father         Review of Systems:  14 point review of systems was completed and negative except for those specially mention in my HPI    Physical Exam:    Heart Rate:  [66-84]   Temp:  [36.2 °C (97.2 °F)-36.4 °C (97.6 °F)]   Resp:  [11-24]   BP: (110-146)/(53-76)   Weight:  [102 kg (225 lb 1.4 oz)]   SpO2:  [91 %-99 %]     Physical Exam  Vitals reviewed.   Constitutional:       Appearance: Normal appearance.   HENT:      Head: Normocephalic.      Nose: Nose normal.      Mouth/Throat:      Mouth: Mucous membranes are moist.      Pharynx: Oropharynx is clear.   Eyes:      Extraocular Movements: Extraocular movements intact.      Pupils: Pupils are equal, round, and reactive to light.   Cardiovascular:      Pulses: Normal pulses.      Heart sounds: Normal heart sounds.      Comments: Sinus tachycardia  Pulmonary:      Comments: Coarse crackles with rhonchi throughout, congested and productive cough, no wheezing, mild tachypnea  Abdominal:       General: Abdomen is flat.      Palpations: Abdomen is soft.   Musculoskeletal:         General: Normal range of motion.      Cervical back: Normal range of motion and neck supple.   Skin:     General: Skin is warm and dry.      Capillary Refill: Capillary refill takes less than 2 seconds.   Neurological:      General: No focal deficit present.      Mental Status: He is alert.      Comments: Disoriented to place and time, oriented to self, awakens easily and answers questions appropriately         Objective:    I have reviewed all medications, laboratory results, and imaging pertinent for today's encounter    Assessment/Plan:    I am currently managing this critically ill patient for the following problems:    Neuro/Psych/Pain Ctrl/Sedation:  #Hospital-acquired delirium, improving  -- Neurochecks, CAM assessment, delirium precautions  -- DC trazodone and tramadol as can contribute to hyponatremia  -- Continue Seroquel 25 mg nightly as needed    Respiratory/ENT:  #Acute hypoxic respiratory failure, improving  #Hospital Acquired Pneumonia  #Every day smoker, likely undiagnosed COPD  :CXR 2/3 with LLL infiltrate  -- Abx as per ID section  -- Inhaled bronchodilators  -- Pulmonary hygiene  -- On 4L nasal cannula, wean supplemental O2 for SpO2 goal >92%    Cardiovascular:  #Acute on chronic systolic heart failure, last known EF 35%  #Pulmonary HTN (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)  #HTN, HLD  :Echo 1/29/25 with LVEF 35%, RVSP 75.2  -- Continue home statin  -- Continue metoprolol, Imdur  -- Holding valsartan and diuretics in the setting of hyponatremia    #History of Pulmonary Embolism  -- Continue home Eliquis    GI:  No acute issues  -- Underwent MBS --> cleared for diet, thin liquids    Renal/Volume Status (Intra & Extravascular):  #Hyponatremia   #Hyperkalemia  Unclear cause of hyponatremia  -- Sodium improving with NS administration and Tolvaptan  -- Nephrology following and managing    #Urinary retention  -- Continue home  Flomax  -- Short catheter placed 2/4 -> will void trial today    Endocrine  #DM 2  -- Lispro #1 SSI ACHS    Infectious Disease:  #Hospital Acquired Pneumonia  -- MRSA nares negative  -- De-escalate abx to Augmentin (end 2/8)    Heme/Onc:  #History of pulmonary embolism on Eliquis  -- Continue Eliquis  -- Daily CBC    OBGYN/MSK:  #Generalized deconditioning  -- PT OT    Ethics/Code Status:  DNR/DNI    :  DVT Prophylaxis: Eliquis  GI Prophylaxis: None  Bowel Regimen: As needed  Diet: Regular  CVC: None  Ligia: None  Short: Yes (2/4)  Restraints: Off  Dispo: General medicine    Sukhjinder Vera, APRN-CNP  Pulmonary & Critical Care Medicine  [facility]

## 2025-02-05 NOTE — PROGRESS NOTES
Occupational Therapy    OT Treatment    Patient Name: Chris Russell  MRN: [id number]  Department: [facility]  Room: 09/09-A  Today's Date: 2/5/2025  Time Calculation  Start Time: 0858  Stop Time: 0922  Time Calculation (min): 24 min        Assessment:  End of Session Communication: Bedside nurse  End of Session Patient Position: Up in chair, Alarm off, not on at start of session (all needs met,call light within reach.  Assisted with breakfast s/u)     Plan:  Treatment Interventions: ADL retraining, Functional transfer training, Endurance training  OT Frequency: 2 times per week  OT Discharge Recommendations: Low intensity level of continued care  OT - OK to Discharge: Yes (Once medically appropriate.)  Treatment Interventions: ADL retraining, Functional transfer training, Endurance training    Subjective   Previous Visit Info:  OT Last Visit  OT Received On: 02/05/25  General:  General  Past Medical History Relevant to Rehab: includes: AAA repair, PE, HLD, CAD  Co-Treatment: PT  Co-Treatment Reason: Co-treat with PT to maximize patient and staff safety with transfers/amb.  Prior to Session Communication: Bedside nurse  Patient Position Received: Bed, 3 rail up, Alarm off, not on at start of session  General Comment: Cleared by nursing to get patient into the recliner for breakfast. Patient agreeable to therapy. c/o urge to urinate, but not able to(vickers draining; nursing aware) and multiple episodes of diarrhea.  Precautions:  Hearing/Visual Limitations: [place], B/L hearing aides.  Medical Precautions: Fall precautions, Seizure precautions        Vital Signs Comment: BP: 124/67 at start of OT tx, 126/53 sitting, 130/59 after transfer.  HR 73-86. SpO2 87-88% with activity on continuous O2 and 94-96% at rest.     Pain:  Pain Assessment  Pain Assessment: 0-10  0-10 (Numeric) Pain Score: 0 - No pain    Objective    Cognition:  Cognition  Overall Cognitive Status: Within Functional Limits  Processing Speed:  Delayed    Functional Standing Tolerance:  Functional Standing Tolerance Comments: x 3 min x 2 trials with static/dyn standing trials  Bed Mobility/Transfers: Bed Mobility 1  Bed Mobility 1: Supine to sitting  Level of Assistance 1: Moderate assistance  Bed Mobility Comments 1: HOB ~40°. Hand over hand (A) to reach across body to use the bed rail for support. (A) to lift UB from HOB while moving LEs over the EOB. c/o dizziness with positional changes(vitals stable).    Transfer 1  Technique 1: Sit to stand, Stand to sit  Transfer Device 1:  (fww)  Transfer Level of Assistance 1: Moderate assistance, +2  Trials/Comments 1: (3x). v/c for safe hand placement and technique; occasionally requiring hand over hand assist for moving hands to/from ww. Benefits from elevated surfaces. Retropulsive; v/c and (A) for shifting COG forward over YSABEL.  Transfers 2  Trials/Comments 2: bed to chair transfer with min assist x 2    Sitting Balance:  Static Sitting Balance  Static Sitting-Comment/Number of Minutes: F+  Dynamic Sitting Balance  Dynamic Sitting-Comments: F  Standing Balance:  Static Standing Balance  Static Standing-Comment/Number of Minutes: F-  Dynamic Standing Balance  Dynamic Standing-Comments: P+      Outcome Measures:[facility] Daily Activity  Putting on and taking off regular lower body clothing: A lot  Bathing (including washing, rinsing, drying): A lot  Putting on and taking off regular upper body clothing: A little  Toileting, which includes using toilet, bedpan or urinal: A lot  Taking care of personal grooming such as brushing teeth: A little  Eating Meals: A little  Daily Activity - Total Score: 15        Education Documentation  Body Mechanics, taught by Jennifer L Felty, OTA at 2/5/2025  2:53 PM.  Learner: Patient  Readiness: Acceptance  Method: Explanation, Demonstration  Response: Needs Reinforcement    EDUCATION: Educated on safety and fall prevention with completion of adl's and transfers.         Goals:  Encounter Problems       Encounter Problems (Active)       OT Goals       Patient will complete household distance functional mobility at a mod I level.  (Progressing)       Start:  01/28/25    Expected End:  02/11/25            Patient will complete functional transfers at a mod I level.  (Progressing)       Start:  01/28/25    Expected End:  02/11/25            Patient will demonstrate fair + dynamic standing balance during functional tasks. (Progressing)       Start:  01/28/25    Expected End:  02/11/25            Patient will complete toileting at a mod I level.  (Progressing)       Start:  01/28/25    Expected End:  02/11/25            Patient will tolerate standing greater than 3 minutes during functional tasks.  (Progressing)       Start:  01/28/25    Expected End:  02/11/25

## 2025-02-06 LAB
ANION GAP SERPL CALC-SCNC: 13 MMOL/L (ref 10–20)
BASOPHILS # BLD AUTO: 0.02 X10*3/UL (ref 0–0.1)
BASOPHILS NFR BLD AUTO: 0.1 %
BUN SERPL-MCNC: 33 MG/DL (ref 6–23)
CALCIUM SERPL-MCNC: 7.8 MG/DL (ref 8.6–10.3)
CHLORIDE SERPL-SCNC: 96 MMOL/L (ref 98–107)
CO2 SERPL-SCNC: 27 MMOL/L (ref 21–32)
CREAT SERPL-MCNC: 1.32 MG/DL (ref 0.5–1.3)
EGFRCR SERPLBLD CKD-EPI 2021: 50 ML/MIN/1.73M*2
EOSINOPHIL # BLD AUTO: 0.08 X10*3/UL (ref 0–0.4)
EOSINOPHIL NFR BLD AUTO: 0.6 %
ERYTHROCYTE [DISTWIDTH] IN BLOOD BY AUTOMATED COUNT: 15.7 % (ref 11.5–14.5)
GLUCOSE BLD MANUAL STRIP-MCNC: 131 MG/DL (ref 74–99)
GLUCOSE BLD MANUAL STRIP-MCNC: 144 MG/DL (ref 74–99)
GLUCOSE BLD MANUAL STRIP-MCNC: 173 MG/DL (ref 74–99)
GLUCOSE BLD MANUAL STRIP-MCNC: 177 MG/DL (ref 74–99)
GLUCOSE SERPL-MCNC: 130 MG/DL (ref 74–99)
HCT VFR BLD AUTO: 25.3 % (ref 41–52)
HGB BLD-MCNC: 8.2 G/DL (ref 13.5–17.5)
IMM GRANULOCYTES # BLD AUTO: 0.14 X10*3/UL (ref 0–0.5)
IMM GRANULOCYTES NFR BLD AUTO: 1 % (ref 0–0.9)
LYMPHOCYTES # BLD AUTO: 2.63 X10*3/UL (ref 0.8–3)
LYMPHOCYTES NFR BLD AUTO: 19.5 %
MAGNESIUM SERPL-MCNC: 2.64 MG/DL (ref 1.6–2.4)
MCH RBC QN AUTO: 24.9 PG (ref 26–34)
MCHC RBC AUTO-ENTMCNC: 32.4 G/DL (ref 32–36)
MCV RBC AUTO: 77 FL (ref 80–100)
MONOCYTES # BLD AUTO: 1.17 X10*3/UL (ref 0.05–0.8)
MONOCYTES NFR BLD AUTO: 8.7 %
NEUTROPHILS # BLD AUTO: 9.43 X10*3/UL (ref 1.6–5.5)
NEUTROPHILS NFR BLD AUTO: 70.1 %
NRBC BLD-RTO: 0 /100 WBCS (ref 0–0)
PLATELET # BLD AUTO: 552 X10*3/UL (ref 150–450)
POTASSIUM SERPL-SCNC: 4.1 MMOL/L (ref 3.5–5.3)
RBC # BLD AUTO: 3.29 X10*6/UL (ref 4.5–5.9)
SODIUM SERPL-SCNC: 132 MMOL/L (ref 136–145)
WBC # BLD AUTO: 13.5 X10*3/UL (ref 4.4–11.3)

## 2025-02-06 PROCEDURE — 2500000001 HC RX 250 WO HCPCS SELF ADMINISTERED DRUGS (ALT 637 FOR MEDICARE OP)

## 2025-02-06 PROCEDURE — 2500000002 HC RX 250 W HCPCS SELF ADMINISTERED DRUGS (ALT 637 FOR MEDICARE OP, ALT 636 FOR OP/ED)

## 2025-02-06 PROCEDURE — 80048 BASIC METABOLIC PNL TOTAL CA: CPT

## 2025-02-06 PROCEDURE — 82947 ASSAY GLUCOSE BLOOD QUANT: CPT

## 2025-02-06 PROCEDURE — 2500000005 HC RX 250 GENERAL PHARMACY W/O HCPCS: Performed by: INTERNAL MEDICINE

## 2025-02-06 PROCEDURE — 2500000001 HC RX 250 WO HCPCS SELF ADMINISTERED DRUGS (ALT 637 FOR MEDICARE OP): Performed by: INTERNAL MEDICINE

## 2025-02-06 PROCEDURE — 99233 SBSQ HOSP IP/OBS HIGH 50: CPT | Performed by: INTERNAL MEDICINE

## 2025-02-06 PROCEDURE — 94640 AIRWAY INHALATION TREATMENT: CPT

## 2025-02-06 PROCEDURE — 85025 COMPLETE CBC W/AUTO DIFF WBC: CPT

## 2025-02-06 PROCEDURE — 2500000005 HC RX 250 GENERAL PHARMACY W/O HCPCS

## 2025-02-06 PROCEDURE — 36415 COLL VENOUS BLD VENIPUNCTURE: CPT

## 2025-02-06 PROCEDURE — 83735 ASSAY OF MAGNESIUM: CPT

## 2025-02-06 PROCEDURE — 1100000001 HC PRIVATE ROOM DAILY

## 2025-02-06 PROCEDURE — 94760 N-INVAS EAR/PLS OXIMETRY 1: CPT

## 2025-02-06 PROCEDURE — 2500000004 HC RX 250 GENERAL PHARMACY W/ HCPCS (ALT 636 FOR OP/ED)

## 2025-02-06 RX ORDER — TORSEMIDE 20 MG/1
20 TABLET ORAL ONCE
Status: COMPLETED | OUTPATIENT
Start: 2025-02-06 | End: 2025-02-06

## 2025-02-06 RX ADMIN — TAMSULOSIN HYDROCHLORIDE 0.8 MG: 0.4 CAPSULE ORAL at 08:23

## 2025-02-06 RX ADMIN — AMOXICILLIN AND CLAVULANATE POTASSIUM 1 TABLET: 875; 125 TABLET, FILM COATED ORAL at 08:23

## 2025-02-06 RX ADMIN — GUAIFENESIN 600 MG: 600 TABLET, EXTENDED RELEASE ORAL at 08:23

## 2025-02-06 RX ADMIN — TORSEMIDE 20 MG: 20 TABLET ORAL at 12:53

## 2025-02-06 RX ADMIN — IPRATROPIUM BROMIDE AND ALBUTEROL SULFATE 3 ML: .5; 3 SOLUTION RESPIRATORY (INHALATION) at 19:50

## 2025-02-06 RX ADMIN — APIXABAN 5 MG: 5 TABLET, FILM COATED ORAL at 08:23

## 2025-02-06 RX ADMIN — Medication 3 L/MIN: at 07:36

## 2025-02-06 RX ADMIN — ISOSORBIDE MONONITRATE 60 MG: 60 TABLET, EXTENDED RELEASE ORAL at 08:23

## 2025-02-06 RX ADMIN — GUAIFENESIN 600 MG: 600 TABLET, EXTENDED RELEASE ORAL at 20:20

## 2025-02-06 RX ADMIN — APIXABAN 5 MG: 5 TABLET, FILM COATED ORAL at 20:21

## 2025-02-06 RX ADMIN — Medication 5 MG: at 20:55

## 2025-02-06 RX ADMIN — INSULIN LISPRO 1 UNITS: 100 INJECTION, SOLUTION INTRAVENOUS; SUBCUTANEOUS at 11:37

## 2025-02-06 RX ADMIN — AMOXICILLIN AND CLAVULANATE POTASSIUM 1 TABLET: 875; 125 TABLET, FILM COATED ORAL at 20:21

## 2025-02-06 RX ADMIN — IPRATROPIUM BROMIDE AND ALBUTEROL SULFATE 3 ML: .5; 3 SOLUTION RESPIRATORY (INHALATION) at 07:36

## 2025-02-06 RX ADMIN — ATORVASTATIN CALCIUM 40 MG: 20 TABLET, FILM COATED ORAL at 20:21

## 2025-02-06 RX ADMIN — POLYETHYLENE GLYCOL 3350 17 G: 17 POWDER, FOR SOLUTION ORAL at 08:24

## 2025-02-06 RX ADMIN — METOPROLOL SUCCINATE 25 MG: 25 TABLET, EXTENDED RELEASE ORAL at 08:23

## 2025-02-06 RX ADMIN — Medication 2 L/MIN: at 12:11

## 2025-02-06 RX ADMIN — INSULIN LISPRO 1 UNITS: 100 INJECTION, SOLUTION INTRAVENOUS; SUBCUTANEOUS at 20:44

## 2025-02-06 ASSESSMENT — COGNITIVE AND FUNCTIONAL STATUS - GENERAL
DRESSING REGULAR UPPER BODY CLOTHING: A LOT
MOVING TO AND FROM BED TO CHAIR: A LOT
MOBILITY SCORE: 11
WALKING IN HOSPITAL ROOM: TOTAL
TOILETING: TOTAL
DAILY ACTIVITIY SCORE: 11
TURNING FROM BACK TO SIDE WHILE IN FLAT BAD: A LOT
MOBILITY SCORE: 11
MOVING TO AND FROM BED TO CHAIR: A LOT
WALKING IN HOSPITAL ROOM: TOTAL
STANDING UP FROM CHAIR USING ARMS: A LOT
TURNING FROM BACK TO SIDE WHILE IN FLAT BAD: A LOT
DRESSING REGULAR LOWER BODY CLOTHING: TOTAL
CLIMB 3 TO 5 STEPS WITH RAILING: TOTAL
EATING MEALS: A LITTLE
HELP NEEDED FOR BATHING: TOTAL
EATING MEALS: A LITTLE
TOILETING: TOTAL
HELP NEEDED FOR BATHING: TOTAL
PERSONAL GROOMING: A LITTLE
DAILY ACTIVITIY SCORE: 11
DRESSING REGULAR LOWER BODY CLOTHING: TOTAL
STANDING UP FROM CHAIR USING ARMS: A LOT
CLIMB 3 TO 5 STEPS WITH RAILING: TOTAL
MOVING FROM LYING ON BACK TO SITTING ON SIDE OF FLAT BED WITH BEDRAILS: A LITTLE
DRESSING REGULAR UPPER BODY CLOTHING: A LOT
MOVING FROM LYING ON BACK TO SITTING ON SIDE OF FLAT BED WITH BEDRAILS: A LITTLE
PERSONAL GROOMING: A LITTLE

## 2025-02-06 ASSESSMENT — PAIN - FUNCTIONAL ASSESSMENT
PAIN_FUNCTIONAL_ASSESSMENT: 0-10
PAIN_FUNCTIONAL_ASSESSMENT: 0-10

## 2025-02-06 ASSESSMENT — PAIN SCALES - GENERAL
PAINLEVEL_OUTOF10: 0 - NO PAIN

## 2025-02-06 NOTE — NURSING NOTE
RRN nursing note 12 hour follow up post icu transfer. VSS, pt on 2lNC. Pt has son at bedside . Asking to get out of bed and in chair. Let bedside RN know, got chair ready for RN and patient to get out of bed. Bedside rn said that dr is aware patients lungs sounds are crackles, no diuretic order d/t hyponatremia per bedside rn. No other concerns from bedside rn. Will continue to follow.

## 2025-02-06 NOTE — CARE PLAN
The patient's goals for the shift include to be able to get home    The clinical goals for the shift include remain HDS    Over the shift, the patient did  Problem: Safety - Adult  Goal: Free from fall injury  Outcome: Progressing     Problem: Discharge Planning  Goal: Discharge to home or other facility with appropriate resources  Outcome: Progressing     Problem: Chronic Conditions and Co-morbidities  Goal: Patient's chronic conditions and co-morbidity symptoms are monitored and maintained or improved  Outcome: Progressing     Problem: Nutrition  Goal: Nutrient intake appropriate for maintaining nutritional needs  Outcome: Progressing     Problem: Diabetes  Goal: Achieve decreasing blood glucose levels by end of shift  Outcome: Progressing  Goal: Increase stability of blood glucose readings by end of shift  Outcome: Progressing  Goal: Decrease in ketones present in urine by end of shift  Outcome: Progressing  Goal: Maintain electrolyte levels within acceptable range throughout shift  Outcome: Progressing  Goal: Maintain glucose levels >70mg/dl to <250mg/dl throughout shift  Outcome: Progressing  Goal: No changes in neurological exam by end of shift  Outcome: Progressing  Goal: Learn about and adhere to nutrition recommendations by end of shift  Outcome: Progressing  Goal: Vital signs within normal range for age by end of shift  Outcome: Progressing  Goal: Increase self care and/or family involovement by end of shift  Outcome: Progressing  Goal: Receive DSME education by end of shift  Outcome: Progressing     Problem: Fall/Injury  Goal: Not fall by end of shift  Outcome: Progressing  Goal: Be free from injury by end of the shift  Outcome: Progressing  Goal: Verbalize understanding of personal risk factors for fall in the hospital  Outcome: Progressing  Goal: Verbalize understanding of risk factor reduction measures to prevent injury from fall in the home  Outcome: Progressing  Goal: Use assistive devices by end of  the shift  Outcome: Progressing  Goal: Pace activities to prevent fatigue by end of the shift  Outcome: Progressing     Problem: Pain  Goal: Takes deep breaths with improved pain control throughout the shift  Outcome: Progressing  Goal: Turns in bed with improved pain control throughout the shift  Outcome: Progressing  Goal: Walks with improved pain control throughout the shift  Outcome: Progressing  Goal: Performs ADL's with improved pain control throughout shift  Outcome: Progressing  Goal: Participates in PT with improved pain control throughout the shift  Outcome: Progressing  Goal: Free from opioid side effects throughout the shift  Outcome: Progressing  Goal: Free from acute confusion related to pain meds throughout the shift  Outcome: Progressing     Problem: Skin  Goal: Decreased wound size/increased tissue granulation at next dressing change  Outcome: Progressing  Goal: Participates in plan/prevention/treatment measures  Outcome: Progressing  Goal: Prevent/manage excess moisture  Outcome: Progressing  Flowsheets (Taken 2/6/2025 1223)  Prevent/manage excess moisture:   Cleanse incontinence/protect with barrier cream   Monitor for/manage infection if present  Goal: Prevent/minimize sheer/friction injuries  Outcome: Progressing  Goal: Promote/optimize nutrition  Outcome: Progressing  Goal: Promote skin healing  Outcome: Progressing    make progress toward the following goals.

## 2025-02-06 NOTE — PROGRESS NOTES
Spoke with patient's daughter Ayala this morning, discussed plan of care, they would like SNF for the next couple weeks, they are working on a plan for patient to come home. Ayala's first choice was [facility] on [address], second was [facility], which may be [facility], third would possibly be [facility] as she was wanting a place close to her work in [place] so if [facility] cannot accept I will call Ayala to verify [facility] [facility] as next choice. CT team will continue to follow.

## 2025-02-06 NOTE — PROGRESS NOTES
Pt. Transferred to 10th fl. From unit.  Per grecia she has spoken to dtr. And plan at this time is snf, not hospice and will consider hospice while at the snf.  She is requesting the [facility].  [facility]. Referral sent to the [facility], awaiting response.  Pt. With [facility] medicare, will require ins. Precert.    Update:  the [facility] does not have a bed available, [facility], referral sent, they are able to accept.  Hospital ins. Team to obtain precert/[facility].    Update:  ins. Precert has been obtained and good through 2/10.  Physician and grecia aware, will await final discharge order.

## 2025-02-06 NOTE — PROGRESS NOTES
ASSESSMENT & PLAN:     89M with PMH of PE on Eliquis, DM2, DLD, BPH who presented with complaints of dyspnea and confusion. He was initially diagnosed with PNA, treated with abx. He was found to have new HFrEF, for which cardiology was consulted, however pt declined any further invasive evaluation. He was started on GDMT and IV diuresis. He however developed acute precipitous drop on sodium requiring hypertonic saline, prompting ICU transfer. He was managed by nephrology there, received tolvaptan, and Na improved. Transferred back to the floor.     Acute hyponatremia  Acute hypoxemic respiratory failure  CAP  Acute HFrEF  pHTN  Acute metabolic encephalopathy, multifactorial 2/2 above, resolved  Tobacco use disorder  HTN  DLD  DM2  Hx of PE on Eliquis  Generalized weakness  Microcytic anemia    -Na improved to 132 today  -pt does have evidence of some volume overload, spoke with nephrology, will trial po torsemide today, and reassess labs in AM  -Scr 1.32, has been stable  -finish Augmentin for PNA course. WBC down trending, afebrile  -cont scheduled nebs  -wean o2 as tolerated, trial RA today  -new start toprol here for HFrEF GDMT. Valsartan currently on hold. Would not be aggressive on CHF mgmt, pt is on the precipice of hospice.   -Hgb 8.2, stable, no active bleeding  -low dose ssi for DM2  -cont other home meds as ordered  -palliative care following. Family considering hospice, but would like to try snf first for therapy, and then consider as outpt.   -pending snf placement    Vte ppx already on eliquis    Antoine Kaur MD    SUBJECTIVE     Transferred out of ICU yesterday. No acute complaints today. Denies dyspnea at rest in bed.       OBJECTIVE:       Last Recorded Vitals:  Vitals:    02/05/25 2327 02/06/25 0600 02/06/25 0736 02/06/25 0737   BP: 128/59   143/68   BP Location: Right arm   Left arm   Patient Position: Lying   Lying   Pulse: 74   72   Resp: 20   20   Temp: 36.1 °C (97 °F)   35.6 °C (96.1 °F)    TempSrc: Temporal   Temporal   SpO2: 98%  99% 99%   Weight:  103 kg (226 lb 10.1 oz)     Height:           Last I/O:  I/O last 3 completed shifts:  In: 1020 (9.9 mL/kg) [P.O.:660; I.V.:260 (2.5 mL/kg); IV Piggyback:100]  Out: 4750 (46.2 mL/kg) [Urine:4750 (1.3 mL/kg/hr)]  Weight: 102.8 kg     Physical Exam:  GEN: ill appearing, appears stated age, NAD  CV: RRR, no m/r/g, 1+ BLE   LUNGS: coarse crackles diffusely, no acute resp distress, on o2 via nc  ABD: soft, NT  NEURO: A+Ox3, no FND    Inpatient Medications:  amoxicillin-pot clavulanate, 1 tablet, oral, q12h FREDDY  apixaban, 5 mg, oral, BID  atorvastatin, 40 mg, oral, Nightly  guaiFENesin, 600 mg, oral, BID  insulin lispro, 0-5 Units, subcutaneous, Before meals & nightly  ipratropium-albuteroL, 3 mL, nebulization, TID  isosorbide mononitrate ER, 60 mg, oral, Daily  metoprolol succinate XL, 25 mg, oral, Daily  polyethylene glycol, 17 g, oral, Daily  sennosides, 2 tablet, oral, Nightly  tamsulosin, 0.8 mg, oral, Daily  [Held by provider] valsartan, 40 mg, oral, Daily        PRN Medications  PRN medications: acetaminophen **OR** acetaminophen **OR** acetaminophen, albuterol, calcium carbonate, dextrose, dextrose, ipratropium-albuteroL, melatonin, nitroglycerin, ondansetron, oxygen, QUEtiapine    Continuous Medications:         LABS AND IMAGING:     Labs:  Results for orders placed or performed during the hospital encounter of 01/27/25 (from the past 24 hours)   POCT GLUCOSE   Result Value Ref Range    POCT Glucose 177 (H) 74 - 99 mg/dL   POCT GLUCOSE   Result Value Ref Range    POCT Glucose 147 (H) 74 - 99 mg/dL   POCT GLUCOSE   Result Value Ref Range    POCT Glucose 162 (H) 74 - 99 mg/dL   POCT GLUCOSE   Result Value Ref Range    POCT Glucose 154 (H) 74 - 99 mg/dL   Magnesium   Result Value Ref Range    Magnesium 2.64 (H) 1.60 - 2.40 mg/dL   Basic Metabolic Panel   Result Value Ref Range    Glucose 130 (H) 74 - 99 mg/dL    Sodium 132 (L) 136 - 145 mmol/L     Potassium 4.1 3.5 - 5.3 mmol/L    Chloride 96 (L) 98 - 107 mmol/L    Bicarbonate 27 21 - 32 mmol/L    Anion Gap 13 10 - 20 mmol/L    Urea Nitrogen 33 (H) 6 - 23 mg/dL    Creatinine 1.32 (H) 0.50 - 1.30 mg/dL    eGFR 50 (L) >60 mL/min/1.73m*2    Calcium 7.8 (L) 8.6 - 10.3 mg/dL   CBC and Auto Differential   Result Value Ref Range    WBC 13.5 (H) 4.4 - 11.3 x10*3/uL    nRBC 0.0 0.0 - 0.0 /100 WBCs    RBC 3.29 (L) 4.50 - 5.90 x10*6/uL    Hemoglobin 8.2 (L) 13.5 - 17.5 g/dL    Hematocrit 25.3 (L) 41.0 - 52.0 %    MCV 77 (L) 80 - 100 fL    MCH 24.9 (L) 26.0 - 34.0 pg    MCHC 32.4 32.0 - 36.0 g/dL    RDW 15.7 (H) 11.5 - 14.5 %    Platelets 552 (H) 150 - 450 x10*3/uL    Neutrophils % 70.1 40.0 - 80.0 %    Immature Granulocytes %, Automated 1.0 (H) 0.0 - 0.9 %    Lymphocytes % 19.5 13.0 - 44.0 %    Monocytes % 8.7 2.0 - 10.0 %    Eosinophils % 0.6 0.0 - 6.0 %    Basophils % 0.1 0.0 - 2.0 %    Neutrophils Absolute 9.43 (H) 1.60 - 5.50 x10*3/uL    Immature Granulocytes Absolute, Automated 0.14 0.00 - 0.50 x10*3/uL    Lymphocytes Absolute 2.63 0.80 - 3.00 x10*3/uL    Monocytes Absolute 1.17 (H) 0.05 - 0.80 x10*3/uL    Eosinophils Absolute 0.08 0.00 - 0.40 x10*3/uL    Basophils Absolute 0.02 0.00 - 0.10 x10*3/uL   POCT GLUCOSE   Result Value Ref Range    POCT Glucose 131 (H) 74 - 99 mg/dL   POCT GLUCOSE   Result Value Ref Range    POCT Glucose 173 (H) 74 - 99 mg/dL        Imaging:  XR chest 1 view  Narrative: Interpreted By:  Jeanne Anthony,   STUDY:  XR CHEST 1 VIEW;  2/4/2025 8:06 am      INDICATION:  Signs/Symptoms:Hypoxia.          COMPARISON:  02/03/2025.      ACCESSION NUMBER(S):  [id number]      ORDERING CLINICIAN:  JEANNE LARKIN      FINDINGS:  CARDIOMEDIASTINAL SILHOUETTE:  Cardiomegaly and dense aortic calcifications are again seen.      LUNGS:  Irregular interstitial thickening is present with patchy multifocal  infiltrates greatest in the perihilar regions and lung bases. Small  pleural effusions not  excluded. No appreciable pneumothorax.      ABDOMEN:  Some residual enteric contrast is seen within the stomach or bowel of  the left upper quadrant.      BONES:  Multilevel endplate spurring present in the spine as well as  partially visualized degenerative changes of the shoulders. Nonacute  appearing contour irregularity of the right distal clavicle similar  to prior is most compatible with old trauma.      Impression: 1.  Irregular interstitial thickening with patchy multifocal  infiltrates greatest in the perihilar regions and lung bases may be  due to multifocal infection and/or edema. Small effusions not  excluded.              MACRO:  None.      Signed by: Rey Anthony 2/4/2025 8:46 AM  Dictation workstation:   [id number]  ECG 12 Lead  atrial flutter with 2:1 AV conduction  Low voltage QRS  Abnormal ECG  When compared with ECG of 03-FEB-2025 05:19, (unconfirmed)  ventricular rate has increased  Confirmed by Florencia Pierson ([id number]) on 2/4/2025 8:29:50 AM

## 2025-02-06 NOTE — PROGRESS NOTES
Renal Progress Note    Assessment:  89 y.o. yo male admitted with sob.  Found to have PNA and new CHF with ef 35% and pulm HTN.  Na 136 on admission but down to 118 when initial consult done.  Looks hypervolemic.  Sodium level was dropping even before he got lasix so cannot blame entirely on lasix.  On SSRI.  Urine sg 1.019 on admission.  NO hydronephrosis noted on renal ultrasound, has nml sized kidneys. Poor response to diuretics, tolvaptan and salt tabs. Only responded to 3% NS however due to c/f fluid overload put patient on NS at low rate. Has had significant improvement      Plan:  - would hold off of from further IVFs at this time due to fluid overload  - monitor UOP (diuresing well due to correction of Na), no diuretics for now  - will give lasix/tolvaptan PRN  - keep valsartan on hold as well  -Follow-up for the next 24 hours if the patient continue to be stable we will sign off discussed with the patient and son    Subjective:   Admit Date: 1/27/2025    Interval History: Patient seen and examined uneventful night transferred from critical care bed alert follow command preserved appetite but not much eating      Medications:   Scheduled Meds:amoxicillin-pot clavulanate, 1 tablet, oral, q12h FREDDY  apixaban, 5 mg, oral, BID  atorvastatin, 40 mg, oral, Nightly  guaiFENesin, 600 mg, oral, BID  insulin lispro, 0-5 Units, subcutaneous, Before meals & nightly  ipratropium-albuteroL, 3 mL, nebulization, TID  isosorbide mononitrate ER, 60 mg, oral, Daily  metoprolol succinate XL, 25 mg, oral, Daily  polyethylene glycol, 17 g, oral, Daily  sennosides, 2 tablet, oral, Nightly  tamsulosin, 0.8 mg, oral, Daily  [Held by provider] valsartan, 40 mg, oral, Daily      Continuous Infusions:     CBC:   Lab Results   Component Value Date    HGB 8.2 (L) 02/06/2025    HGB 8.1 (L) 02/05/2025    WBC 13.5 (H) 02/06/2025    WBC 15.0 (H) 02/05/2025     (H) 02/06/2025     (H) 02/05/2025      Anemia:  No results found for:  "\"FERRITIN\", \"IRON\", \"TIBC\"   BMP:    Lab Results   Component Value Date     (L) 02/06/2025     (L) 02/05/2025    K 4.1 02/06/2025    K 4.4 02/05/2025    CL 96 (L) 02/06/2025    CL 90 (L) 02/05/2025    CO2 27 02/06/2025    CO2 27 02/05/2025    BUN 33 (H) 02/06/2025    BUN 33 (H) 02/05/2025    CREATININE 1.32 (H) 02/06/2025    CREATININE 1.30 02/05/2025      Bone disease: No results found for: \"PHOS\", \"PTH\", \"VITD25\"   Urinalysis:  No results found for: \"JACKELINE\", \"PROTUR\", \"GLUCOSEU\", \"BLOODU\", \"KETONESU\", \"BILIRUBINU\", \"NITRITEU\", \"LEUKOCYTESU\", \"UTPCR\"     Objective:   Vitals: /68 (BP Location: Left arm, Patient Position: Lying)   Pulse 72   Temp 35.6 °C (96.1 °F) (Temporal)   Resp 20   Ht 1.88 m (6' 2.02\")   Wt 103 kg (226 lb 10.1 oz)   SpO2 99%   BMI 29.09 kg/m²    Wt Readings from Last 3 Encounters:   02/06/25 103 kg (226 lb 10.1 oz)   11/12/24 97.5 kg (215 lb)   08/13/24 97.6 kg (215 lb 3.2 oz)      24HR INTAKE/OUTPUT:    Intake/Output Summary (Last 24 hours) at 2/6/2025 1137  Last data filed at 2/6/2025 0829  Gross per 24 hour   Intake 620 ml   Output 1300 ml   Net -680 ml     Admission weight:  Weight: 99.8 kg (220 lb)      Constitutional:  Alert, awake, no apparent distress   Skin:normal, no rash  HEENT:sclera anicteric.  Head atraumatic normocephalic  Neck:supple with no thyromegally  Cardiovascular:  S1, S2 without m/r/g   Respiratory:  CTA B without w/r/r   Abdomen: +bs, soft, nt  Ext: no LE edema  Musculoskeletal:Intact  Neuro:Alert and oriented with no deficit      Electronically signed by Handy Schaffer MD on 2/6/2025 at 11:37 AM            "

## 2025-02-06 NOTE — CONSULTS
"Nutrition Initial Assessment:   Nutrition Assessment    Reason for Assessment: Length of stay    Patient is a 89 y.o. male presenting with community acquired pneumonia  PMH of PE on Eliquis, DM2, DLD, BPH who presented with complaints of dyspnea and confusion. He was initially diagnosed with PNA, treated with abx. He was found to have new HFrEF, for which cardiology was consulted, however pt declined any further invasive evaluation. He was started on GDMT and IV diuresis. He however developed acute precipitous drop on sodium requiring hypertonic saline, prompting ICU transfer. He was managed by nephrology there, received tolvaptan, and Na improved. Transferred back to the floor.       Nutrition History:  Energy Intake: Fair 50-75 %  Food and Nutrient History: RDN self consult for LOS. Attempted to meet with pt however pt sleeping, no family at bedside. Chart reviewed and discussed with RN who reports not taking much po. On fluid restriction due to fluid overload, hyponatremia - will trial Magic Cup with meals. Will continue to monitor.  Vitamin/Herbal Supplement Use: MVI per home med list       Anthropometrics:  Height: 188 cm (6' 2.02\")   Weight: 103 kg (226 lb 10.1 oz)   BMI (Calculated): 29.09  IBW/kg (Dietitian Calculated): 86.4 kg  Percent of IBW: 119 %       Weight History:   Wt Readings from Last 10 Encounters:   02/06/25 103 kg (226 lb 10.1 oz)   01/27/25 99.8 kg (220 lb)   11/12/24 97.5 kg (215 lb)   08/13/24 97.6 kg (215 lb 3.2 oz)   05/14/24 96.3 kg (212 lb 6.4 oz)   02/20/24 97.1 kg (214 lb)   11/21/23 98.9 kg (218 lb)   08/15/23 96.2 kg (212 lb)   06/29/23 90.7 kg (200 lb)   05/09/23 97.6 kg (215 lb 3.2 oz)      Weight Change %:  Weight History / % Weight Change: 6 lb wt gain since admission, likely to see wt loss as fluid status improves    Nutrition Focused Physical Exam Findings:    Subcutaneous Fat Loss:   Defer Subcutaneous Fat Loss Assessment: Defer all  Muscle Wasting:  Defer Muscle Wasting " Assessment: Defer all  Defer All Reason: pt sleeping no visible losses  Edema:  Edema: +2 mild  Edema Location: BLE, nonpitting generalized and BUE  Physical Findings:  Skin: Negative (for PI)    Nutrition Significant Labs:  BMP Trend:   Results from last 7 days   Lab Units 02/06/25  0533 02/05/25  0421 02/04/25  2357 02/04/25  1753   GLUCOSE mg/dL 130* 123* 145* 179*   CALCIUM mg/dL 7.8* 7.5* 7.2* 7.3*   SODIUM mmol/L 132* 125* 122* 118*   POTASSIUM mmol/L 4.1 4.4 4.5 5.2   CO2 mmol/L 27 27 26 21   CHLORIDE mmol/L 96* 90* 88* 85*   BUN mg/dL 33* 33* 34* 37*   CREATININE mg/dL 1.32* 1.30 1.27 1.35*    , A1C:  Lab Results   Component Value Date    HGBA1C 6.3 (H) 12/20/2024   , BG POCT trend:   Results from last 7 days   Lab Units 02/06/25  1109 02/06/25  0630 02/05/25  2224 02/05/25 2012 02/05/25  1622   POCT GLUCOSE mg/dL 173* 131* 154* 162* 147*        Nutrition Specific Medications:  Scheduled medications  amoxicillin-pot clavulanate, 1 tablet, oral, q12h FREDDY  atorvastatin, 40 mg, oral, Nightly  insulin lispro, 0-5 Units, subcutaneous, Before meals & nightly  [Held by provider] polyethylene glycol, 17 g, oral, Daily  [Held by provider] sennosides, 2 tablet, oral, Nightly    I/O:   Last BM Date: 02/06/25; Stool Appearance: Watery, Loose (02/06/25 1201)    Dietary Orders (From admission, onward)       Start     Ordered    02/02/25 1934  Adult diet Regular, Consistent Carb; CCD 60 gm/meal; 1500 mL fluid  Diet effective now        Question Answer Comment   Diet type Regular    Diet type Consistent Carb    Carb diet selection: CCD 60 gm/meal    Dietary fluid restriction / 24h: 1500 mL fluid        02/02/25 1934 01/27/25 1852  May Participate in Room Service  ( ROOM SERVICE MAY PARTICIPATE)  Once        Question:  .  Answer:  Yes    01/27/25 1851                     Estimated Needs:   Total Energy Estimated Needs in 24 hours (kCal): 2160 kCal  Method for Estimating Needs: 25 kcal/kg IBW  Total Protein Estimated  Needs in 24 Hours (g): 104 g  Method for Estimating 24 Hour Protein Needs: 1.2 g/kg IBW  Total Fluid Estimated Needs in 24 Hours (mL): 2160 mL  Method for Estimating 24 Hour Fluid Needs: 1 ml/kcal or per MD  Patient on Order Fluid Restriction: Yes        Nutrition Diagnosis   Malnutrition Diagnosis  Patient has Malnutrition Diagnosis: No    Nutrition Diagnosis  Patient has Nutrition Diagnosis: Yes  Diagnosis Status (1): New  Nutrition Diagnosis 1: Inadequate oral intake  Related to (1): acute illness, advanced age of 89  As Evidenced by (1): 56% average po intake x 4 documented meals, need for ONS       Nutrition Interventions/Recommendations   Nutrition prescription for oral nutrition    Nutrition Recommendations:  Individualized Nutrition Prescription Provided for : 60g Consistent Carb, 1500 ml fluid restriction diet with ONS    Nutrition Interventions/Goals:   Interventions: Meals and snacks, Medical food supplement  Meals and Snacks: Carbohydrate-modified diet, Fluid-modified diet, General healthful diet  Goal: Consumes 3 meals per day  Medical Food Supplement: Commercial beverage medical food supplement therapy  Goal: Magic Cup Reduced Sugar TID (provides 280 kcal and 9 g protein per serving).  Coordination of Care with Providers: Nursing      Education Documentation  No documentation found.     Not appropriate at this time       Nutrition Monitoring and Evaluation   Food/Nutrient Related History Monitoring  Monitoring and Evaluation Plan: Intake / amount of food, Estimated Energy Intake  Estimated Energy Intake: Energy intake greater or equal to 75% of estimated energy needs  Intake / Amount of food: Consumes at least 75% or more of meals/snacks/supplements, Meets > 75% estimated energy needs    Anthropometric Measurements  Monitoring and Evaluation Plan: Body weight  Body Weight: Body weight - Maintain stable weight, Body weight - Weight reduction from fluids, as needed    Biochemical Data, Medical Tests and  Procedures  Monitoring and Evaluation Plan: Electrolyte/renal panel, Glucose/endocrine profile  Electrolyte and Renal Panel: Electrolytes within normal limits  Glucose/Endocrine Profile: Glucose within normal limits ( mg/dL)              Time Spent (min): 40 minutes

## 2025-02-07 VITALS
HEART RATE: 71 BPM | HEIGHT: 74 IN | WEIGHT: 235.89 LBS | TEMPERATURE: 97.2 F | BODY MASS INDEX: 30.27 KG/M2 | SYSTOLIC BLOOD PRESSURE: 121 MMHG | DIASTOLIC BLOOD PRESSURE: 59 MMHG | RESPIRATION RATE: 19 BRPM | OXYGEN SATURATION: 96 %

## 2025-02-07 LAB
ANION GAP SERPL CALC-SCNC: 13 MMOL/L (ref 10–20)
BASOPHILS # BLD AUTO: 0.02 X10*3/UL (ref 0–0.1)
BASOPHILS NFR BLD AUTO: 0.2 %
BUN SERPL-MCNC: 36 MG/DL (ref 6–23)
CALCIUM SERPL-MCNC: 7.9 MG/DL (ref 8.6–10.3)
CHLORIDE SERPL-SCNC: 100 MMOL/L (ref 98–107)
CO2 SERPL-SCNC: 27 MMOL/L (ref 21–32)
CREAT SERPL-MCNC: 1.44 MG/DL (ref 0.5–1.3)
EGFRCR SERPLBLD CKD-EPI 2021: 45 ML/MIN/1.73M*2
EOSINOPHIL # BLD AUTO: 0.17 X10*3/UL (ref 0–0.4)
EOSINOPHIL NFR BLD AUTO: 1.3 %
ERYTHROCYTE [DISTWIDTH] IN BLOOD BY AUTOMATED COUNT: 16.2 % (ref 11.5–14.5)
GLUCOSE BLD MANUAL STRIP-MCNC: 129 MG/DL (ref 74–99)
GLUCOSE BLD MANUAL STRIP-MCNC: 196 MG/DL (ref 74–99)
GLUCOSE SERPL-MCNC: 125 MG/DL (ref 74–99)
HCT VFR BLD AUTO: 24.8 % (ref 41–52)
HGB BLD-MCNC: 7.9 G/DL (ref 13.5–17.5)
HOLD SPECIMEN: NORMAL
IMM GRANULOCYTES # BLD AUTO: 0.15 X10*3/UL (ref 0–0.5)
IMM GRANULOCYTES NFR BLD AUTO: 1.2 % (ref 0–0.9)
LYMPHOCYTES # BLD AUTO: 2.51 X10*3/UL (ref 0.8–3)
LYMPHOCYTES NFR BLD AUTO: 19.4 %
MAGNESIUM SERPL-MCNC: 2.53 MG/DL (ref 1.6–2.4)
MCH RBC QN AUTO: 25.1 PG (ref 26–34)
MCHC RBC AUTO-ENTMCNC: 31.9 G/DL (ref 32–36)
MCV RBC AUTO: 79 FL (ref 80–100)
MONOCYTES # BLD AUTO: 1.03 X10*3/UL (ref 0.05–0.8)
MONOCYTES NFR BLD AUTO: 8 %
NEUTROPHILS # BLD AUTO: 9.05 X10*3/UL (ref 1.6–5.5)
NEUTROPHILS NFR BLD AUTO: 69.9 %
NRBC BLD-RTO: 0 /100 WBCS (ref 0–0)
PLATELET # BLD AUTO: 524 X10*3/UL (ref 150–450)
POTASSIUM SERPL-SCNC: 4.2 MMOL/L (ref 3.5–5.3)
RBC # BLD AUTO: 3.15 X10*6/UL (ref 4.5–5.9)
SODIUM SERPL-SCNC: 136 MMOL/L (ref 136–145)
WBC # BLD AUTO: 12.9 X10*3/UL (ref 4.4–11.3)

## 2025-02-07 PROCEDURE — 2500000001 HC RX 250 WO HCPCS SELF ADMINISTERED DRUGS (ALT 637 FOR MEDICARE OP)

## 2025-02-07 PROCEDURE — 99239 HOSP IP/OBS DSCHRG MGMT >30: CPT | Performed by: INTERNAL MEDICINE

## 2025-02-07 PROCEDURE — 2500000001 HC RX 250 WO HCPCS SELF ADMINISTERED DRUGS (ALT 637 FOR MEDICARE OP): Performed by: INTERNAL MEDICINE

## 2025-02-07 PROCEDURE — 36415 COLL VENOUS BLD VENIPUNCTURE: CPT

## 2025-02-07 PROCEDURE — 2500000002 HC RX 250 W HCPCS SELF ADMINISTERED DRUGS (ALT 637 FOR MEDICARE OP, ALT 636 FOR OP/ED)

## 2025-02-07 PROCEDURE — 94640 AIRWAY INHALATION TREATMENT: CPT

## 2025-02-07 PROCEDURE — 2500000005 HC RX 250 GENERAL PHARMACY W/O HCPCS: Performed by: INTERNAL MEDICINE

## 2025-02-07 PROCEDURE — 2500000004 HC RX 250 GENERAL PHARMACY W/ HCPCS (ALT 636 FOR OP/ED)

## 2025-02-07 PROCEDURE — 82947 ASSAY GLUCOSE BLOOD QUANT: CPT

## 2025-02-07 PROCEDURE — 97530 THERAPEUTIC ACTIVITIES: CPT | Mod: GP,CQ

## 2025-02-07 PROCEDURE — 85025 COMPLETE CBC W/AUTO DIFF WBC: CPT

## 2025-02-07 PROCEDURE — 80048 BASIC METABOLIC PNL TOTAL CA: CPT

## 2025-02-07 PROCEDURE — 83735 ASSAY OF MAGNESIUM: CPT

## 2025-02-07 RX ORDER — TORSEMIDE 10 MG/1
10 TABLET ORAL DAILY
Start: 2025-02-07

## 2025-02-07 RX ORDER — QUETIAPINE FUMARATE 25 MG/1
25 TABLET, FILM COATED ORAL NIGHTLY PRN
Start: 2025-02-07

## 2025-02-07 RX ORDER — GUAIFENESIN 600 MG/1
600 TABLET, EXTENDED RELEASE ORAL 2 TIMES DAILY
Start: 2025-02-07 | End: 2025-02-12

## 2025-02-07 RX ORDER — ISOSORBIDE MONONITRATE 60 MG/1
60 TABLET, EXTENDED RELEASE ORAL DAILY
Start: 2025-02-08

## 2025-02-07 RX ORDER — AMOXICILLIN AND CLAVULANATE POTASSIUM 875; 125 MG/1; MG/1
1 TABLET, FILM COATED ORAL EVERY 12 HOURS SCHEDULED
Qty: 3 TABLET | Refills: 0 | Status: SHIPPED | OUTPATIENT
Start: 2025-02-07 | End: 2025-02-09

## 2025-02-07 RX ORDER — IPRATROPIUM BROMIDE AND ALBUTEROL SULFATE 2.5; .5 MG/3ML; MG/3ML
3 SOLUTION RESPIRATORY (INHALATION) 3 TIMES DAILY
Start: 2025-02-07

## 2025-02-07 RX ORDER — METOPROLOL SUCCINATE 25 MG/1
25 TABLET, EXTENDED RELEASE ORAL DAILY
Start: 2025-02-08

## 2025-02-07 RX ORDER — TORSEMIDE 20 MG/1
10 TABLET ORAL ONCE
Status: COMPLETED | OUTPATIENT
Start: 2025-02-07 | End: 2025-02-07

## 2025-02-07 RX ADMIN — Medication 2 L/MIN: at 12:31

## 2025-02-07 RX ADMIN — TAMSULOSIN HYDROCHLORIDE 0.8 MG: 0.4 CAPSULE ORAL at 08:03

## 2025-02-07 RX ADMIN — METOPROLOL SUCCINATE 25 MG: 25 TABLET, EXTENDED RELEASE ORAL at 11:11

## 2025-02-07 RX ADMIN — IPRATROPIUM BROMIDE AND ALBUTEROL SULFATE 3 ML: .5; 3 SOLUTION RESPIRATORY (INHALATION) at 06:35

## 2025-02-07 RX ADMIN — GUAIFENESIN 600 MG: 600 TABLET, EXTENDED RELEASE ORAL at 08:03

## 2025-02-07 RX ADMIN — AMOXICILLIN AND CLAVULANATE POTASSIUM 1 TABLET: 875; 125 TABLET, FILM COATED ORAL at 08:03

## 2025-02-07 RX ADMIN — Medication 2 L/MIN: at 06:35

## 2025-02-07 RX ADMIN — APIXABAN 5 MG: 5 TABLET, FILM COATED ORAL at 08:03

## 2025-02-07 RX ADMIN — TORSEMIDE 10 MG: 20 TABLET ORAL at 13:40

## 2025-02-07 RX ADMIN — ISOSORBIDE MONONITRATE 60 MG: 60 TABLET, EXTENDED RELEASE ORAL at 08:03

## 2025-02-07 RX ADMIN — IPRATROPIUM BROMIDE AND ALBUTEROL SULFATE 3 ML: .5; 3 SOLUTION RESPIRATORY (INHALATION) at 12:31

## 2025-02-07 ASSESSMENT — COGNITIVE AND FUNCTIONAL STATUS - GENERAL
WALKING IN HOSPITAL ROOM: A LITTLE
STANDING UP FROM CHAIR USING ARMS: A LOT
MOVING TO AND FROM BED TO CHAIR: A LITTLE
TOILETING: A LOT
MOBILITY SCORE: 11
DRESSING REGULAR LOWER BODY CLOTHING: TOTAL
MOVING FROM LYING ON BACK TO SITTING ON SIDE OF FLAT BED WITH BEDRAILS: A LOT
MOVING FROM LYING ON BACK TO SITTING ON SIDE OF FLAT BED WITH BEDRAILS: A LITTLE
TURNING FROM BACK TO SIDE WHILE IN FLAT BAD: A LOT
EATING MEALS: A LITTLE
TURNING FROM BACK TO SIDE WHILE IN FLAT BAD: A LOT
HELP NEEDED FOR BATHING: TOTAL
MOBILITY SCORE: 13
WALKING IN HOSPITAL ROOM: TOTAL
STANDING UP FROM CHAIR USING ARMS: A LOT
PERSONAL GROOMING: A LITTLE
DRESSING REGULAR UPPER BODY CLOTHING: A LOT
MOVING TO AND FROM BED TO CHAIR: A LOT
CLIMB 3 TO 5 STEPS WITH RAILING: TOTAL
DAILY ACTIVITIY SCORE: 12
CLIMB 3 TO 5 STEPS WITH RAILING: TOTAL

## 2025-02-07 ASSESSMENT — PAIN SCALES - GENERAL
PAINLEVEL_OUTOF10: 0 - NO PAIN
PAINLEVEL_OUTOF10: 0 - NO PAIN

## 2025-02-07 ASSESSMENT — PAIN - FUNCTIONAL ASSESSMENT: PAIN_FUNCTIONAL_ASSESSMENT: 0-10

## 2025-02-07 NOTE — NURSING NOTE
RRN 24hr follow-up:  Down graded on 02/05/25. VSS. 93% 2LNC. Aflutter on tele. A&Ox4. No SOB. No concerns from nursing staff at this time.

## 2025-02-07 NOTE — DISCHARGE SUMMARY
DISCHARGE DIAGNOSIS     Acute hyponatremia  Acute hypoxemic respiratory failure  CAP  Acute HFrEF  pHTN  Acute metabolic encephalopathy, multifactorial 2/2 above, resolved  Tobacco use disorder  HTN  DLD  DM2  Hx of PE on Eliquis  Generalized weakness  Microcytic anemia    HOSPITAL COURSE AND DETAILS     89M with PMH of PE on Eliquis, DM2, DLD, BPH who presented with complaints of dyspnea and confusion. He was initially diagnosed with PNA, treated with abx. He was found to have new HFrEF, for which cardiology was consulted, however pt declined any further invasive evaluation. He was started on GDMT and IV diuresis. He however developed acute precipitous drop on sodium requiring hypertonic saline, prompting ICU transfer. He was managed by nephrology there, received tolvaptan, and Na improved. Transferred back to the floor.      -was resumed on low dose torsemide, Na improved to normal 136, renal function stable, scr 1.44  -spoke with nephrology, plan will be for torsemide 10mg daily on dc  -finish Augmentin x7d for PNA course. WBC down trending, afebrile  -can cont scheduled nebs, mucinex, Acapella, for bronchial hygiene at snf, should have RT see him there  -wean o2 as tolerated, doing well on 2L O2. Cont to wean as tolerated. Could keep 2L for comfort if needed.   -new start toprol, Imdur by cardiology here for HFrEF. Valsartan will not continue on dc. Would not be aggressive on CHF mgmt as pt is at end of life.   -palliative care following. Family considering hospice, but would like to try snf first for therapy, and then consider after reeval in 1-2 weeks. I spoke to them at length about poor prognosis overall, and they are understanding.     Stable for dc to SNF. Total time spent on discharge services 36 minutes.     DISCHARGE PHYSICAL EXAM     Last Recorded Vitals:  Vitals:    02/07/25 0017 02/07/25 0036 02/07/25 0635 02/07/25 0724   BP: 134/61   121/59   BP Location:    Right arm   Patient Position:    Lying    Pulse: 78   71   Resp: 22   19   Temp: 36.4 °C (97.5 °F)   36.2 °C (97.2 °F)   TempSrc:    Temporal   SpO2: 96%  95% 92%   Weight:  107 kg (235 lb 14.3 oz)     Height:           Physical Exam:  GEN: ill appearing, appears stated age, NAD  CV: RRR, no m/r/g, 1+ BLE edema  LUNGS: coarse crackles diffusely, no acute resp distress, on o2 via nc  ABD: soft, NT  NEURO: A+Ox1-2, no FND      PERTINENT LABS AND IMAGING     Results for orders placed or performed during the hospital encounter of 01/27/25 (from the past 96 hours)   Vancomycin   Result Value Ref Range    Vancomycin 17.7 5.0 - 20.0 ug/mL   Basic metabolic panel   Result Value Ref Range    Glucose 255 (H) 74 - 99 mg/dL    Sodium 115 (LL) 136 - 145 mmol/L    Potassium 5.1 3.5 - 5.3 mmol/L    Chloride 81 (L) 98 - 107 mmol/L    Bicarbonate 22 21 - 32 mmol/L    Anion Gap 17 10 - 20 mmol/L    Urea Nitrogen 39 (H) 6 - 23 mg/dL    Creatinine 1.32 (H) 0.50 - 1.30 mg/dL    eGFR 50 (L) >60 mL/min/1.73m*2    Calcium 7.1 (L) 8.6 - 10.3 mg/dL   POCT GLUCOSE   Result Value Ref Range    POCT Glucose 239 (H) 74 - 99 mg/dL   Vancomycin   Result Value Ref Range    Vancomycin 11.8 5.0 - 20.0 ug/mL   Basic metabolic panel   Result Value Ref Range    Glucose 205 (H) 74 - 99 mg/dL    Sodium 114 (LL) 136 - 145 mmol/L    Potassium 5.3 3.5 - 5.3 mmol/L    Chloride 81 (L) 98 - 107 mmol/L    Bicarbonate 22 21 - 32 mmol/L    Anion Gap 16 10 - 20 mmol/L    Urea Nitrogen 43 (H) 6 - 23 mg/dL    Creatinine 1.35 (H) 0.50 - 1.30 mg/dL    eGFR 48 (L) >60 mL/min/1.73m*2    Calcium 7.3 (L) 8.6 - 10.3 mg/dL   Osmolality   Result Value Ref Range    Osmolality, Serum 472 (H) 280 - 300 mOsm/kg   Magnesium   Result Value Ref Range    Magnesium 2.14 1.60 - 2.40 mg/dL   Basic Metabolic Panel   Result Value Ref Range    Glucose 189 (H) 74 - 99 mg/dL    Sodium 115 (LL) 136 - 145 mmol/L    Potassium 5.0 3.5 - 5.3 mmol/L    Chloride 82 (L) 98 - 107 mmol/L    Bicarbonate 22 21 - 32 mmol/L    Anion Gap 16 10 -  20 mmol/L    Urea Nitrogen 42 (H) 6 - 23 mg/dL    Creatinine 1.25 0.50 - 1.30 mg/dL    eGFR 53 (L) >60 mL/min/1.73m*2    Calcium 7.2 (L) 8.6 - 10.3 mg/dL   CBC and Auto Differential   Result Value Ref Range    WBC 17.5 (H) 4.4 - 11.3 x10*3/uL    nRBC 0.2 (H) 0.0 - 0.0 /100 WBCs    RBC 3.20 (L) 4.50 - 5.90 x10*6/uL    Hemoglobin 8.0 (L) 13.5 - 17.5 g/dL    Hematocrit 24.5 (L) 41.0 - 52.0 %    MCV 77 (L) 80 - 100 fL    MCH 25.0 (L) 26.0 - 34.0 pg    MCHC 32.7 32.0 - 36.0 g/dL    RDW 15.4 (H) 11.5 - 14.5 %    Platelets 446 150 - 450 x10*3/uL    Neutrophils % 75.2 40.0 - 80.0 %    Immature Granulocytes %, Automated 3.5 (H) 0.0 - 0.9 %    Lymphocytes % 13.0 13.0 - 44.0 %    Monocytes % 8.2 2.0 - 10.0 %    Eosinophils % 0.0 0.0 - 6.0 %    Basophils % 0.1 0.0 - 2.0 %    Neutrophils Absolute 13.17 (H) 1.60 - 5.50 x10*3/uL    Immature Granulocytes Absolute, Automated 0.61 (H) 0.00 - 0.50 x10*3/uL    Lymphocytes Absolute 2.27 0.80 - 3.00 x10*3/uL    Monocytes Absolute 1.44 (H) 0.05 - 0.80 x10*3/uL    Eosinophils Absolute 0.00 0.00 - 0.40 x10*3/uL    Basophils Absolute 0.02 0.00 - 0.10 x10*3/uL   TSH with reflex to Free T4 if abnormal   Result Value Ref Range    Thyroid Stimulating Hormone 1.11 0.44 - 3.98 mIU/L   POCT GLUCOSE   Result Value Ref Range    POCT Glucose 171 (H) 74 - 99 mg/dL   SST TOP   Result Value Ref Range    Extra Tube Hold for add-ons.    Basic Metabolic Panel   Result Value Ref Range    Glucose 171 (H) 74 - 99 mg/dL    Sodium 116 (LL) 136 - 145 mmol/L    Potassium 4.9 3.5 - 5.3 mmol/L    Chloride 83 (L) 98 - 107 mmol/L    Bicarbonate 24 21 - 32 mmol/L    Anion Gap 14 10 - 20 mmol/L    Urea Nitrogen 40 (H) 6 - 23 mg/dL    Creatinine 1.33 (H) 0.50 - 1.30 mg/dL    eGFR 49 (L) >60 mL/min/1.73m*2    Calcium 7.2 (L) 8.6 - 10.3 mg/dL   POCT GLUCOSE   Result Value Ref Range    POCT Glucose 138 (H) 74 - 99 mg/dL   Basic metabolic panel   Result Value Ref Range    Glucose 160 (H) 74 - 99 mg/dL    Sodium 117 (LL) 136  - 145 mmol/L    Potassium 4.8 3.5 - 5.3 mmol/L    Chloride 83 (L) 98 - 107 mmol/L    Bicarbonate 24 21 - 32 mmol/L    Anion Gap 15 10 - 20 mmol/L    Urea Nitrogen 38 (H) 6 - 23 mg/dL    Creatinine 1.35 (H) 0.50 - 1.30 mg/dL    eGFR 48 (L) >60 mL/min/1.73m*2    Calcium 7.3 (L) 8.6 - 10.3 mg/dL   Lipid panel   Result Value Ref Range    Cholesterol 144 0 - 199 mg/dL    HDL-Cholesterol 43.6 mg/dL    Cholesterol/HDL Ratio 3.3     LDL Calculated 84 <=99 mg/dL    VLDL 17 0 - 40 mg/dL    Triglycerides 83 0 - 149 mg/dL    Non HDL Cholesterol 100 0 - 149 mg/dL   Hepatic function panel   Result Value Ref Range    Albumin 3.2 (L) 3.4 - 5.0 g/dL    Bilirubin, Total 0.4 0.0 - 1.2 mg/dL    Bilirubin, Direct 0.1 0.0 - 0.3 mg/dL    Alkaline Phosphatase 119 33 - 136 U/L     (H) 10 - 52 U/L     (H) 9 - 39 U/L    Total Protein 6.0 (L) 6.4 - 8.2 g/dL   POCT GLUCOSE   Result Value Ref Range    POCT Glucose 176 (H) 74 - 99 mg/dL   Osmolality, urine   Result Value Ref Range    Osmolality, Urine Random 122 (L) 200 - 1,200 mOsm/kg   Basic metabolic panel   Result Value Ref Range    Glucose 179 (H) 74 - 99 mg/dL    Sodium 118 (LL) 136 - 145 mmol/L    Potassium 5.2 3.5 - 5.3 mmol/L    Chloride 85 (L) 98 - 107 mmol/L    Bicarbonate 21 21 - 32 mmol/L    Anion Gap 17 10 - 20 mmol/L    Urea Nitrogen 37 (H) 6 - 23 mg/dL    Creatinine 1.35 (H) 0.50 - 1.30 mg/dL    eGFR 48 (L) >60 mL/min/1.73m*2    Calcium 7.3 (L) 8.6 - 10.3 mg/dL   Osmolality   Result Value Ref Range    Osmolality, Serum 269 (L) 280 - 300 mOsm/kg   POCT GLUCOSE   Result Value Ref Range    POCT Glucose 197 (H) 74 - 99 mg/dL   Basic metabolic panel   Result Value Ref Range    Glucose 145 (H) 74 - 99 mg/dL    Sodium 122 (L) 136 - 145 mmol/L    Potassium 4.5 3.5 - 5.3 mmol/L    Chloride 88 (L) 98 - 107 mmol/L    Bicarbonate 26 21 - 32 mmol/L    Anion Gap 13 10 - 20 mmol/L    Urea Nitrogen 34 (H) 6 - 23 mg/dL    Creatinine 1.27 0.50 - 1.30 mg/dL    eGFR 52 (L) >60  mL/min/1.73m*2    Calcium 7.2 (L) 8.6 - 10.3 mg/dL   SST TOP   Result Value Ref Range    Extra Tube Hold for add-ons.    Magnesium   Result Value Ref Range    Magnesium 2.44 (H) 1.60 - 2.40 mg/dL   Basic Metabolic Panel   Result Value Ref Range    Glucose 123 (H) 74 - 99 mg/dL    Sodium 125 (L) 136 - 145 mmol/L    Potassium 4.4 3.5 - 5.3 mmol/L    Chloride 90 (L) 98 - 107 mmol/L    Bicarbonate 27 21 - 32 mmol/L    Anion Gap 12 10 - 20 mmol/L    Urea Nitrogen 33 (H) 6 - 23 mg/dL    Creatinine 1.30 0.50 - 1.30 mg/dL    eGFR 51 (L) >60 mL/min/1.73m*2    Calcium 7.5 (L) 8.6 - 10.3 mg/dL   CBC and Auto Differential   Result Value Ref Range    WBC 15.0 (H) 4.4 - 11.3 x10*3/uL    nRBC 0.0 0.0 - 0.0 /100 WBCs    RBC 3.24 (L) 4.50 - 5.90 x10*6/uL    Hemoglobin 8.1 (L) 13.5 - 17.5 g/dL    Hematocrit 24.7 (L) 41.0 - 52.0 %    MCV 76 (L) 80 - 100 fL    MCH 25.0 (L) 26.0 - 34.0 pg    MCHC 32.8 32.0 - 36.0 g/dL    RDW 15.4 (H) 11.5 - 14.5 %    Platelets 508 (H) 150 - 450 x10*3/uL    Neutrophils % 73.0 40.0 - 80.0 %    Immature Granulocytes %, Automated 1.3 (H) 0.0 - 0.9 %    Lymphocytes % 16.9 13.0 - 44.0 %    Monocytes % 8.4 2.0 - 10.0 %    Eosinophils % 0.3 0.0 - 6.0 %    Basophils % 0.1 0.0 - 2.0 %    Neutrophils Absolute 10.91 (H) 1.60 - 5.50 x10*3/uL    Immature Granulocytes Absolute, Automated 0.20 0.00 - 0.50 x10*3/uL    Lymphocytes Absolute 2.53 0.80 - 3.00 x10*3/uL    Monocytes Absolute 1.26 (H) 0.05 - 0.80 x10*3/uL    Eosinophils Absolute 0.04 0.00 - 0.40 x10*3/uL    Basophils Absolute 0.01 0.00 - 0.10 x10*3/uL   POCT GLUCOSE   Result Value Ref Range    POCT Glucose 131 (H) 74 - 99 mg/dL   POCT GLUCOSE   Result Value Ref Range    POCT Glucose 129 (H) 74 - 99 mg/dL   POCT GLUCOSE   Result Value Ref Range    POCT Glucose 177 (H) 74 - 99 mg/dL   POCT GLUCOSE   Result Value Ref Range    POCT Glucose 147 (H) 74 - 99 mg/dL   POCT GLUCOSE   Result Value Ref Range    POCT Glucose 162 (H) 74 - 99 mg/dL   POCT GLUCOSE   Result  Value Ref Range    POCT Glucose 154 (H) 74 - 99 mg/dL   Magnesium   Result Value Ref Range    Magnesium 2.64 (H) 1.60 - 2.40 mg/dL   Basic Metabolic Panel   Result Value Ref Range    Glucose 130 (H) 74 - 99 mg/dL    Sodium 132 (L) 136 - 145 mmol/L    Potassium 4.1 3.5 - 5.3 mmol/L    Chloride 96 (L) 98 - 107 mmol/L    Bicarbonate 27 21 - 32 mmol/L    Anion Gap 13 10 - 20 mmol/L    Urea Nitrogen 33 (H) 6 - 23 mg/dL    Creatinine 1.32 (H) 0.50 - 1.30 mg/dL    eGFR 50 (L) >60 mL/min/1.73m*2    Calcium 7.8 (L) 8.6 - 10.3 mg/dL   CBC and Auto Differential   Result Value Ref Range    WBC 13.5 (H) 4.4 - 11.3 x10*3/uL    nRBC 0.0 0.0 - 0.0 /100 WBCs    RBC 3.29 (L) 4.50 - 5.90 x10*6/uL    Hemoglobin 8.2 (L) 13.5 - 17.5 g/dL    Hematocrit 25.3 (L) 41.0 - 52.0 %    MCV 77 (L) 80 - 100 fL    MCH 24.9 (L) 26.0 - 34.0 pg    MCHC 32.4 32.0 - 36.0 g/dL    RDW 15.7 (H) 11.5 - 14.5 %    Platelets 552 (H) 150 - 450 x10*3/uL    Neutrophils % 70.1 40.0 - 80.0 %    Immature Granulocytes %, Automated 1.0 (H) 0.0 - 0.9 %    Lymphocytes % 19.5 13.0 - 44.0 %    Monocytes % 8.7 2.0 - 10.0 %    Eosinophils % 0.6 0.0 - 6.0 %    Basophils % 0.1 0.0 - 2.0 %    Neutrophils Absolute 9.43 (H) 1.60 - 5.50 x10*3/uL    Immature Granulocytes Absolute, Automated 0.14 0.00 - 0.50 x10*3/uL    Lymphocytes Absolute 2.63 0.80 - 3.00 x10*3/uL    Monocytes Absolute 1.17 (H) 0.05 - 0.80 x10*3/uL    Eosinophils Absolute 0.08 0.00 - 0.40 x10*3/uL    Basophils Absolute 0.02 0.00 - 0.10 x10*3/uL   POCT GLUCOSE   Result Value Ref Range    POCT Glucose 131 (H) 74 - 99 mg/dL   POCT GLUCOSE   Result Value Ref Range    POCT Glucose 173 (H) 74 - 99 mg/dL   POCT GLUCOSE   Result Value Ref Range    POCT Glucose 144 (H) 74 - 99 mg/dL   POCT GLUCOSE   Result Value Ref Range    POCT Glucose 177 (H) 74 - 99 mg/dL   Magnesium   Result Value Ref Range    Magnesium 2.53 (H) 1.60 - 2.40 mg/dL   Basic Metabolic Panel   Result Value Ref Range    Glucose 125 (H) 74 - 99 mg/dL     Sodium 136 136 - 145 mmol/L    Potassium 4.2 3.5 - 5.3 mmol/L    Chloride 100 98 - 107 mmol/L    Bicarbonate 27 21 - 32 mmol/L    Anion Gap 13 10 - 20 mmol/L    Urea Nitrogen 36 (H) 6 - 23 mg/dL    Creatinine 1.44 (H) 0.50 - 1.30 mg/dL    eGFR 45 (L) >60 mL/min/1.73m*2    Calcium 7.9 (L) 8.6 - 10.3 mg/dL   CBC and Auto Differential   Result Value Ref Range    WBC 12.9 (H) 4.4 - 11.3 x10*3/uL    nRBC 0.0 0.0 - 0.0 /100 WBCs    RBC 3.15 (L) 4.50 - 5.90 x10*6/uL    Hemoglobin 7.9 (L) 13.5 - 17.5 g/dL    Hematocrit 24.8 (L) 41.0 - 52.0 %    MCV 79 (L) 80 - 100 fL    MCH 25.1 (L) 26.0 - 34.0 pg    MCHC 31.9 (L) 32.0 - 36.0 g/dL    RDW 16.2 (H) 11.5 - 14.5 %    Platelets 524 (H) 150 - 450 x10*3/uL    Neutrophils % 69.9 40.0 - 80.0 %    Immature Granulocytes %, Automated 1.2 (H) 0.0 - 0.9 %    Lymphocytes % 19.4 13.0 - 44.0 %    Monocytes % 8.0 2.0 - 10.0 %    Eosinophils % 1.3 0.0 - 6.0 %    Basophils % 0.2 0.0 - 2.0 %    Neutrophils Absolute 9.05 (H) 1.60 - 5.50 x10*3/uL    Immature Granulocytes Absolute, Automated 0.15 0.00 - 0.50 x10*3/uL    Lymphocytes Absolute 2.51 0.80 - 3.00 x10*3/uL    Monocytes Absolute 1.03 (H) 0.05 - 0.80 x10*3/uL    Eosinophils Absolute 0.17 0.00 - 0.40 x10*3/uL    Basophils Absolute 0.02 0.00 - 0.10 x10*3/uL   SST TOP   Result Value Ref Range    Extra Tube Hold for add-ons.    POCT GLUCOSE   Result Value Ref Range    POCT Glucose 129 (H) 74 - 99 mg/dL   POCT GLUCOSE   Result Value Ref Range    POCT Glucose 196 (H) 74 - 99 mg/dL        XR chest 1 view   Final Result   1.  Irregular interstitial thickening with patchy multifocal   infiltrates greatest in the perihilar regions and lung bases may be   due to multifocal infection and/or edema. Small effusions not   excluded.                  MACRO:   None.        Signed by: Rey Anthony 2/4/2025 8:46 AM   Dictation workstation:   [id number]      FL modified barium swallow study   Final Result   No observed laryngeal penetration or  aspiration.        Please see speech pathology section of the report for additional   details and recommendations.        MACRO:   None        Signed by: Rey Anthony 2/3/2025 1:48 PM   Dictation workstation:   [id number]      XR chest 1 view   Final Result   Poor definition of the left hemidiaphragm suggesting left lower lobe   consolidation. Coarse lung markings.        Enlarged cardiac silhouette        MACRO:   none        Signed by: Awa Connell 2/3/2025 8:04 AM   Dictation workstation:   [id number]      XR chest 1 view   Final Result   Unchanged bibasilar atelectasis.        MACRO:   None        Signed by: Omaira Santamaria 2/2/2025 11:32 AM   Dictation workstation:   [id number]       renal complete   Final Result   No hydronephrosis. Chronic medical renal disease.             MACRO:   None        Signed by: Joseph Schoenberger 1/31/2025 4:11 PM   Dictation workstation:   [id number]      CT chest w IV contrast   Final Result   1.  Bilateral dependent lower lobe atelectasis   2. 2.4 cm pleural-based ground-glass opacity right upper lobe is new.   Consider follow up non contrast chest CT at 6-12 months to confirm   persistence, then consider CT chest every 2 years until 5   years.(Gary MacMahowong et al., Guidelines for management of incidental   pulmonary nodules detected on CT images: From the [employer]ischner Society   2017, Radiology. 2017 Jul;284 (1):228-243.) [email or web address].[email or web address]        Signed by: Merline Quinn 1/31/2025 2:20 PM   Dictation workstation:   [id number]      Transthoracic Echo (TTE) Complete   Final Result      XR chest 1 view   Final Result   Bibasilar airspace opacities, as above. Clinical correlation and   continued follow-up until clearing is recommended.        MACRO:   None.        Signed by: Darrion Crowley 1/27/2025 2:37 PM   Dictation workstation:   [id number]      CT head wo IV contrast   Final Result   No evidence of acute cortical infarct or intracranial hemorrhage.         MACRO:   None             Signed by: Darrion Crowley 1/27/2025 2:36 PM   Dictation workstation:   [id number]          Transthoracic Echo (TTE) Complete    Result Date: 1/30/2025          [place] [address]  Tel [phone] Fax [phone] TRANSTHORACIC ECHOCARDIOGRAM REPORT Patient Name:       MCKENZIE KILLIAN FILIBERTO     Reading Physician:    [id number] Michael Felipe DO Study Date:         1/30/2025           Ordering Provider:    [id number] NIKHIL BANEGAS MRN/PID:            [id number]            Fellow: Accession#:         [id number]        Nurse: Date of Birth/Age:  1/25/1930 / 89      Sonographer:          Sisi MCMULLEN Gender Assigned at                     Additional Staff: Birth: Height:             187.96 cm           Admit Date:           1/27/2025 Weight:             99.79 kg            Admission Status:     Inpatient -                                                               Routine BSA / BMI:          2.26 m2 / 28.25     Department Location:  [place] [address]                                     Echo Lab Blood Pressure: 116 /74 mmHg Study Type:    TRANSTHORACIC ECHO (TTE) COMPLETE Diagnosis/ICD: Shortness of breath-R06.02 Indication:    SOB, PNEUMONIA CPT Codes:     Echo Complete w Full [id number] Patient History: Diabetes:          Yes Pertinent History: Hyperlipidemia, COLE and Dyspnea. Study Detail: The following Echo studies were performed: 2D, M-Mode, Doppler and               color flow. Technically challenging study due to prominent lung               artifact. Definity used as a contrast agent for endocardial border               definition. Total contrast used for this procedure was 2 mL via IV               push. The patient was awake.  PHYSICIAN  INTERPRETATION: Left Ventricle: The left ventricular systolic function is moderately decreased, with a visually estimated ejection fraction of 35%. There is global hypokinesis of the left ventricle with minor regional variations. The left ventricular cavity size is normal. There is mild increased septal and mildly increased posterior left ventricular wall thickness. Spectral Doppler shows a Grade II (pseudonormal pattern) of left ventricular diastolic filling with an elevated left atrial pressure. Left Atrium: The left atrial size is mildly dilated. Right Ventricle: The right ventricle is upper limits of normal in size. There is normal right ventricular global systolic function. Right Atrium: The right atrial size is mildly dilated. Aortic Valve: The aortic valve appears structurally normal. There is mild to moderate aortic valve cusp calcification. There is evidence of mild aortic valve stenosis. The aortic valve dimensionless index is 0.53. There is no evidence of aortic valve regurgitation. The peak instantaneous gradient of the aortic valve is 9 mmHg. The mean gradient of the aortic valve is 4 mmHg. Mitral Valve: The mitral valve is normal in structure. There is no evidence of mitral valve stenosis. There is normal mitral valve leaflet mobility. There is mild mitral annular calcification. There is mild mitral valve regurgitation. Tricuspid Valve: The tricuspid valve is structurally normal. There is normal tricuspid valve leaflet mobility. There is mild tricuspid regurgitation. Pulmonic Valve: The pulmonic valve is structurally normal. There is no indication of pulmonic valve regurgitation. Pericardium: Trivial to small pericardial effusion. The effusion is circumferential. Aorta: The aortic root is normal. Pulmonary Artery: Pulmonary hypertension is present. The tricuspid regurgitant velocity is 3.88 m/s, and with an estimated right atrial pressure of 15 mmHg, the estimated pulmonary artery pressure is severely  elevated with the RVSP at 75.2 mmHg. Systemic Veins: The inferior vena cava appears mildly dilated. In comparison to the previous echocardiogram(s): There are no prior studies on this patient for comparison purposes.  CONCLUSIONS:  1. The left ventricular systolic function is moderately decreased, with a visually estimated ejection fraction of 35%.  2. There is global hypokinesis of the left ventricle with minor regional variations.  3. Spectral Doppler shows a Grade II (pseudonormal pattern) of left ventricular diastolic filling with an elevated left atrial pressure.  4. There is normal right ventricular global systolic function.  5. Right ventricle is upper limits of normal in size.  6. There is no evidence of mitral valve stenosis.  7. Mild mitral valve regurgitation.  8. Mild tricuspid regurgitation is visualized.  9. Mild aortic valve stenosis. 10. Pulmonary hypertension is present. 11. Severely elevated pulmonary artery pressure. 12. The inferior vena cava appears mildly dilated. RECOMMENDATIONS: Technically suboptimal and limited study, therefore accuracy of above interpretation could be substantially diminished. Clinical correlation is advised. Consider additional imaging modalities if clinically indicated.  QUANTITATIVE DATA SUMMARY:  2D MEASUREMENTS:             Normal Ranges: Ao Root d:       3.70 cm     (2.0-3.7cm) LAs:             4.60 cm     (2.7-4.0cm) IVSd:            1.00 cm     (0.6-1.1cm) LVPWd:           0.90 cm     (0.6-1.1cm) LVIDd:           5.80 cm     (3.9-5.9cm) LVIDs:           4.80 cm LV Mass Index:   96.4 g/m2 LVEDV Index:     98.41 ml/m2 LV % FS          17.2 %  LEFT ATRIUM:                  Normal Ranges: LA Vol A4C:        91.5 ml    (22+/-6mL/m2) LA Vol A2C:        94.8 ml LA Vol BP:         97.9 ml LA Vol Index A4C:  40.4ml/m2 LA Vol Index A2C:  41.9 ml/m2 LA Vol Index BP:   43.3 ml/m2 LA Area A4C:       28.3 cm2 LA Area A2C:       27.4 cm2 LA Major Axis A4C: 7.4 cm LA Major Axis  A2C: 6.7 cm LA Volume Index:   41.2 ml/m2  RIGHT ATRIUM:                 Normal Ranges: RA Vol A4C:        58.1 ml    (8.3-19.5ml) RA Vol Index A4C:  25.7 ml/m2 RA Area A4C:       21.8 cm2 RA Major Axis A4C: 7.0 cm  AORTA MEASUREMENTS:         Normal Ranges: Asc Ao, d:          3.10 cm (2.1-3.4cm)  LV SYSTOLIC FUNCTION:                      Normal Ranges: EF-A4C View:    40 % (>=55%) EF-A2C View:    36 % EF-Biplane:     38 % EF-Visual:      35 % LV EF Reported: 35 %  LV DIASTOLIC FUNCTION:           Normal Ranges: MV Peak E:             1.32 m/s  (0.7-1.2 m/s) MV Peak A:             0.91 m/s  (0.42-0.7 m/s) E/A Ratio:             1.45      (1.0-2.2) MV e'                  0.100 m/s (>8.0) MV lateral e'          0.12 m/s MV medial e'           0.08 m/s E/e' Ratio:            13.19     (<8.0)  MITRAL VALVE:          Normal Ranges: MV DT:        121 msec (150-240msec)  AORTIC VALVE:                     Normal Ranges: AoV Vmax:                1.53 m/s (<=1.7m/s) AoV Peak P.4 mmHg (<20mmHg) AoV Mean P.0 mmHg (1.7-11.5mmHg) LVOT Max Benja:            0.82 m/s (<=1.1m/s) AoV VTI:                 28.90 cm (18-25cm) LVOT VTI:                15.40 cm LVOT Diameter:           2.00 cm  (1.8-2.4cm) AoV Area, VTI:           1.67 cm2 (2.5-5.5cm2) AoV Area,Vmax:           1.67 cm2 (2.5-4.5cm2) AoV Dimensionless Index: 0.53  RIGHT VENTRICLE: RV Basal 3.90 cm RV Mid   3.40 cm RV Major 9.2 cm TAPSE:   20.7 mm RV s'    0.18 m/s  TRICUSPID VALVE/RVSP:          Normal Ranges: Peak TR Velocity:     3.88 m/s RV Syst Pressure:     75 mmHg  (< 30mmHg) IVC Diam:             1.90 cm  PULMONIC VALVE:          Normal Ranges: PV Accel Time:  92 msec  (>120ms) PV Max Benja:     1.0 m/s  (0.6-0.9m/s) PV Max P.0 mmHg  [id number] Michael Felipe DO Electronically signed on 2025 at 10:14:55 AM  ** Final **      DISCHARGE MEDICATIONS        Your medication list        START taking these medications         Instructions Last Dose Given Next Dose Due   amoxicillin-pot clavulanate 875-125 mg tablet  Commonly known as: Augmentin      Take 1 tablet by mouth every 12 hours for 3 doses.       guaiFENesin 600 mg 12 hr tablet  Commonly known as: Mucinex      Take 1 tablet (600 mg) by mouth 2 times a day for 5 days. Do not crush, chew, or split.       ipratropium-albuteroL 0.5-2.5 mg/3 mL nebulizer solution  Commonly known as: Duo-Neb      Take 3 mL by nebulization 3 times a day.       isosorbide mononitrate ER 60 mg 24 hr tablet  Commonly known as: Imdur  Start taking on: February 8, 2025      Take 1 tablet (60 mg) by mouth once daily. Do not crush or chew.       metoprolol succinate XL 25 mg 24 hr tablet  Commonly known as: Toprol-XL  Start taking on: February 8, 2025      Take 1 tablet (25 mg) by mouth once daily. Do not crush or chew.       oxygen gas therapy  Commonly known as: O2      Inhale 1 each once every 24 hours.       QUEtiapine 25 mg tablet  Commonly known as: SEROquel      Take 1 tablet (25 mg) by mouth as needed at bedtime (agitation).       torsemide 10 mg tablet  Commonly known as: Demadex      Take 1 tablet (10 mg) by mouth once daily.              CONTINUE taking these medications        Instructions Last Dose Given Next Dose Due   albuterol 90 mcg/actuation inhaler  Commonly known as: Ventolin HFA      Inhale 2 puffs every 4 hours if needed for wheezing or shortness of breath.       atorvastatin 40 mg tablet  Commonly known as: Lipitor      TAKE 1 TABLET BY MOUTH EVERY DAY       Cepacol Sore Throat (lyndsey-men) 15-2.3 mg lozenge  Generic drug: benzocaine-menthoL           Eliquis 5 mg tablet  Generic drug: apixaban      Take 1 tablet (5 mg) by mouth 2 times a day.       Eliquis DVT-PE Treat 30D Start 5 mg (74 tabs) tablet  Generic drug: apixaban           multivitamin capsule           tamsulosin 0.4 mg 24 hr capsule  Commonly known as: Flomax      TAKE 2 CAPSULES BY MOUTH ONCE DAILY       traZODone 50 mg  tablet  Commonly known as: Desyrel      TAKE 1 TABLET (50 MG) BY MOUTH ONCE DAILY AT BEDTIME.       traZODone 100 mg tablet  Commonly known as: Desyrel      TAKE 1 TABLET (100 MG) BY MOUTH AS NEEDED AT BEDTIME FOR SLEEP.                 Where to Get Your Medications        These medications were sent to [facility]pharmacy #[id number] - [place], [place] - [address] AT CORNER OF [address] [address]      Phone: [phone]   amoxicillin-pot clavulanate 875-125 mg tablet  traZODone 100 mg tablet       Information about where to get these medications is not yet available    Ask your nurse or doctor about these medications  guaiFENesin 600 mg 12 hr tablet  ipratropium-albuteroL 0.5-2.5 mg/3 mL nebulizer solution  isosorbide mononitrate ER 60 mg 24 hr tablet  metoprolol succinate XL 25 mg 24 hr tablet  oxygen gas therapy  QUEtiapine 25 mg tablet  torsemide 10 mg tablet         OUTPATIENT FOLLOW-UP     Future Appointments   Date Time Provider Department Center   2/18/2025 11:30 AM Pierce Chambers MD [id number] [place]   2/25/2025 10:45 AM Venkata Stubbs MD [id number] [place]

## 2025-02-07 NOTE — CARE PLAN
Problem: Safety - Adult  Goal: Free from fall injury  Outcome: Progressing     Problem: Discharge Planning  Goal: Discharge to home or other facility with appropriate resources  Outcome: Progressing     Problem: Chronic Conditions and Co-morbidities  Goal: Patient's chronic conditions and co-morbidity symptoms are monitored and maintained or improved  Outcome: Progressing     Problem: Nutrition  Goal: Nutrient intake appropriate for maintaining nutritional needs  Outcome: Progressing     Problem: Diabetes  Goal: Achieve decreasing blood glucose levels by end of shift  Outcome: Progressing  Goal: Increase stability of blood glucose readings by end of shift  Outcome: Progressing  Goal: Decrease in ketones present in urine by end of shift  Outcome: Progressing  Goal: Maintain electrolyte levels within acceptable range throughout shift  Outcome: Progressing  Goal: Maintain glucose levels >70mg/dl to <250mg/dl throughout shift  Outcome: Progressing  Goal: No changes in neurological exam by end of shift  Outcome: Progressing  Goal: Learn about and adhere to nutrition recommendations by end of shift  Outcome: Progressing  Goal: Vital signs within normal range for age by end of shift  Outcome: Progressing  Goal: Increase self care and/or family involovement by end of shift  Outcome: Progressing  Goal: Receive DSME education by end of shift  Outcome: Progressing     Problem: Fall/Injury  Goal: Not fall by end of shift  Outcome: Progressing  Goal: Be free from injury by end of the shift  Outcome: Progressing  Goal: Verbalize understanding of personal risk factors for fall in the hospital  Outcome: Progressing  Goal: Verbalize understanding of risk factor reduction measures to prevent injury from fall in the home  Outcome: Progressing  Goal: Use assistive devices by end of the shift  Outcome: Progressing  Goal: Pace activities to prevent fatigue by end of the shift  Outcome: Progressing     Problem: Pain  Goal: Takes deep  breaths with improved pain control throughout the shift  Outcome: Progressing  Goal: Turns in bed with improved pain control throughout the shift  Outcome: Progressing  Goal: Walks with improved pain control throughout the shift  Outcome: Progressing  Goal: Performs ADL's with improved pain control throughout shift  Outcome: Progressing  Goal: Participates in PT with improved pain control throughout the shift  Outcome: Progressing  Goal: Free from opioid side effects throughout the shift  Outcome: Progressing  Goal: Free from acute confusion related to pain meds throughout the shift  Outcome: Progressing     Problem: Skin  Goal: Decreased wound size/increased tissue granulation at next dressing change  Outcome: Progressing  Goal: Participates in plan/prevention/treatment measures  Outcome: Progressing  Goal: Prevent/manage excess moisture  Outcome: Progressing  Goal: Prevent/minimize sheer/friction injuries  Outcome: Progressing  Goal: Promote/optimize nutrition  Outcome: Progressing  Goal: Promote skin healing  Outcome: Progressing   The patient's goals for the shift include rest    The clinical goals for the shift include Patient will remain HDS throughout shift

## 2025-02-07 NOTE — PROGRESS NOTES
Physical Therapy    Physical Therapy Treatment    Patient Name: Chris Russell  MRN: [id number]  Today's Date: 2/7/2025  Time Calculation  Start Time: 0833  Stop Time: 0900  Time Calculation (min): 27 min     1001/1001-A    Assessment/Plan   PT Assessment  PT Assessment Results: Decreased endurance, Impaired balance, Decreased mobility  Rehab Prognosis: Good  Barriers to Discharge Home: No anticipated barriers  Treatment Tolerance: Patient limited by fatigue (Patient limited by weakness.)  Medical Staff Made Aware: Yes  Strengths: Support of Caregivers, Living arrangement secure, Housing layout  Barriers to Participation: Comorbidities  End of Session Communication: Bedside nurse, PCT/NA/CTA  Assessment Comment: Patient continues to require (A) for safety with transfers/amb. Patient will benefit from additional PT to address deficits and improve mobility.  End of Session Patient Position: Up in chair, Alarm off, not on at start of session (Set up with breakfast; Call light, phone, and tray table within reach.)  PT Plan  Inpatient/Swing Bed or Outpatient: Inpatient  Treatment/Interventions: Bed mobility, Transfer training  PT Plan: Ongoing PT  PT Frequency: 3 times per week  PT Discharge Recommendations: Low intensity level of continued care    PT Recommended Transfer Status: Assist x1-2, ww+gait belt    General Visit Information:   PT  Visit  PT Received On: 02/07/25  General  Family/Caregiver Present: No  Prior to Session Communication: Bedside nurse, PCT/NA/CTA  Patient Position Received: Bed, 3 rail up, Alarm on  General Comment: Pleasant and cooperative. Eager to get OOB.    General Observations:   General Observation: Tele; O2 via NC; External male catheter; Alarm.    Subjective     Precautions:  Precautions  Hearing/Visual Limitations: [place], B/L hearing aides.  Medical Precautions: Fall precautions  Precautions Comment: Per EMR: High fall risk    Vital Signs:  Vital Signs  Heart Rate:  (76bpm)  SpO2:  (O2 via  NC. SPO2 95% during session.)    Objective     Pain:  Pain Assessment  Pain Assessment: 0-10  0-10 (Numeric) Pain Score: 0 - No pain    Cognition:  Cognition  Overall Cognitive Status: Within Functional Limits (Delayed responses at times 2° difficulty hearing.)  Processing Speed: Delayed    Balance:   Static Sitting Balance  Static Sitting-Comment/Number of Minutes: F+  Dynamic Sitting Balance  Dynamic Sitting-Comments: F  Static Standing Balance  Static Standing-Comment/Number of Minutes: F- with ww  Dynamic Standing Balance  Dynamic Standing-Comments: F- with ww/P+ without AD    Treatments:           Bed Mobility  Bed Mobility: Yes  Bed Mobility 1  Bed Mobility 1: Supine to sitting  Level of Assistance 1: Minimum assistance  Bed Mobility Comments 1: HOB ~40°. Hand over hand (A) to reach across body to use the bed rail for support. (A) to lift UB from HOB while moving LEs over the EOB. Denies dizziness with positional change.  Ambulation/Gait Training  Ambulation/Gait Training Performed: Yes  Ambulation/Gait Training 1  Surface 1: Level tile  Device 1: Rolling walker  Gait Support Devices: Gait belt  Assistance 1: Minimum assistance, Moderate assistance  Comments/Distance (ft) 1: Bed to BSC Min(A) with ww and BSC to Chair Mod(A) without ww(pt pushed it aside to take steps without UE support). Forward flexed posture. WBOS. Decreased step height/length B. v/c and (A) for safer use of ww with transfer training.  Transfers  Transfer: Yes  Transfer 1  Technique 1: Sit to stand, Stand to sit  Transfer Device 1: Gait belt (ww)  Transfer Level of Assistance 1: Moderate assistance, +2  Trials/Comments 1: (3x). v/c for safe hand placement and technique; inconsistent follow through, but receptive to cues for corrections. Slow transition of hands to/from ww. Benefits from elevated surfaces. Retropulsive initially when standing up.             Outcome Measures:     [facility] Basic Mobility  Turning from your back to your side while  in a flat bed without using bedrails: A lot  Moving from lying on your back to sitting on the side of a flat bed without using bedrails: A lot  Moving to and from bed to chair (including a wheelchair): A little  Standing up from a chair using your arms (e.g. wheelchair or bedside chair): A lot  To walk in hospital room: A little  Climbing 3-5 steps with railing: Total  Basic Mobility - Total Score: 13                                      Education Documentation  Mobility Training, taught by Brianne Alvarado PTA at 2/7/2025  2:26 PM.  Learner: Patient  Readiness: Acceptance  Method: Explanation, Demonstration  Response: Verbalizes Understanding, Needs Reinforcement  Comment: See therapy note.         EDUCATION:  Individual(s) Educated: Patient  Education Provided: Body Mechanics, Fall Risk, POC, Posture (Balance; Safety with bed mobility/transfers.)  Patient Response to Education: Patient/Caregiver Verbalized Understanding of Information (However, carry-over is limited.)    Encounter Problems       Encounter Problems (Active)       PT Problem       Pt. will transfer supine/sit with MOD I (Progressing)       Start:  01/28/25    Expected End:  02/11/25            Pt. will transfer sit/stand with safest and least restrictive assistive device with MOD I  (Progressing)       Start:  01/28/25    Expected End:  02/11/25            Pt.will ambulate 60'' with safest and least restrictive assistive device with MOD I  (Not Progressing)       Start:  01/28/25    Expected End:  02/11/25            Pt. will amb up/down 2 steps with HR and cane with CGA  (Not Progressing)       Start:  01/28/25    Expected End:  02/11/25            Pt. will perform 2 x 15 B LE AROM exercises  (Not Progressing)       Start:  01/28/25    Expected End:  02/11/25

## 2025-02-07 NOTE — CARE PLAN
The patient's goals for the shift include to be able to get home    The clinical goals for the shift include Patient will remain HDS throughout shift

## 2025-02-07 NOTE — PROGRESS NOTES
Speech-Language Pathology                 Therapy Communication Note    Patient Name: Chris Russell  MRN: [id number]  Department: [facility]  Room: [facility]-A  Today's Date: 2/7/2025     Discipline: Speech Language Pathology          Missed Visit Reason:  Attempted to see patient at lunch today.  He stated he was not hungry and was not eating his lunch.    Missed Time: Attempt    Comment: Patient's family at bedside.  They stated that he is not hungry currently because he ate a lot for his breakfast.  They stated they brought him a [employer] fish sandwich which he always likes but that he does not want it currently.  Speech therapy will attempt again at later time/date.

## 2025-02-07 NOTE — PROGRESS NOTES
"Renal Progress Note  Assessment:  89 y.o. yo male admitted with sob.  Found to have PNA and new CHF with ef 35% and pulm HTN.  Na 136 on admission but down to 118 when initial consult done.  Looks hypervolemic.  Sodium level was dropping even before he got lasix so cannot blame entirely on lasix.  On SSRI.  Urine sg 1.019 on admission.  NO hydronephrosis noted on renal ultrasound, has nml sized kidneys. Poor response to diuretics, tolvaptan and salt tabs. Only responded to 3% NS however due to c/f fluid overload put patient on NS at low rate. Has had significant improvement      Plan:  -Discussed with patient and son and  patient can be discharged from the kidney standpoint on 10 mg of torsemide daily    Subjective:   Admit Date: 1/27/2025    Interval History: Patient seen and examined uneventful night alert oriented follow command in no apparent pain or distress ready to be discharged      Medications:   Scheduled Meds:amoxicillin-pot clavulanate, 1 tablet, oral, q12h FREDDY  apixaban, 5 mg, oral, BID  atorvastatin, 40 mg, oral, Nightly  guaiFENesin, 600 mg, oral, BID  insulin lispro, 0-5 Units, subcutaneous, Before meals & nightly  ipratropium-albuteroL, 3 mL, nebulization, TID  isosorbide mononitrate ER, 60 mg, oral, Daily  metoprolol succinate XL, 25 mg, oral, Daily  [Held by provider] polyethylene glycol, 17 g, oral, Daily  [Held by provider] sennosides, 2 tablet, oral, Nightly  tamsulosin, 0.8 mg, oral, Daily  [Held by provider] valsartan, 40 mg, oral, Daily      Continuous Infusions:     CBC:   Lab Results   Component Value Date    HGB 7.9 (L) 02/07/2025    HGB 8.2 (L) 02/06/2025    WBC 12.9 (H) 02/07/2025    WBC 13.5 (H) 02/06/2025     (H) 02/07/2025     (H) 02/06/2025      Anemia:  No results found for: \"FERRITIN\", \"IRON\", \"TIBC\"   BMP:    Lab Results   Component Value Date     02/07/2025     (L) 02/06/2025    K 4.2 02/07/2025    K 4.1 02/06/2025     02/07/2025    CL " "96 (L) 02/06/2025    CO2 27 02/07/2025    CO2 27 02/06/2025    BUN 36 (H) 02/07/2025    BUN 33 (H) 02/06/2025    CREATININE 1.44 (H) 02/07/2025    CREATININE 1.32 (H) 02/06/2025      Bone disease: No results found for: \"PHOS\", \"PTH\", \"VITD25\"   Urinalysis:  No results found for: \"JACKELINE\", \"PROTUR\", \"GLUCOSEU\", \"BLOODU\", \"KETONESU\", \"BILIRUBINU\", \"NITRITEU\", \"LEUKOCYTESU\", \"UTPCR\"     Objective:   Vitals: /59 (BP Location: Right arm, Patient Position: Lying)   Pulse 71   Temp 36.2 °C (97.2 °F) (Temporal)   Resp 19   Ht 1.88 m (6' 2.02\")   Wt 107 kg (235 lb 14.3 oz)   SpO2 92%   BMI 30.27 kg/m²    Wt Readings from Last 3 Encounters:   02/07/25 107 kg (235 lb 14.3 oz)   11/12/24 97.5 kg (215 lb)   08/13/24 97.6 kg (215 lb 3.2 oz)      24HR INTAKE/OUTPUT:    Intake/Output Summary (Last 24 hours) at 2/7/2025 1222  Last data filed at 2/7/2025 1100  Gross per 24 hour   Intake 1020 ml   Output 1800 ml   Net -780 ml     Admission weight:  Weight: 99.8 kg (220 lb)      Constitutional:  Alert, awake, no apparent distress   Skin:normal, no rash  HEENT:sclera anicteric.  Head atraumatic normocephalic  Neck:supple with no thyromegally  Cardiovascular:  S1, S2 without m/r/g   Respiratory:  CTA B without w/r/r   Abdomen: +bs, soft, nt  Ext: no LE edema  Musculoskeletal:Intact  Neuro:Alert and oriented with no deficit      Electronically signed by Handy Schaffer MD on 2/7/2025 at 12:22 PM            "

## 2025-02-10 ENCOUNTER — NURSING HOME VISIT (OUTPATIENT)
Dept: POST ACUTE CARE | Facility: EXTERNAL LOCATION | Age: OVER 89
End: 2025-02-10
Payer: MEDICARE

## 2025-02-10 DIAGNOSIS — I50.21 ACUTE SYSTOLIC HEART FAILURE: ICD-10-CM

## 2025-02-10 DIAGNOSIS — Z79.4 CONTROLLED TYPE 2 DIABETES MELLITUS WITHOUT COMPLICATION, WITH LONG-TERM CURRENT USE OF INSULIN (MULTI): ICD-10-CM

## 2025-02-10 DIAGNOSIS — J18.9 COMMUNITY ACQUIRED PNEUMONIA, UNSPECIFIED LATERALITY: ICD-10-CM

## 2025-02-10 DIAGNOSIS — D64.89 ANEMIA DUE TO OTHER CAUSE, NOT CLASSIFIED: ICD-10-CM

## 2025-02-10 DIAGNOSIS — R41.0 CONFUSION: ICD-10-CM

## 2025-02-10 DIAGNOSIS — H91.93 BILATERAL HEARING LOSS, UNSPECIFIED HEARING LOSS TYPE: Primary | ICD-10-CM

## 2025-02-10 DIAGNOSIS — E11.9 CONTROLLED TYPE 2 DIABETES MELLITUS WITHOUT COMPLICATION, WITH LONG-TERM CURRENT USE OF INSULIN (MULTI): ICD-10-CM

## 2025-02-10 DIAGNOSIS — E87.70 HYPERVOLEMIA, UNSPECIFIED HYPERVOLEMIA TYPE: ICD-10-CM

## 2025-02-10 PROBLEM — I50.9 ACUTE HEART FAILURE: Status: ACTIVE | Noted: 2025-02-10

## 2025-02-10 PROCEDURE — 99309 SBSQ NF CARE MODERATE MDM 30: CPT

## 2025-02-10 NOTE — ASSESSMENT & PLAN NOTE
He has hearing aids that he uses however he is still very hard of hearing with them in place.

## 2025-02-10 NOTE — LETTER
Patient: Chris Russell  : 1930    Encounter Date: 02/10/2025    Visit  Note   Subjective  Chris Russell is a 89 y.o. male who is being seen at [facility] and evaluated for multiple medical problems. Nursing notes, vital signs, and labs were reviewed in the local facility chart. Orders and medications were reviewed on the local chart.  No chief complaint on file.     This is a 89-year-old male with a past medical history of PE (on Eliquis), diabetes type 2, dyslipidemia, and BPH who was hospitalized  through  for acute hypoxic respiratory failure secondary to pneumonia.  His stay was complicated by acute heart failure with preserved ejection fraction, hyponatremia and hypervolemia.  Cardiology evaluated the patient however he declined any further invasive evaluation, nephrology evaluated the patient as well treating his hyponatremia with hypertonic solution then tolvaptan and lasix PRN and he was recommended to finish his 7-day course of Augmentin for pneumonia treatment.  He is recommended to continue bronchial hygiene and wean off of oxygen as tolerated.  he was started on Toprol and cardiology discontinued his valsartan. Overall prognosis was discussed while he was hospitalized with family and is poor. Palliative is following and depending on how he does here, he and his family are receptive to hospice care.     On examination today he has no compliants. He is very hard of hearing but denies any chest pain, shortness of breath, or problems with bowel or bladder. His daughter and son are both here during examination and present for discussion today regarding his meidcal care.          Objective  There were no vitals taken for this visit.  Physical Exam  Vitals reviewed.   Constitutional:       Appearance: Normal appearance.   HENT:      Head: Normocephalic.   Cardiovascular:      Rate and Rhythm: Normal rate and regular rhythm.   Pulmonary:      Effort: Pulmonary effort is normal.  No respiratory distress.      Breath sounds: No stridor. Rhonchi present. No wheezing or rales.      Comments: +wet productive cough  Musculoskeletal:      Cervical back: Neck supple.      Right lower leg: Edema present.      Left lower leg: Edema present.      Comments: BLE +4 weeping edema; LLE skin tear anterior tibia no excessive warmth or erythema or ecchymosis; skin is warm  +2 DP pulses   Skin:     General: Skin is warm and dry.   Neurological:      Mental Status: He is alert and oriented to person, place, and time.   Psychiatric:         Mood and Affect: Mood normal.         Behavior: Behavior normal.         Assessment & Plan  Bilateral hearing loss, unspecified hearing loss type  He has hearing aids that he uses however he is still very hard of hearing with them in place.          Community acquired pneumonia, unspecified laterality  He is recommended to continue his 7 day course of augmentin and plans to continue bronchial hygiene here.          Controlled type 2 diabetes mellitus without complication, with long-term current use of insulin (Multi)  Lab Results   Component Value Date    HGBA1C 6.3 (H) 12/20/2024     Diet controlled at this time         Hypervolemia, unspecified hypervolemia type  Nephrology evaluated and he is being treated with torsemide 10mg daily for this. We will continue wrapping to help reduce swelling and protect skin and continue with torsemide for now;        Anemia due to other cause, not classified  Unknown current cause; microcytic hypochromic; iron panel ordered for tomorrow         Confusion  Family is reporting some confusion in the morning hours for him; he is a/o x 3 today and is reporting doing fairly well. This may be temporary after hospitalization and related to the hospital stay. Cannot rule out hyponatremia until his labs are back he did have labs drawn this morning.          Acute systolic heart failure  He was started on a beta blocker, isosorbide and recommended  to continue with torsemide upon discharge for leg edema. His valsartan was discontinued. We will continue these medications and monitor his status with daily weights. Added fluid restriction 1500 mL.        discussed his treatment plan with him and his family today. He was previously residing with his daughter and plans to return there eventually with hospice support if needed.     Time for coordination of care was greater than 35 minutes [facility] chart review, visit and exam, discussion with nursing, therapy and/or social service staff.       Please excuse any errors in grammar or translation related to this dictation. Voice recognition software was utilized to prepare this document.       Electronically Signed By: CHELLY Clinton   2/10/25  4:40 PM

## 2025-02-10 NOTE — ASSESSMENT & PLAN NOTE
Lab Results   Component Value Date    HGBA1C 6.3 (H) 12/20/2024     Diet controlled at this time

## 2025-02-10 NOTE — ASSESSMENT & PLAN NOTE
Family is reporting some confusion in the morning hours for him; he is a/o x 3 today and is reporting doing fairly well. This may be temporary after hospitalization and related to the hospital stay. Cannot rule out hyponatremia until his labs are back he did have labs drawn this morning.

## 2025-02-10 NOTE — PROGRESS NOTES
Visit  Note   Subjective   Chris Russell is a 89 y.o. male who is being seen at [facility] and evaluated for multiple medical problems. Nursing notes, vital signs, and labs were reviewed in the local facility chart. Orders and medications were reviewed on the local chart.  No chief complaint on file.     This is a 89-year-old male with a past medical history of PE (on Eliquis), diabetes type 2, dyslipidemia, and BPH who was hospitalized January 27 through February 7 for acute hypoxic respiratory failure secondary to pneumonia.  His stay was complicated by acute heart failure with preserved ejection fraction, hyponatremia and hypervolemia.  Cardiology evaluated the patient however he declined any further invasive evaluation, nephrology evaluated the patient as well treating his hyponatremia with hypertonic solution then tolvaptan and lasix PRN and he was recommended to finish his 7-day course of Augmentin for pneumonia treatment.  He is recommended to continue bronchial hygiene and wean off of oxygen as tolerated.  he was started on Toprol and cardiology discontinued his valsartan. Overall prognosis was discussed while he was hospitalized with family and is poor. Palliative is following and depending on how he does here, he and his family are receptive to hospice care.     On examination today he has no compliants. He is very hard of hearing but denies any chest pain, shortness of breath, or problems with bowel or bladder. His daughter and son are both here during examination and present for discussion today regarding his meidcal care.          Objective   There were no vitals taken for this visit.  Physical Exam  Vitals reviewed.   Constitutional:       Appearance: Normal appearance.   HENT:      Head: Normocephalic.   Cardiovascular:      Rate and Rhythm: Normal rate and regular rhythm.   Pulmonary:      Effort: Pulmonary effort is normal. No respiratory distress.      Breath sounds: No stridor. Rhonchi  present. No wheezing or rales.      Comments: +wet productive cough  Musculoskeletal:      Cervical back: Neck supple.      Right lower leg: Edema present.      Left lower leg: Edema present.      Comments: BLE +4 weeping edema; LLE skin tear anterior tibia no excessive warmth or erythema or ecchymosis; skin is warm  +2 DP pulses   Skin:     General: Skin is warm and dry.   Neurological:      Mental Status: He is alert and oriented to person, place, and time.   Psychiatric:         Mood and Affect: Mood normal.         Behavior: Behavior normal.         Assessment & Plan  Bilateral hearing loss, unspecified hearing loss type  He has hearing aids that he uses however he is still very hard of hearing with them in place.          Community acquired pneumonia, unspecified laterality  He is recommended to continue his 7 day course of augmentin and plans to continue bronchial hygiene here.          Controlled type 2 diabetes mellitus without complication, with long-term current use of insulin (Multi)  Lab Results   Component Value Date    HGBA1C 6.3 (H) 12/20/2024     Diet controlled at this time         Hypervolemia, unspecified hypervolemia type  Nephrology evaluated and he is being treated with torsemide 10mg daily for this. We will continue wrapping to help reduce swelling and protect skin and continue with torsemide for now;        Anemia due to other cause, not classified  Unknown current cause; microcytic hypochromic; iron panel ordered for tomorrow         Confusion  Family is reporting some confusion in the morning hours for him; he is a/o x 3 today and is reporting doing fairly well. This may be temporary after hospitalization and related to the hospital stay. Cannot rule out hyponatremia until his labs are back he did have labs drawn this morning.          Acute systolic heart failure  He was started on a beta blocker, isosorbide and recommended to continue with torsemide upon discharge for leg edema. His  valsartan was discontinued. We will continue these medications and monitor his status with daily weights. Added fluid restriction 1500 mL.        discussed his treatment plan with him and his family today. He was previously residing with his daughter and plans to return there eventually with hospice support if needed.     Time for coordination of care was greater than 35 minutes [place] chart review, visit and exam, discussion with nursing, therapy and/or social service staff.       Please excuse any errors in grammar or translation related to this dictation. Voice recognition software was utilized to prepare this document.

## 2025-02-10 NOTE — ASSESSMENT & PLAN NOTE
Nephrology evaluated and he is being treated with torsemide 10mg daily for this. We will continue wrapping to help reduce swelling and protect skin and continue with torsemide for now;

## 2025-02-10 NOTE — ASSESSMENT & PLAN NOTE
He is recommended to continue his 7 day course of augmentin and plans to continue bronchial hygiene here.

## 2025-02-10 NOTE — ASSESSMENT & PLAN NOTE
He was started on a beta blocker, isosorbide and recommended to continue with torsemide upon discharge for leg edema. His valsartan was discontinued. We will continue these medications and monitor his status with daily weights. Added fluid restriction 1500 mL.        discussed his treatment plan with him and his family today. He was previously residing with his daughter and plans to return there eventually with hospice support if needed.

## 2025-02-11 ENCOUNTER — NURSING HOME VISIT (OUTPATIENT)
Dept: POST ACUTE CARE | Facility: EXTERNAL LOCATION | Age: OVER 89
End: 2025-02-11
Payer: MEDICARE

## 2025-02-11 DIAGNOSIS — J18.9 COMMUNITY ACQUIRED PNEUMONIA, UNSPECIFIED LATERALITY: Primary | ICD-10-CM

## 2025-02-11 DIAGNOSIS — D64.89 ANEMIA DUE TO OTHER CAUSE, NOT CLASSIFIED: ICD-10-CM

## 2025-02-11 DIAGNOSIS — L03.115 CELLULITIS OF RIGHT ANKLE: ICD-10-CM

## 2025-02-11 DIAGNOSIS — E11.65 TYPE 2 DIABETES MELLITUS WITH HYPERGLYCEMIA, WITHOUT LONG-TERM CURRENT USE OF INSULIN: ICD-10-CM

## 2025-02-11 DIAGNOSIS — I50.22 CHRONIC SYSTOLIC HEART FAILURE: ICD-10-CM

## 2025-02-11 PROCEDURE — 99306 1ST NF CARE HIGH MDM 50: CPT | Performed by: FAMILY MEDICINE

## 2025-02-11 NOTE — ASSESSMENT & PLAN NOTE
Anemia was identified in the hospital with hemoglobin of 7.9.  Repeat hemoglobin is 7.7 yesterday although there is no sign of any bleeding.  This is relatively stable but iron studies are pending to see if iron deficiency is contributing to his anemia.

## 2025-02-11 NOTE — ASSESSMENT & PLAN NOTE
Overall clinical picture is very concerning for cellulitis of the right medial ankle.  This is despite a current course of oral Augmentin.  I had a ye discussion with the patient and his family that this would indicate a likely more resistant infection and he may require hospitalization and consultation with infectious disease.  For now since we are considering palliative and possibly hospice care we will try aggressive treatment in this facility with the addition of oral Bactrim to cover MRSA bacteria.  If there is no improvement in the redness and swelling then we would either need to send him back to the hospital for intravenous medication and ID consult or consider moving forward with hospice care consult.

## 2025-02-11 NOTE — LETTER
Patient: Chris Russell  : 1930    Encounter Date: 2025    Admission H&P  Subjective  Chris Russell is a 89 y.o. male who is being seen for an admission H&P at [facility].  Nursing notes, vital signs, and labs were reviewed in the local facility chart and he  is being evaluated for multiple medical problems. Orders and medications were reviewed on the local chart.  HPI   This 89-year-old male was hospitalized  through  for acute respiratory failure secondary to pneumonia.  His recovery was complicated by acute heart failure on chronic heart failure, hyponatremia, hypervolemia and severe edema throughout.  He was evaluated by cardiology but declined any further invasive evaluation.  He was treated with hypertonic saline and then tolva[tan with Lasix as needed.  He was recommended to finish a 7-day course of Augmentin for the pneumonia and wean off oxygen as tolerated.  Overall prognosis was reportedly discussed with the family and felt to be very poor.  Palliative care was consulted and there was a discussion of considering hospice care in the future.  His son and family are present at the time of this examination.    Nursing staff also notes that today there is a new red puffy area on the right ankle.  He is not complaining of pain in this area and there is no fever or chills  Objective  There were no vitals taken for this visit.  Physical Exam  Constitutional:       Appearance: Normal appearance.   HENT:      Head: Normocephalic.   Eyes:      Conjunctiva/sclera: Conjunctivae normal.   Cardiovascular:      Rate and Rhythm: Normal rate and regular rhythm.      Heart sounds: Normal heart sounds.   Pulmonary:      Effort: Pulmonary effort is normal.      Breath sounds: Normal breath sounds.   Musculoskeletal:      Cervical back: Neck supple.      Right lower leg: Edema present.      Left lower leg: Edema present.      Comments: There is 3+ pitting edema on both legs with  reddish-purple chronic stasis dermatitis symmetrically scattered on both shins.  There is additionally a very warm erythematous area over the right medial malleolus without break in the skin.  There are several small skin tears scattered throughout both legs.  There is weeping of serous fluid on the legs.   Skin:     General: Skin is warm and dry.   Neurological:      Mental Status: He is alert.         Assessment & Plan  Community acquired pneumonia, unspecified laterality  Clinically he has improved.  Will finish out his 7-day course of Augmentin and monitor his blood labs while here.         Cellulitis of right ankle  Overall clinical picture is very concerning for cellulitis of the right medial ankle.  This is despite a current course of oral Augmentin.  I had a ye discussion with the patient and his family that this would indicate a likely more resistant infection and he may require hospitalization and consultation with infectious disease.  For now since we are considering palliative and possibly hospice care we will try aggressive treatment in this facility with the addition of oral Bactrim to cover MRSA bacteria.  If there is no improvement in the redness and swelling then we would either need to send him back to the hospital for intravenous medication and ID consult or consider moving forward with hospice care consult.       Chronic systolic heart failure  He continues to show significant fluid overload despite maximum treatment with beta-blocker, isosorbide, torsemide.  We had a ye discussion with the family that he has an extremely weak heart which cannot be corrected medically.  They are strongly considering pursuing palliative care options.  He is clinically recovered from the acute exacerbation brought on by the recent pneumonia.         Type 2 diabetes mellitus with hyperglycemia, without long-term current use of insulin  Lab Results   Component Value Date    HGBA1C 6.3 (H) 12/20/2024   A1c  indicates adequate control                  Please excuse any errors in grammar or translation related to this dictation. Voice recognition software was utilized to prepare this document.       Electronically Signed By: Flo Swenson MD   2/11/25  5:29 PM

## 2025-02-11 NOTE — ASSESSMENT & PLAN NOTE
Clinically he has improved.  Will finish out his 7-day course of Augmentin and monitor his blood labs while here.

## 2025-02-11 NOTE — PROGRESS NOTES
Admission H&P  Charu Russell is a 89 y.o. male who is being seen for an admission H&P at [facility].  Nursing notes, vital signs, and labs were reviewed in the local facility chart and he  is being evaluated for multiple medical problems. Orders and medications were reviewed on the local chart.  HPI   This 89-year-old male was hospitalized January 7 through February 7 for acute respiratory failure secondary to pneumonia.  His recovery was complicated by acute heart failure on chronic heart failure, hyponatremia, hypervolemia and severe edema throughout.  He was evaluated by cardiology but declined any further invasive evaluation.  He was treated with hypertonic saline and then tolva[tan with Lasix as needed.  He was recommended to finish a 7-day course of Augmentin for the pneumonia and wean off oxygen as tolerated.  Overall prognosis was reportedly discussed with the family and felt to be very poor.  Palliative care was consulted and there was a discussion of considering hospice care in the future.  His son and family are present at the time of this examination.    Nursing staff also notes that today there is a new red puffy area on the right ankle.  He is not complaining of pain in this area and there is no fever or chills  Objective   There were no vitals taken for this visit.  Physical Exam  Constitutional:       Appearance: Normal appearance.   HENT:      Head: Normocephalic.   Eyes:      Conjunctiva/sclera: Conjunctivae normal.   Cardiovascular:      Rate and Rhythm: Normal rate and regular rhythm.      Heart sounds: Normal heart sounds.   Pulmonary:      Effort: Pulmonary effort is normal.      Breath sounds: Normal breath sounds.   Musculoskeletal:      Cervical back: Neck supple.      Right lower leg: Edema present.      Left lower leg: Edema present.      Comments: There is 3+ pitting edema on both legs with reddish-purple chronic stasis dermatitis symmetrically scattered on both shins.   There is additionally a very warm erythematous area over the right medial malleolus without break in the skin.  There are several small skin tears scattered throughout both legs.  There is weeping of serous fluid on the legs.   Skin:     General: Skin is warm and dry.   Neurological:      Mental Status: He is alert.         Assessment & Plan  Community acquired pneumonia, unspecified laterality  Clinically he has improved.  Will finish out his 7-day course of Augmentin and monitor his blood labs while here.         Cellulitis of right ankle  Overall clinical picture is very concerning for cellulitis of the right medial ankle.  This is despite a current course of oral Augmentin.  I had a ye discussion with the patient and his family that this would indicate a likely more resistant infection and he may require hospitalization and consultation with infectious disease.  For now since we are considering palliative and possibly hospice care we will try aggressive treatment in this facility with the addition of oral Bactrim to cover MRSA bacteria.  If there is no improvement in the redness and swelling then we would either need to send him back to the hospital for intravenous medication and ID consult or consider moving forward with hospice care consult.       Chronic systolic heart failure  He continues to show significant fluid overload despite maximum treatment with beta-blocker, isosorbide, torsemide.  We had a ye discussion with the family that he has an extremely weak heart which cannot be corrected medically.  They are strongly considering pursuing palliative care options.  He is clinically recovered from the acute exacerbation brought on by the recent pneumonia.         Type 2 diabetes mellitus with hyperglycemia, without long-term current use of insulin  Lab Results   Component Value Date    HGBA1C 6.3 (H) 12/20/2024   A1c indicates adequate control           Anemia due to other cause, not classified  Anemia  was identified in the hospital with hemoglobin of 7.9.  Repeat hemoglobin is 7.7 yesterday although there is no sign of any bleeding.  This is relatively stable but iron studies are pending to see if iron deficiency is contributing to his anemia.                Please excuse any errors in grammar or translation related to this dictation. Voice recognition software was utilized to prepare this document.

## 2025-02-11 NOTE — ASSESSMENT & PLAN NOTE
He continues to show significant fluid overload despite maximum treatment with beta-blocker, isosorbide, torsemide.  We had a ye discussion with the family that he has an extremely weak heart which cannot be corrected medically.  They are strongly considering pursuing palliative care options.  He is clinically recovered from the acute exacerbation brought on by the recent pneumonia.

## 2025-02-11 NOTE — ASSESSMENT & PLAN NOTE
Lab Results   Component Value Date    HGBA1C 6.3 (H) 12/20/2024   A1c indicates adequate control

## 2025-02-16 NOTE — DOCUMENTATION CLARIFICATION NOTE
"    PATIENT:               MCKENZIE DE LOS SANTOS  ACCT #:                  [id number]  MRN:                       [id number]  :                       1930  ADMIT DATE:       2025 1:54 PM  DISCH DATE:        2025 2:30 PM  RESPONDING PROVIDER #:        [id number]          PROVIDER RESPONSE TEXT:    Gram Negative PNA    CDI QUERY TEXT:    Clarification      Instruction:    Based on your assessment of the patient and the clinical information, please provide the requested documentation by clicking on the appropriate radio button and enter any additional information if prompted.    Question: Please further specify the type of pneumonia being treated    When answering this query, please exercise your independent professional judgment. The fact that a question is being asked, does not imply that any particular answer is desired or expected.    The patient's clinical indicators include:  Clinical Information: 89 y/o with pneumonia    Clinical Indicators:   XR chest \" Bibasilar  airspace consolidations are seen and may represent small pleural  effusions, atelectasis and/or pneumonia.\"    25 CT Chest  \"1.  Bilateral dependent lower lobe atelectasis  2. 2.4 cm pleural-based ground-glass opacity right upper lobe is new.\"    2/3/25 PN SLP Stives  \"Dysphagia\" and a history of \"GERD\", and \"SLP Impressions with Severity Rating:  Pt presents with mild oropharyngeal dysphagia and mild to moderate esophageal phase dysphagia upon completion of modified barium swallow study this date. Swallowing physiology is detailed above. Impairments most impacting swallowing safety and efficiency include posterior escape of bolus, slow prolonged mastication, residual collection on oral and pharyngeal structures, and retention with retrograde flow of barium contrast in the esophagus.. Patient demonstrated no evidence of penetration or aspiration during the study. Patient demonstrated minimal to mild oral and pharyngeal residue that was " "reduced with second swallow.  Recommending a regular diet with thin liquids and use of slick diet strategy when needed.\"    2/7/25 DC summary Natasha \"CAP\"    Treatment:  -Levaquin  mg once  -Zithromax table 500 mg every 24 hours  -Zosyn IV 3.375mg once then every 8 hours  -Vancocin IV 1,500 mg once  -Augmentin 875-125mg tablet every 12 hours  -Rocephin IV 2g every 24 hours then 1g every 24 hours    Risk Factors: 89 y/o with a history of GERD and with pneumonia  Options provided:  -- Aspiration PNA  -- Gram Negative PNA  -- Other - I will add my own diagnosis  -- Refer to Clinical Documentation Reviewer    Query created by: Golden Lucia on 2/13/2025 8:22 AM      Electronically signed by:  BELGICA CACERES MD 2/16/2025 7:48 AM          "

## 2025-02-17 ENCOUNTER — NURSING HOME VISIT (OUTPATIENT)
Dept: POST ACUTE CARE | Facility: EXTERNAL LOCATION | Age: OVER 89
End: 2025-02-17
Payer: MEDICARE

## 2025-02-17 DIAGNOSIS — L03.115 CELLULITIS OF RIGHT ANKLE: Primary | ICD-10-CM

## 2025-02-17 DIAGNOSIS — J18.9 COMMUNITY ACQUIRED PNEUMONIA, UNSPECIFIED LATERALITY: ICD-10-CM

## 2025-02-17 DIAGNOSIS — D50.9 IRON DEFICIENCY ANEMIA, UNSPECIFIED IRON DEFICIENCY ANEMIA TYPE: ICD-10-CM

## 2025-02-17 PROCEDURE — 99309 SBSQ NF CARE MODERATE MDM 30: CPT

## 2025-02-17 NOTE — LETTER
Patient: Chris Russell  : 1930    Encounter Date: 2025    Visit  Note   Subjective  Chris Russell is a 89 y.o. male who is being seen at [facility] and evaluated for multiple medical problems. Nursing notes, vital signs, and labs were reviewed in the local facility chart. Orders and medications were reviewed on the local chart.  No chief complaint on file.     This is a 89 year old male evaluated today following hospitalization for aspiration pneumonia. He is reporting he is feeling better and he denies shortness of breath or chest pains. He does have some residual cough. No other concerns for medical team today.          Objective  There were no vitals taken for this visit.  Physical Exam  Vitals reviewed.   Constitutional:       Appearance: Normal appearance.   HENT:      Head: Normocephalic.   Cardiovascular:      Rate and Rhythm: Normal rate and regular rhythm.   Pulmonary:      Effort: Pulmonary effort is normal. No respiratory distress.      Breath sounds: Rhonchi present. No wheezing or rales.   Musculoskeletal:      Cervical back: Neck supple.      Right lower leg: Edema present.      Left lower leg: Edema present.   Skin:     General: Skin is warm and dry.   Neurological:      Mental Status: He is alert and oriented to person, place, and time.   Psychiatric:         Mood and Affect: Mood normal.         Behavior: Behavior normal.         Assessment & Plan  Cellulitis of right ankle  The swelling and redness in his legs have improved. We will continue with current treatments.          Community acquired pneumonia, unspecified laterality  He is recommended to continue bronchial hygiene here. Completed augmentin         Iron deficiency anemia, unspecified iron deficiency anemia type  Reviewed his iron panel and ordered iron supplementation daily with stool softener and miralax PRN. B12 levels were elevated discontinued the multivitamin.               Please excuse any errors in grammar or  translation related to this dictation. Voice recognition software was utilized to prepare this document.       Electronically Signed By: CHELLY Clinton   2/17/25  7:20 PM

## 2025-02-18 ENCOUNTER — APPOINTMENT (OUTPATIENT)
Dept: PRIMARY CARE | Facility: CLINIC | Age: OVER 89
End: 2025-02-18
Payer: MEDICARE

## 2025-02-18 NOTE — ASSESSMENT & PLAN NOTE
The swelling and redness in his legs have improved. We will continue with current treatments.

## 2025-02-18 NOTE — PROGRESS NOTES
Visit  Note   Subjective   Chris Russell is a 89 y.o. male who is being seen at [facility] and evaluated for multiple medical problems. Nursing notes, vital signs, and labs were reviewed in the local facility chart. Orders and medications were reviewed on the local chart.  No chief complaint on file.     This is a 89 year old male evaluated today following hospitalization for aspiration pneumonia. He is reporting he is feeling better and he denies shortness of breath or chest pains. He does have some residual cough. No other concerns for medical team today.          Objective   There were no vitals taken for this visit.  Physical Exam  Vitals reviewed.   Constitutional:       Appearance: Normal appearance.   HENT:      Head: Normocephalic.   Cardiovascular:      Rate and Rhythm: Normal rate and regular rhythm.   Pulmonary:      Effort: Pulmonary effort is normal. No respiratory distress.      Breath sounds: Rhonchi present. No wheezing or rales.   Musculoskeletal:      Cervical back: Neck supple.      Right lower leg: Edema present.      Left lower leg: Edema present.   Skin:     General: Skin is warm and dry.   Neurological:      Mental Status: He is alert and oriented to person, place, and time.   Psychiatric:         Mood and Affect: Mood normal.         Behavior: Behavior normal.         Assessment & Plan  Cellulitis of right ankle  The swelling and redness in his legs have improved. We will continue with current treatments.          Community acquired pneumonia, unspecified laterality  He is recommended to continue bronchial hygiene here. Completed augmentin         Iron deficiency anemia, unspecified iron deficiency anemia type  Reviewed his iron panel and ordered iron supplementation daily with stool softener and miralax PRN. B12 levels were elevated discontinued the multivitamin.               Please excuse any errors in grammar or translation related to this dictation. Voice recognition software was  utilized to prepare this document.

## 2025-02-18 NOTE — ASSESSMENT & PLAN NOTE
Reviewed his iron panel and ordered iron supplementation daily with stool softener and miralax PRN. B12 levels were elevated discontinued the multivitamin.

## 2025-02-23 LAB
ATRIAL RATE: 88 BPM
P AXIS: 44 DEGREES
P OFFSET: 187 MS
P ONSET: 125 MS
PR INTERVAL: 172 MS
Q ONSET: 211 MS
QRS COUNT: 13 BEATS
QRS DURATION: 86 MS
QT INTERVAL: 390 MS
QTC CALCULATION(BAZETT): 449 MS
QTC FREDERICIA: 429 MS
R AXIS: -62 DEGREES
T AXIS: 55 DEGREES
T OFFSET: 406 MS
VENTRICULAR RATE: 80 BPM

## 2025-02-25 ENCOUNTER — APPOINTMENT (OUTPATIENT)
Dept: CARDIOLOGY | Facility: CLINIC | Age: OVER 89
End: 2025-02-25
Payer: MEDICARE

## 2025-02-26 NOTE — PROGRESS NOTES
"Subjective   Patient ID: Chris Russell is a 89 y.o. male who presents for Hospital Follow-up.  HPI  Patient here for transitional care    Hospitalized in the nursing home for pneumonia    Possible bed sore     pneumonia symptoms improved  Less cough or wheeze or shortness of breath but still recuperating  Is finished antibiotics    Mood stable  Under stress as he has been ill and spouse passed away    High cholesterol stable watch diet follow blood work    A-fib and congestive heart failure stable keep cardiac follow-up    Patient here today with daughter    Hypertension stable no chest pain or shortness of breath    Review of Systems   Constitutional:  Negative for activity change and fatigue.   HENT:  Negative for congestion and sore throat.    Eyes:  Negative for discharge.   Respiratory:  Negative for cough, chest tightness and shortness of breath.    Cardiovascular:  Negative for chest pain and leg swelling.   Gastrointestinal:  Negative for abdominal pain, blood in stool, constipation, diarrhea, nausea and vomiting.   Endocrine: Negative for cold intolerance and heat intolerance.   Genitourinary:  Negative for difficulty urinating and hematuria.   Musculoskeletal:  Negative for arthralgias, back pain, gait problem, myalgias and neck pain.   Allergic/Immunologic: Negative for environmental allergies.   Neurological:  Negative for dizziness, syncope, weakness, numbness and headaches.   Hematological:  Negative for adenopathy. Does not bruise/bleed easily.   Psychiatric/Behavioral:  Negative for dysphoric mood. The patient is not nervous/anxious.    All other systems reviewed and are negative.      Objective   /62 (BP Location: Right arm, BP Cuff Size: Large adult)   Pulse 94   Ht 1.88 m (6' 2.02\")   Wt 94.3 kg (208 lb)   SpO2 95%   BMI 26.69 kg/m²    Physical Exam  Vitals and nursing note reviewed.   Constitutional:       General: He is not in acute distress.     Appearance: Normal appearance. "   HENT:      Head: Normocephalic and atraumatic.      Right Ear: Tympanic membrane, ear canal and external ear normal.      Left Ear: Tympanic membrane, ear canal and external ear normal.      Nose: Nose normal.      Mouth/Throat:      Mouth: Mucous membranes are moist.      Pharynx: Oropharynx is clear. No oropharyngeal exudate or posterior oropharyngeal erythema.   Eyes:      Extraocular Movements: Extraocular movements intact.      Conjunctiva/sclera: Conjunctivae normal.      Pupils: Pupils are equal, round, and reactive to light.   Cardiovascular:      Rate and Rhythm: Normal rate and regular rhythm.      Pulses: Normal pulses.      Heart sounds: Normal heart sounds. No murmur heard.  Pulmonary:      Effort: Pulmonary effort is normal. No respiratory distress.      Breath sounds: Normal breath sounds. No wheezing or rales.   Abdominal:      General: Abdomen is flat. Bowel sounds are normal. There is no distension.      Palpations: Abdomen is soft. There is no mass.      Tenderness: There is no abdominal tenderness.   Musculoskeletal:         General: No swelling or deformity. Normal range of motion.      Cervical back: Normal range of motion and neck supple.      Right lower leg: No edema.      Left lower leg: No edema.   Lymphadenopathy:      Cervical: No cervical adenopathy.   Skin:     General: Skin is warm and dry.      Capillary Refill: Capillary refill takes less than 2 seconds.      Findings: No lesion or rash.   Neurological:      General: No focal deficit present.      Mental Status: He is alert and oriented to person, place, and time.      Cranial Nerves: No cranial nerve deficit.      Motor: No weakness.   Psychiatric:         Mood and Affect: Mood normal.         Behavior: Behavior normal.         Thought Content: Thought content normal.         Judgment: Judgment normal.         Assessment/Plan   Problem List Items Addressed This Visit       Mixed hyperlipidemia - Primary    Benign prostatic  hyperplasia with urinary frequency    Controlled type 2 diabetes mellitus without complication, with long-term current use of insulin (Multi)    Obstructive sleep apnea syndrome    Community acquired pneumonia, unspecified laterality    Chronic systolic heart failure    Relevant Medications    metoprolol succinate XL (Toprol-XL) 25 mg 24 hr tablet    isosorbide mononitrate ER (Imdur) 60 mg 24 hr tablet     Other Visit Diagnoses       Acute cough        Relevant Medications    benzonatate (Tessalon) 200 mg capsule    Other Relevant Orders    Follow Up In [facility] - PCP    Pressure injury of skin of sacral region, unspecified injury stage        Relevant Medications    silver sulfADIAZINE (Silvadene) 1 % cream    Acute HFrEF (heart failure with reduced ejection fraction)        Relevant Medications    metoprolol succinate XL (Toprol-XL) 25 mg 24 hr tablet    torsemide (Demadex) 10 mg tablet    isosorbide mononitrate ER (Imdur) 60 mg 24 hr tablet    Primary hypertension        Relevant Medications    isosorbide mononitrate ER (Imdur) 60 mg 24 hr tablet            Patient education provided.  Stay current with age appropriate health maintenance as instructed.  Appointment here or ER with new or worsening symptoms'  Keep appropriate follow-up visit.  Stay current with proper immunizations     Refills as above  Discussed at length with patient and daughter  Recheck 1 month and as needed  Keep specialist follow-up  Discussed at length

## 2025-02-27 ENCOUNTER — OFFICE VISIT (OUTPATIENT)
Dept: PRIMARY CARE | Facility: CLINIC | Age: OVER 89
End: 2025-02-27
Payer: MEDICARE

## 2025-02-27 ENCOUNTER — PATIENT OUTREACH (OUTPATIENT)
Dept: PRIMARY CARE | Facility: CLINIC | Age: OVER 89
End: 2025-02-27

## 2025-02-27 VITALS
HEIGHT: 74 IN | WEIGHT: 208 LBS | DIASTOLIC BLOOD PRESSURE: 62 MMHG | OXYGEN SATURATION: 95 % | SYSTOLIC BLOOD PRESSURE: 100 MMHG | BODY MASS INDEX: 26.69 KG/M2 | HEART RATE: 94 BPM

## 2025-02-27 DIAGNOSIS — Z79.4 CONTROLLED TYPE 2 DIABETES MELLITUS WITHOUT COMPLICATION, WITH LONG-TERM CURRENT USE OF INSULIN (MULTI): ICD-10-CM

## 2025-02-27 DIAGNOSIS — I50.21 ACUTE HFREF (HEART FAILURE WITH REDUCED EJECTION FRACTION): ICD-10-CM

## 2025-02-27 DIAGNOSIS — I50.22 CHRONIC SYSTOLIC HEART FAILURE: ICD-10-CM

## 2025-02-27 DIAGNOSIS — R05.1 ACUTE COUGH: ICD-10-CM

## 2025-02-27 DIAGNOSIS — I10 PRIMARY HYPERTENSION: ICD-10-CM

## 2025-02-27 DIAGNOSIS — G47.33 OBSTRUCTIVE SLEEP APNEA SYNDROME: ICD-10-CM

## 2025-02-27 DIAGNOSIS — E78.2 MIXED HYPERLIPIDEMIA: Primary | ICD-10-CM

## 2025-02-27 DIAGNOSIS — N40.1 BENIGN PROSTATIC HYPERPLASIA WITH URINARY FREQUENCY: ICD-10-CM

## 2025-02-27 DIAGNOSIS — R35.0 BENIGN PROSTATIC HYPERPLASIA WITH URINARY FREQUENCY: ICD-10-CM

## 2025-02-27 DIAGNOSIS — J18.9 COMMUNITY ACQUIRED PNEUMONIA, UNSPECIFIED LATERALITY: ICD-10-CM

## 2025-02-27 DIAGNOSIS — E11.9 CONTROLLED TYPE 2 DIABETES MELLITUS WITHOUT COMPLICATION, WITH LONG-TERM CURRENT USE OF INSULIN (MULTI): ICD-10-CM

## 2025-02-27 DIAGNOSIS — L89.159 PRESSURE INJURY OF SKIN OF SACRAL REGION, UNSPECIFIED INJURY STAGE: ICD-10-CM

## 2025-02-27 PROCEDURE — 1159F MED LIST DOCD IN RCRD: CPT | Performed by: FAMILY MEDICINE

## 2025-02-27 PROCEDURE — 99495 TRANSJ CARE MGMT MOD F2F 14D: CPT | Performed by: FAMILY MEDICINE

## 2025-02-27 PROCEDURE — 1111F DSCHRG MED/CURRENT MED MERGE: CPT | Performed by: FAMILY MEDICINE

## 2025-02-27 PROCEDURE — 3078F DIAST BP <80 MM HG: CPT | Performed by: FAMILY MEDICINE

## 2025-02-27 PROCEDURE — 3074F SYST BP LT 130 MM HG: CPT | Performed by: FAMILY MEDICINE

## 2025-02-27 PROCEDURE — 1123F ACP DISCUSS/DSCN MKR DOCD: CPT | Performed by: FAMILY MEDICINE

## 2025-02-27 PROCEDURE — 1160F RVW MEDS BY RX/DR IN RCRD: CPT | Performed by: FAMILY MEDICINE

## 2025-02-27 RX ORDER — TORSEMIDE 10 MG/1
10 TABLET ORAL DAILY
Qty: 30 TABLET | Refills: 2 | Status: SHIPPED | OUTPATIENT
Start: 2025-02-27

## 2025-02-27 RX ORDER — BENZONATATE 200 MG/1
200 CAPSULE ORAL 3 TIMES DAILY PRN
Qty: 42 CAPSULE | Refills: 2 | Status: SHIPPED | OUTPATIENT
Start: 2025-02-27 | End: 2025-03-29

## 2025-02-27 RX ORDER — ISOSORBIDE MONONITRATE 60 MG/1
60 TABLET, EXTENDED RELEASE ORAL DAILY
Qty: 30 TABLET | Refills: 2 | Status: SHIPPED | OUTPATIENT
Start: 2025-02-27

## 2025-02-27 RX ORDER — METOPROLOL SUCCINATE 25 MG/1
25 TABLET, EXTENDED RELEASE ORAL DAILY
Qty: 30 TABLET | Refills: 3 | Status: SHIPPED | OUTPATIENT
Start: 2025-02-27 | End: 2025-06-27

## 2025-02-27 RX ORDER — SILVER SULFADIAZINE 10 G/1000G
CREAM TOPICAL
Qty: 400 G | Refills: 0 | Status: SHIPPED | OUTPATIENT
Start: 2025-02-27 | End: 2025-04-28

## 2025-02-27 ASSESSMENT — ENCOUNTER SYMPTOMS
DIZZINESS: 0
FATIGUE: 0
NECK PAIN: 0
HEMATURIA: 0
ABDOMINAL PAIN: 0
BLOOD IN STOOL: 0
ADENOPATHY: 0
SHORTNESS OF BREATH: 0
BRUISES/BLEEDS EASILY: 0
WEAKNESS: 0
VOMITING: 0
NUMBNESS: 0
DIARRHEA: 0
ACTIVITY CHANGE: 0
EYE DISCHARGE: 0
MYALGIAS: 0
NERVOUS/ANXIOUS: 0
CHEST TIGHTNESS: 0
HEADACHES: 0
NAUSEA: 0
ARTHRALGIAS: 0
BACK PAIN: 0
DYSPHORIC MOOD: 0
CONSTIPATION: 0
SORE THROAT: 0
DIFFICULTY URINATING: 0
COUGH: 0

## 2025-02-27 NOTE — PROGRESS NOTES
Discharge Facility: [facility] 1/27/25 - 2/7/25     [facility]  Discharge Diagnosis: Community acquired pneumonia, unspecified laterality   Admission Date: 2/7/25  Discharge Date: 2/25/25    PCP Appointment Date: 2/27/25  Specialist Appointment Date: TBD  Hospital Encounter and Summary Linked: Yes    ED to Hosp-Admission (Discharged) with Antoine Kaur MD; Gualberto THOMAS MD (01/27/2025)     TCM outreach deferred as this patient has a follow up scheduled with PCP within 2 business days of DC on 2/27/25

## 2025-03-05 ENCOUNTER — TELEPHONE (OUTPATIENT)
Dept: PRIMARY CARE | Facility: CLINIC | Age: OVER 89
End: 2025-03-05
Payer: MEDICARE

## 2025-03-05 NOTE — TELEPHONE ENCOUNTER
Rekha from [facility] called in to let Dr. Chambers know they started the patients home care pt today. She stated the frequency will be 1 week 1, 2 week 3 , 1 week 1  Please Advise

## 2025-03-06 ENCOUNTER — TELEPHONE (OUTPATIENT)
Dept: PRIMARY CARE | Facility: CLINIC | Age: OVER 89
End: 2025-03-06
Payer: MEDICARE

## 2025-03-06 NOTE — TELEPHONE ENCOUNTER
Katherin with [facility] homecare phoned to request verbal orders on skilled nursing and PT for the patient.  [facility] may be contacted at [phone].  Thank you.

## 2025-03-07 DIAGNOSIS — R35.0 BENIGN PROSTATIC HYPERPLASIA WITH URINARY FREQUENCY: ICD-10-CM

## 2025-03-07 DIAGNOSIS — N40.1 BENIGN PROSTATIC HYPERPLASIA WITH URINARY FREQUENCY: ICD-10-CM

## 2025-03-07 RX ORDER — TAMSULOSIN HYDROCHLORIDE 0.4 MG/1
0.4 CAPSULE ORAL DAILY
Qty: 90 CAPSULE | Refills: 1 | Status: SHIPPED | OUTPATIENT
Start: 2025-03-07

## 2025-03-07 NOTE — TELEPHONE ENCOUNTER
I phoned the number provided by Katherin to inform of the doctors okay.  Unfortunately there was no answer and strangely no voicemail.

## 2025-03-13 ENCOUNTER — PATIENT OUTREACH (OUTPATIENT)
Dept: PRIMARY CARE | Facility: CLINIC | Age: OVER 89
End: 2025-03-13
Payer: MEDICARE

## 2025-03-14 ENCOUNTER — TELEPHONE (OUTPATIENT)
Dept: PRIMARY CARE | Facility: CLINIC | Age: OVER 89
End: 2025-03-14

## 2025-03-14 DIAGNOSIS — N40.1 BENIGN PROSTATIC HYPERPLASIA WITH URINARY FREQUENCY: ICD-10-CM

## 2025-03-14 DIAGNOSIS — R35.0 BENIGN PROSTATIC HYPERPLASIA WITH URINARY FREQUENCY: ICD-10-CM

## 2025-03-14 DIAGNOSIS — E78.2 MIXED HYPERLIPIDEMIA: ICD-10-CM

## 2025-03-14 NOTE — TELEPHONE ENCOUNTER
Dereck from [facility] called in to let Dr. Chambers know the patient canceled his appointment for OT this week. He stated he only saw the patient 1 out of the 2 scheduled days  Please Advise

## 2025-03-17 RX ORDER — TAMSULOSIN HYDROCHLORIDE 0.4 MG/1
0.8 CAPSULE ORAL DAILY
Qty: 180 CAPSULE | Refills: 1 | OUTPATIENT
Start: 2025-03-17

## 2025-03-17 RX ORDER — ATORVASTATIN CALCIUM 40 MG/1
40 TABLET, FILM COATED ORAL DAILY
Qty: 90 TABLET | Refills: 3 | Status: SHIPPED | OUTPATIENT
Start: 2025-03-17

## 2025-03-17 NOTE — TELEPHONE ENCOUNTER
Rx Refill Request     Name: Chris Russell  :  1930   Medication Name:  atorvastatin (Lipitor) 40 mg tablet   Specific Pharmacy location:  [facility]/pharmacy #[id number] - [place]   Date of last appointment:  2025   Date of next appointment:  Visit date not found   Best number to reach patient:  [phone]

## 2025-03-18 ENCOUNTER — TELEPHONE (OUTPATIENT)
Dept: PRIMARY CARE | Facility: CLINIC | Age: OVER 89
End: 2025-03-18
Payer: MEDICARE

## 2025-03-18 NOTE — TELEPHONE ENCOUNTER
Dulce from [facility] called in stating the she saw the patient today. She stated his lungs sound horrible, the lower part of his lungs sound diminished, he has a bad cough, green/yellow sinus drainage, and has an oxygen level of 94% while on 2 liters. She is extremely concerned as he is really struggling. Dulce noted he finished an antibiotic last month. She is requesting another round of antibiotics be sent in ASAP  [facility]  Please Advise

## 2025-03-18 NOTE — TELEPHONE ENCOUNTER
I spoke to his home healthcare nurse after being paged.  She was wondering about the status of a call placed earlier today regarding some chest congestion and potentially prescribing a another round of antibiotics.    Reviewed previous notes and Dr. Chambers had recommended evaluation at the ER.  This message was apparently passed on to the patient's daughter but home health care was not aware.    From health care will continue as directed.

## 2025-03-21 DIAGNOSIS — I50.21 ACUTE HFREF (HEART FAILURE WITH REDUCED EJECTION FRACTION): Primary | ICD-10-CM

## 2025-03-21 RX ORDER — TORSEMIDE 10 MG/1
10 TABLET ORAL DAILY
Qty: 90 TABLET | Refills: 1 | Status: SHIPPED | OUTPATIENT
Start: 2025-03-21 | End: 2025-09-17

## 2025-03-21 NOTE — TELEPHONE ENCOUNTER
90 DAY SUPPLY Rx Refill Request     Name: Chirs Russell  :  1930   Medication Name:     torsemide (Demadex) 10 mg tablet    Last fill: 2025 30 day supply Q 30 R 2  Specific Pharmacy location:  [facility]  Date of last appointment:  2025   Date of next appointment:  Visit date not found   Best number to reach patient:  [phone]       RX PENDED to [facility] as directed by Electronic Correspondence.

## 2025-04-02 ENCOUNTER — TELEPHONE (OUTPATIENT)
Dept: PRIMARY CARE | Facility: CLINIC | Age: OVER 89
End: 2025-04-02
Payer: MEDICARE

## 2025-04-02 DIAGNOSIS — F51.01 PRIMARY INSOMNIA: ICD-10-CM

## 2025-04-02 NOTE — TELEPHONE ENCOUNTER
Daughter calling he need new order to be sent to oxygen company for back up tank for house incase they lost power and small portable tank for appointments, when they came out and originally delivered everything, tony told them he didn't need the back up or portable, and the company took those back. Daughter also increased oxygen to 2.5, doing well on that setting. New referral needs to go to Karen, his current supplier. Please advise?

## 2025-04-03 RX ORDER — TRAZODONE HYDROCHLORIDE 50 MG/1
50 TABLET ORAL NIGHTLY
Qty: 90 TABLET | Refills: 0 | OUTPATIENT
Start: 2025-04-03 | End: 2026-04-03

## 2025-04-03 NOTE — TELEPHONE ENCOUNTER
Rx Refill Request Telephone Encounter    Name:  Chris Russell  :  [id number]    Specific Pharmacy location:  [facility] pharmacy in [place]   Date of last appointment:  25  Date of next appointment:  N/A             Rx already sent to pharmacy on 2/3/25 with 5 refills     Do not fill

## 2025-04-09 ENCOUNTER — PATIENT OUTREACH (OUTPATIENT)
Dept: PRIMARY CARE | Facility: CLINIC | Age: OVER 89
End: 2025-04-09
Payer: MEDICARE

## 2025-04-17 DIAGNOSIS — I10 PRIMARY HYPERTENSION: ICD-10-CM

## 2025-04-17 RX ORDER — ISOSORBIDE MONONITRATE 60 MG/1
60 TABLET, EXTENDED RELEASE ORAL DAILY
Qty: 90 TABLET | Refills: 1 | Status: SHIPPED | OUTPATIENT
Start: 2025-04-17

## 2025-06-01 DIAGNOSIS — I26.99 ACUTE PULMONARY EMBOLISM WITHOUT ACUTE COR PULMONALE, UNSPECIFIED PULMONARY EMBOLISM TYPE (MULTI): ICD-10-CM

## 2025-06-02 RX ORDER — APIXABAN 5 MG/1
5 TABLET, FILM COATED ORAL 2 TIMES DAILY
Qty: 60 TABLET | Refills: 3 | Status: SHIPPED | OUTPATIENT
Start: 2025-06-02

## 2025-06-02 NOTE — TELEPHONE ENCOUNTER
Rx Refill Request     Name: Chris Russell  :  1930   Date of last appointment:  2025   Date of next appointment:  Visit date not found   Best number to reach patient:  [phone]

## 2025-06-03 ENCOUNTER — TELEPHONE (OUTPATIENT)
Dept: PRIMARY CARE | Facility: CLINIC | Age: OVER 89
End: 2025-06-03
Payer: MEDICARE

## 2025-06-03 NOTE — TELEPHONE ENCOUNTER
Daughter jess had [place] paperwork faxed over wants to make sure we received it and gets faxed back to employer

## 2025-06-03 NOTE — LETTER
Dayana 3, 2025     Patient: Chris Russell   YOB: 1930   Date of Visit: 6/3/2025       To Whom It May Concern:    It is my medical opinion that Mr. Russell requires a disability parking placard for the following reasons:  He cannot walk 200 feet without stopping to rest.  Duration of need: 5 years    Expires 6/3/2030    If you have any questions or concerns, please don't hesitate to call.         Sincerely,        Pierce Chambers MD

## 2025-06-20 DIAGNOSIS — I50.21 ACUTE HFREF (HEART FAILURE WITH REDUCED EJECTION FRACTION): ICD-10-CM

## 2025-06-20 RX ORDER — METOPROLOL SUCCINATE 25 MG/1
25 TABLET, EXTENDED RELEASE ORAL DAILY
Qty: 90 TABLET | Refills: 1 | Status: SHIPPED | OUTPATIENT
Start: 2025-06-20

## 2025-06-20 NOTE — TELEPHONE ENCOUNTER
Rx Refill Request     Name: Chris Russell  :  1930   Date of last appointment:  2025   Date of next appointment:  Visit date not found   Best number to reach patient:  [phone]

## 2025-07-31 DIAGNOSIS — F51.01 PRIMARY INSOMNIA: ICD-10-CM

## 2025-07-31 RX ORDER — TRAZODONE HYDROCHLORIDE 100 MG/1
100 TABLET ORAL NIGHTLY PRN
Qty: 90 TABLET | Refills: 1 | Status: SHIPPED | OUTPATIENT
Start: 2025-07-31 | End: 2026-07-31

## 2025-07-31 NOTE — TELEPHONE ENCOUNTER
Rx Refill Request     Name: Chris Russell  :  1930   Date of last appointment:  2025   Date of next appointment:  Visit date not found   Best number to reach patient:  [phone]

## 2025-08-24 ENCOUNTER — APPOINTMENT (OUTPATIENT)
Dept: RADIOLOGY | Facility: HOSPITAL | Age: OVER 89
End: 2025-08-24
Payer: MEDICARE

## 2025-08-24 ENCOUNTER — HOSPITAL ENCOUNTER (EMERGENCY)
Facility: HOSPITAL | Age: OVER 89
Discharge: HOME | End: 2025-08-24
Attending: STUDENT IN AN ORGANIZED HEALTH CARE EDUCATION/TRAINING PROGRAM
Payer: MEDICARE

## 2025-08-24 VITALS
SYSTOLIC BLOOD PRESSURE: 143 MMHG | WEIGHT: 200 LBS | DIASTOLIC BLOOD PRESSURE: 73 MMHG | BODY MASS INDEX: 25.67 KG/M2 | TEMPERATURE: 96.8 F | HEIGHT: 74 IN | HEART RATE: 86 BPM | RESPIRATION RATE: 16 BRPM | OXYGEN SATURATION: 98 %
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
## 2025-08-24 DIAGNOSIS — S41.111A SKIN TEAR OF RIGHT UPPER ARM WITHOUT COMPLICATION, INITIAL ENCOUNTER: ICD-10-CM

## 2025-08-24 DIAGNOSIS — S51.811A SKIN TEAR OF RIGHT FOREARM WITHOUT COMPLICATION, INITIAL ENCOUNTER: ICD-10-CM

## 2025-08-24 DIAGNOSIS — S09.90XA CLOSED HEAD INJURY, INITIAL ENCOUNTER: Primary | ICD-10-CM

## 2025-08-24 LAB
ABO GROUP (TYPE) IN BLOOD: NORMAL
ALBUMIN SERPL BCP-MCNC: 2.9 G/DL (ref 3.4–5)
ALP SERPL-CCNC: 215 U/L (ref 33–136)
ALT SERPL W P-5'-P-CCNC: 63 U/L (ref 10–52)
ANION GAP SERPL CALC-SCNC: 12 MMOL/L (ref 10–20)
ANTIBODY SCREEN: NORMAL
AST SERPL W P-5'-P-CCNC: 42 U/L (ref 9–39)
BASOPHILS # BLD AUTO: 0.02 X10*3/UL (ref 0–0.1)
BASOPHILS NFR BLD AUTO: 0.2 %
BILIRUB SERPL-MCNC: 0.6 MG/DL (ref 0–1.2)
BUN SERPL-MCNC: 22 MG/DL (ref 6–23)
CALCIUM SERPL-MCNC: 8.3 MG/DL (ref 8.6–10.3)
CHLORIDE SERPL-SCNC: 104 MMOL/L (ref 98–107)
CO2 SERPL-SCNC: 24 MMOL/L (ref 21–32)
CREAT SERPL-MCNC: 1.58 MG/DL (ref 0.5–1.3)
EGFRCR SERPLBLD CKD-EPI 2021: 40 ML/MIN/1.73M*2
EOSINOPHIL # BLD AUTO: 0.2 X10*3/UL (ref 0–0.4)
EOSINOPHIL NFR BLD AUTO: 2.3 %
ERYTHROCYTE [DISTWIDTH] IN BLOOD BY AUTOMATED COUNT: 20.1 % (ref 11.5–14.5)
GIANT PLATELETS BLD QL SMEAR: NORMAL
GLUCOSE SERPL-MCNC: 136 MG/DL (ref 74–99)
HCT VFR BLD AUTO: 31.5 % (ref 41–52)
HGB BLD-MCNC: 9.9 G/DL (ref 13.5–17.5)
IMM GRANULOCYTES # BLD AUTO: 0.12 X10*3/UL (ref 0–0.5)
IMM GRANULOCYTES NFR BLD AUTO: 1.4 % (ref 0–0.9)
INR PPP: 1.2 (ref 0.9–1.1)
LYMPHOCYTES # BLD AUTO: 2.48 X10*3/UL (ref 0.8–3)
LYMPHOCYTES NFR BLD AUTO: 29 %
MCH RBC QN AUTO: 27.7 PG (ref 26–34)
MCHC RBC AUTO-ENTMCNC: 31.4 G/DL (ref 32–36)
MCV RBC AUTO: 88 FL (ref 80–100)
MONOCYTES # BLD AUTO: 0.68 X10*3/UL (ref 0.05–0.8)
MONOCYTES NFR BLD AUTO: 8 %
NEUTROPHILS # BLD AUTO: 5.04 X10*3/UL (ref 1.6–5.5)
NEUTROPHILS NFR BLD AUTO: 59.1 %
NRBC BLD-RTO: 0 /100 WBCS (ref 0–0)
OVALOCYTES BLD QL SMEAR: NORMAL
PLATELET # BLD AUTO: 517 X10*3/UL (ref 150–450)
PLATELET CLUMP BLD QL SMEAR: PRESENT
POLYCHROMASIA BLD QL SMEAR: NORMAL
POTASSIUM SERPL-SCNC: 3.8 MMOL/L (ref 3.5–5.3)
PROT SERPL-MCNC: 6.5 G/DL (ref 6.4–8.2)
PROTHROMBIN TIME: 13.2 SECONDS (ref 9.8–12.4)
RBC # BLD AUTO: 3.58 X10*6/UL (ref 4.5–5.9)
RBC MORPH BLD: NORMAL
RH FACTOR (ANTIGEN D): NORMAL
SCHISTOCYTES BLD QL SMEAR: NORMAL
SODIUM SERPL-SCNC: 136 MMOL/L (ref 136–145)
TARGETS BLD QL SMEAR: NORMAL
WBC # BLD AUTO: 8.5 X10*3/UL (ref 4.4–11.3)

## 2025-08-24 PROCEDURE — 85610 PROTHROMBIN TIME: CPT | Performed by: STUDENT IN AN ORGANIZED HEALTH CARE EDUCATION/TRAINING PROGRAM

## 2025-08-24 PROCEDURE — 70450 CT HEAD/BRAIN W/O DYE: CPT | Performed by: RADIOLOGY

## 2025-08-24 PROCEDURE — 2500000005 HC RX 250 GENERAL PHARMACY W/O HCPCS: Performed by: STUDENT IN AN ORGANIZED HEALTH CARE EDUCATION/TRAINING PROGRAM

## 2025-08-24 PROCEDURE — 84075 ASSAY ALKALINE PHOSPHATASE: CPT | Performed by: STUDENT IN AN ORGANIZED HEALTH CARE EDUCATION/TRAINING PROGRAM

## 2025-08-24 PROCEDURE — 85025 COMPLETE CBC W/AUTO DIFF WBC: CPT | Performed by: STUDENT IN AN ORGANIZED HEALTH CARE EDUCATION/TRAINING PROGRAM

## 2025-08-24 PROCEDURE — 71045 X-RAY EXAM CHEST 1 VIEW: CPT | Mod: FOREIGN READ | Performed by: RADIOLOGY

## 2025-08-24 PROCEDURE — 72125 CT NECK SPINE W/O DYE: CPT

## 2025-08-24 PROCEDURE — G0390 TRAUMA RESPONS W/HOSP CRITI: HCPCS

## 2025-08-24 PROCEDURE — 70450 CT HEAD/BRAIN W/O DYE: CPT

## 2025-08-24 PROCEDURE — 36415 COLL VENOUS BLD VENIPUNCTURE: CPT | Performed by: STUDENT IN AN ORGANIZED HEALTH CARE EDUCATION/TRAINING PROGRAM

## 2025-08-24 PROCEDURE — 72125 CT NECK SPINE W/O DYE: CPT | Performed by: RADIOLOGY

## 2025-08-24 PROCEDURE — 71045 X-RAY EXAM CHEST 1 VIEW: CPT

## 2025-08-24 PROCEDURE — 99285 EMERGENCY DEPT VISIT HI MDM: CPT | Mod: 25 | Performed by: STUDENT IN AN ORGANIZED HEALTH CARE EDUCATION/TRAINING PROGRAM

## 2025-08-24 PROCEDURE — 86901 BLOOD TYPING SEROLOGIC RH(D): CPT | Performed by: STUDENT IN AN ORGANIZED HEALTH CARE EDUCATION/TRAINING PROGRAM

## 2025-08-24 RX ORDER — BACITRACIN 500 [USP'U]/G
OINTMENT TOPICAL ONCE
Status: COMPLETED | OUTPATIENT
Start: 2025-08-24 | End: 2025-08-24

## 2025-08-24 RX ADMIN — BACITRACIN: 500 OINTMENT TOPICAL at 05:07

## 2025-08-24 ASSESSMENT — LIFESTYLE VARIABLES
HAVE YOU EVER FELT YOU SHOULD CUT DOWN ON YOUR DRINKING: NO
TOTAL SCORE: 0
EVER FELT BAD OR GUILTY ABOUT YOUR DRINKING: NO
HAVE PEOPLE ANNOYED YOU BY CRITICIZING YOUR DRINKING: NO
EVER HAD A DRINK FIRST THING IN THE MORNING TO STEADY YOUR NERVES TO GET RID OF A HANGOVER: NO

## 2025-08-24 ASSESSMENT — PAIN - FUNCTIONAL ASSESSMENT
PAIN_FUNCTIONAL_ASSESSMENT: 0-10
PAIN_FUNCTIONAL_ASSESSMENT: 0-10

## 2025-08-24 ASSESSMENT — PAIN SCALES - GENERAL
PAINLEVEL_OUTOF10: 0 - NO PAIN
PAINLEVEL_OUTOF10: 10 - WORST POSSIBLE PAIN
PAINLEVEL_OUTOF10: 0 - NO PAIN
PAINLEVEL_OUTOF10: 8